# Patient Record
Sex: FEMALE | Race: BLACK OR AFRICAN AMERICAN | NOT HISPANIC OR LATINO | Employment: FULL TIME | URBAN - METROPOLITAN AREA
[De-identification: names, ages, dates, MRNs, and addresses within clinical notes are randomized per-mention and may not be internally consistent; named-entity substitution may affect disease eponyms.]

---
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
## 2020-02-05 ENCOUNTER — TELEPHONE (OUTPATIENT)
Dept: OBSTETRICS AND GYNECOLOGY | Facility: CLINIC | Age: 20
End: 2020-02-05

## 2020-02-11 ENCOUNTER — OFFICE VISIT (OUTPATIENT)
Dept: OBSTETRICS AND GYNECOLOGY | Facility: CLINIC | Age: 20
End: 2020-02-11
Payer: MEDICAID

## 2020-02-11 ENCOUNTER — PATIENT MESSAGE (OUTPATIENT)
Dept: OBSTETRICS AND GYNECOLOGY | Facility: CLINIC | Age: 20
End: 2020-02-11

## 2020-02-11 VITALS
SYSTOLIC BLOOD PRESSURE: 120 MMHG | DIASTOLIC BLOOD PRESSURE: 78 MMHG | BODY MASS INDEX: 20.71 KG/M2 | WEIGHT: 106.06 LBS

## 2020-02-11 DIAGNOSIS — N92.1 MENORRHAGIA WITH IRREGULAR CYCLE: Primary | ICD-10-CM

## 2020-02-11 LAB
B-HCG UR QL: NEGATIVE
CTP QC/QA: YES

## 2020-02-11 PROCEDURE — 99212 OFFICE O/P EST SF 10 MIN: CPT | Mod: PBBFAC,PO | Performed by: OBSTETRICS & GYNECOLOGY

## 2020-02-11 PROCEDURE — 99999 PR PBB SHADOW E&M-EST. PATIENT-LVL II: ICD-10-PCS | Mod: PBBFAC,,, | Performed by: OBSTETRICS & GYNECOLOGY

## 2020-02-11 PROCEDURE — 99213 PR OFFICE/OUTPT VISIT, EST, LEVL III, 20-29 MIN: ICD-10-PCS | Mod: S$PBB,,, | Performed by: OBSTETRICS & GYNECOLOGY

## 2020-02-11 PROCEDURE — 99213 OFFICE O/P EST LOW 20 MIN: CPT | Mod: S$PBB,,, | Performed by: OBSTETRICS & GYNECOLOGY

## 2020-02-11 PROCEDURE — 81025 URINE PREGNANCY TEST: CPT | Mod: PBBFAC,PO | Performed by: OBSTETRICS & GYNECOLOGY

## 2020-02-11 PROCEDURE — 99999 PR PBB SHADOW E&M-EST. PATIENT-LVL II: CPT | Mod: PBBFAC,,, | Performed by: OBSTETRICS & GYNECOLOGY

## 2020-02-11 RX ORDER — NORGESTIMATE AND ETHINYL ESTRADIOL 7DAYSX3 LO
1 KIT ORAL DAILY
Qty: 90 TABLET | Refills: 3 | Status: SHIPPED | OUTPATIENT
Start: 2020-02-11 | End: 2020-11-16

## 2020-02-11 NOTE — PROGRESS NOTES
History & Physical  Gynecology      SUBJECTIVE:     Chief Complaint: Metrorrhagia       History of Present Illness:  Ms. Ang, 18 yo , presents today for follow up on irregular periods and birth control. Previously counseled on all methods of birth control and desired to have a Nexplanon placed. Patient was contacted about Nexplanon placement and patient declined at that time. Today she describes her periods as still heavier and lasting longer than previously. She no longer wants Nexplanon as she said she heard scary stories about them and would just like to try pills.       Review of patient's allergies indicates:  No Known Allergies    Past Medical History:   Diagnosis Date    Migraine headache     Scoliosis      Past Surgical History:   Procedure Laterality Date    BACK SURGERY  2018     OB History        0    Para   0    Term   0       0    AB   0    Living   0       SAB   0    TAB   0    Ectopic   0    Multiple   0    Live Births   0               Family History   Problem Relation Age of Onset    Hypertension Mother     Stroke Mother     Aneurysm Mother     Breast cancer Neg Hx     Colon cancer Neg Hx     Ovarian cancer Neg Hx      Social History     Tobacco Use    Smoking status: Never Smoker    Smokeless tobacco: Never Used   Substance Use Topics    Alcohol use: No     Frequency: Never    Drug use: No       Current Outpatient Medications   Medication Sig    ibuprofen (ADVIL,MOTRIN) 600 MG tablet Take 1 tablet (600 mg total) by mouth every 6 (six) hours as needed.    fluticasone (FLONASE) 50 mcg/actuation nasal spray 1 spray (50 mcg total) by Each Nare route 2 (two) times daily as needed. (Patient not taking: Reported on 2020)    norgestimate-ethinyl estradiol (ORTHO TRI-CYCLEN LO) 0.18/0.215/0.25 mg-25 mcg tablet Take 1 tablet by mouth once daily.    sumatriptan (IMITREX) 50 MG tablet TK 1 T PO AOS OF MIGRAINE. MAY REPEAT AFTER 2 H IF HA RETURNS.  MG IN 24  H     No current facility-administered medications for this visit.          Review of Systems:  Review of Systems   Constitutional: Negative for fever.   Gastrointestinal: Negative for abdominal pain.   Endocrine: Negative for diabetes.   Genitourinary: Positive for vaginal bleeding (irregular and heavy). Negative for dyspareunia and pelvic pain.   Musculoskeletal: Negative for back pain.   Integumentary:  Negative for acne, breast mass, nipple discharge and breast tenderness.   Neurological: Negative for syncope.   Breast: Negative for lump, mass, nipple discharge and tenderness       OBJECTIVE:     Physical Exam:  Physical Exam   Constitutional: She appears well-developed and well-nourished.   HENT:   Head: Normocephalic and atraumatic.   Eyes: EOM are normal.   Neck: Normal range of motion. Neck supple.   Cardiovascular: Normal rate.   Pulmonary/Chest: Effort normal.   Abdominal: Soft.   Genitourinary: Vagina normal.   Musculoskeletal: Normal range of motion.   Neurological: She is alert.   Skin: Skin is warm.   Psychiatric: She has a normal mood and affect.         ASSESSMENT:       ICD-10-CM ICD-9-CM    1. Menorrhagia with irregular cycle N92.1 626.2 POCT Urine Pregnancy      norgestimate-ethinyl estradiol (ORTHO TRI-CYCLEN LO) 0.18/0.215/0.25 mg-25 mcg tablet          Plan:      1. Irregular cycle  - Negative pregnancy test  - OCPs ordered to patient pharmacy  - Patient will contact through portal if no change in cycles over next 2 months or desire for other contraception    Laura Chavez M.D.  [facility] PGY1

## 2020-03-11 ENCOUNTER — PATIENT MESSAGE (OUTPATIENT)
Dept: OBSTETRICS AND GYNECOLOGY | Facility: CLINIC | Age: 20
End: 2020-03-11

## 2020-03-12 ENCOUNTER — PATIENT MESSAGE (OUTPATIENT)
Dept: OBSTETRICS AND GYNECOLOGY | Facility: CLINIC | Age: 20
End: 2020-03-12

## 2020-03-25 ENCOUNTER — HOSPITAL ENCOUNTER (EMERGENCY)
Facility: HOSPITAL | Age: 20
Discharge: HOME | End: 2020-03-25
Attending: EMERGENCY MEDICINE
Payer: MEDICAID

## 2020-03-25 VITALS
OXYGEN SATURATION: 98 % | TEMPERATURE: 98 F | WEIGHT: 107 LBS | SYSTOLIC BLOOD PRESSURE: 126 MMHG | HEART RATE: 88 BPM | BODY MASS INDEX: 21.01 KG/M2 | DIASTOLIC BLOOD PRESSURE: 76 MMHG | HEIGHT: 60 IN | RESPIRATION RATE: 20 BRPM

## 2020-03-25 DIAGNOSIS — R07.9 CHEST PAIN: ICD-10-CM

## 2020-03-25 DIAGNOSIS — R07.9 CHEST PAIN, UNSPECIFIED TYPE: Primary | ICD-10-CM

## 2020-03-25 LAB
B-HCG UR QL: NEGATIVE
BACTERIA #/AREA URNS HPF: NORMAL /HPF
BILIRUB UR QL STRIP: NEGATIVE
CLARITY UR: ABNORMAL
COLOR UR: YELLOW
CTP QC/QA: YES
GLUCOSE UR QL STRIP: NEGATIVE
HGB UR QL STRIP: ABNORMAL
INFLUENZA A, MOLECULAR: NEGATIVE
INFLUENZA B, MOLECULAR: NEGATIVE
KETONES UR QL STRIP: NEGATIVE
LEUKOCYTE ESTERASE UR QL STRIP: NEGATIVE
MICROSCOPIC COMMENT: NORMAL
NITRITE UR QL STRIP: NEGATIVE
PH UR STRIP: 6 [PH] (ref 5–8)
PROT UR QL STRIP: NEGATIVE
RBC #/AREA URNS HPF: 1 /HPF (ref 0–4)
SP GR UR STRIP: 1.02 (ref 1–1.03)
SPECIMEN SOURCE: NORMAL
SQUAMOUS #/AREA URNS HPF: 5 /HPF
URN SPEC COLLECT METH UR: ABNORMAL
UROBILINOGEN UR STRIP-ACNC: 1 EU/DL
WBC #/AREA URNS HPF: 3 /HPF (ref 0–5)

## 2020-03-25 PROCEDURE — 81025 URINE PREGNANCY TEST: CPT | Performed by: EMERGENCY MEDICINE

## 2020-03-25 PROCEDURE — 99284 EMERGENCY DEPT VISIT MOD MDM: CPT | Mod: 25

## 2020-03-25 PROCEDURE — 87502 INFLUENZA DNA AMP PROBE: CPT

## 2020-03-25 PROCEDURE — 93005 ELECTROCARDIOGRAM TRACING: CPT

## 2020-03-25 PROCEDURE — 81000 URINALYSIS NONAUTO W/SCOPE: CPT

## 2020-03-25 PROCEDURE — 25000003 PHARM REV CODE 250: Performed by: EMERGENCY MEDICINE

## 2020-03-25 RX ORDER — ACETAMINOPHEN 325 MG/1
650 TABLET ORAL
Status: COMPLETED | OUTPATIENT
Start: 2020-03-25 | End: 2020-03-25

## 2020-03-25 RX ORDER — KETOROLAC TROMETHAMINE 30 MG/ML
30 INJECTION, SOLUTION INTRAMUSCULAR; INTRAVENOUS
Status: DISCONTINUED | OUTPATIENT
Start: 2020-03-25 | End: 2020-03-25

## 2020-03-25 RX ADMIN — ACETAMINOPHEN 650 MG: 325 TABLET ORAL at 06:03

## 2020-03-25 NOTE — DISCHARGE INSTRUCTIONS
I recommend taking ibuprofen 600mg every 8 hours with food and/or tylenol 650mg every 8 hours with food as needed for pain.

## 2020-03-25 NOTE — ED PROVIDER NOTES
"Encounter Date: 3/25/2020       History     Chief Complaint   Patient presents with    Chest Pain     19y F ambulatory to ED with c/o chest pain and congestion x1 week that "wont go away."     19-year-old female presents to the emergency department complaining of chest pain.  Describes a diffuse aching chest pain for a week.  Pain has been constant.  States she is now having some right upper extremity pain as well, prompting her to come to the department.  Has not taken any medication for the pain.  Denies any shortness of breath.  States pain has been constant without exacerbating or alleviating factors.  Notes some congestion but denies any cough, fever, sore throat.  No other symptoms reported at this time. Of note, patient had a telemedicine visit yesterday for headaches and did not mention her chest pain.         Review of patient's allergies indicates:  No Known Allergies  Past Medical History:   Diagnosis Date    Migraine headache     Scoliosis      Past Surgical History:   Procedure Laterality Date    BACK SURGERY  02/20/2018     Family History   Problem Relation Age of Onset    Hypertension Mother     Stroke Mother     Aneurysm Mother     Breast cancer Neg Hx     Colon cancer Neg Hx     Ovarian cancer Neg Hx      Social History     Tobacco Use    Smoking status: Never Smoker    Smokeless tobacco: Never Used   Substance Use Topics    Alcohol use: No     Frequency: Never    Drug use: No     Review of Systems   Constitutional: Negative for chills and fever.   HENT: Positive for congestion. Negative for sore throat and voice change.    Respiratory: Negative for cough, choking and shortness of breath.    Cardiovascular: Positive for chest pain. Negative for palpitations and leg swelling.   Gastrointestinal: Negative for abdominal pain, diarrhea and vomiting.   Musculoskeletal: Negative for back pain.   Neurological: Negative for light-headedness, numbness and headaches.       Physical Exam "     Initial Vitals [03/25/20 0608]   BP Pulse Resp Temp SpO2   (!) 143/85 78 16 98.2 °F (36.8 °C) 99 %      MAP       --         Physical Exam    Nursing note and vitals reviewed.  Constitutional: She appears well-developed and well-nourished. No distress.   HENT:   Head: Normocephalic and atraumatic.   Eyes: Conjunctivae and EOM are normal. Pupils are equal, round, and reactive to light.   Neck: Normal range of motion. Neck supple. No tracheal deviation present.   Cardiovascular: Normal rate and intact distal pulses.   Pulmonary/Chest: No respiratory distress.   Musculoskeletal: Normal range of motion. She exhibits no edema or tenderness.   Neurological: She is alert and oriented to person, place, and time. She has normal strength. No cranial nerve deficit.   Skin: Skin is warm and dry.         ED Course   Procedures  Labs Reviewed   URINALYSIS - Abnormal; Notable for the following components:       Result Value    Appearance, UA Hazy (*)     Occult Blood UA 3+ (*)     All other components within normal limits   POCT URINE PREGNANCY - Normal   INFLUENZA A & B BY MOLECULAR   URINALYSIS MICROSCOPIC     EKG Readings: (Independently Interpreted)   Initial Reading: No STEMI. Previous EKG Date: 6/3/19 (Minimal change) . Rhythm: Normal Sinus Rhythm. Heart Rate: 70. Ectopy: No Ectopy. Conduction: Normal. ST Segments: Normal ST Segments. T Waves: Normal. Axis: Normal.         X-Rays:   Independently Interpreted Readings:   Other Readings:  CXR Interpreted by radiologist and visualized by me:     Imaging Results          X-Ray Chest AP Portable (Final result)  Result time 03/25/20 07:11:08    Final result by Isiah Morales MD (03/25/20 07:11:08)                 Impression:      No radiographic evidence of acute intrathoracic process.      Electronically signed by: Isiah Morales MD  Date:    03/25/2020  Time:    07:11             Narrative:    EXAMINATION:  XR CHEST AP PORTABLE    CLINICAL HISTORY:  Chest pain,  unspecified    TECHNIQUE:  Single frontal view of the chest was performed.    COMPARISON:  Chest radiograph 12/19/2018    FINDINGS:  Cardiac monitoring leads overlie the chest.  The cardiomediastinal silhouette appears within normal limits.  The lungs are symmetrically expanded.  No evidence of confluent airspace consolidation or pleural effusion.  No evidence of pneumothorax.  There is thoracolumbar spinal fixation hardware present.  Osseous structures appear grossly intact.                                Medical Decision Making:   Initial Assessment:   19-year-old female presents emergency department complaining of chest pain  Differential Diagnosis:   ACS, dissection, pneumonia, pneumothorax, musculoskeletal, GERD  Independently Interpreted Test(s):   I have ordered and independently interpreted X-rays - see prior notes.  I have ordered and independently interpreted EKG Reading(s) - see prior notes  Clinical Tests:   Lab Tests: Reviewed       <> Summary of Lab: UA and UPT negative, influenza negative  ED Management:  Offered patient Toradol.  She declined.  Given Tylenol.  Vital signs remained stable.  Informed her of results as well as plan to discharge with instructions on home management, follow up with primary care physician, reasons to return to the emergency room.                                 Clinical Impression:       ICD-10-CM ICD-9-CM   1. Chest pain, unspecified type R07.9 786.50   2. Chest pain R07.9 786.50         Disposition:   Disposition: Discharged  Condition: Stable                        Lyndon Escobedo MD  03/25/20 0715       Lyndon Escobedo MD  03/25/20 0731

## 2020-03-25 NOTE — ED TRIAGE NOTES
Patient presents to the ED with reports of having chest pain with congestion that started approximately x 1 week ago. Denies any fever, nausea, vomiting, or diarrhea.     Review of patient's allergies indicates:  No Known Allergies     Patient has verified the spelling of their name and  on armband.   APPEARANCE: Patient is alert, calm, oriented x 4, and does not appear distressed.  SKIN: Skin is normal for race, warm, and dry. Normal skin turgor and mucous membranes moist.  CARDIAC: Normal rate and rhythm, no murmur heard. +Chest pain.  RESPIRATORY:Normal rate and effort. Breath sounds clear bilaterally throughout chest. Respirations are equal and unlabored.    GASTRO: Bowel sounds normal, abdomen is soft, no tenderness, and no abdominal distention.  MUSCLE: Full ROM. No bony tenderness or soft tissue tenderness. No obvious deformity.  MENTAL STATUS: awake, alert and aware of environment.  ENT: EARS: no obvious drainage. NOSE: no active bleeding. +Congestion. THROAT: no redness or swelling.

## 2020-03-27 ENCOUNTER — PATIENT MESSAGE (OUTPATIENT)
Dept: OBSTETRICS AND GYNECOLOGY | Facility: CLINIC | Age: 20
End: 2020-03-27

## 2020-04-21 ENCOUNTER — OFFICE VISIT (OUTPATIENT)
Dept: URGENT CARE | Facility: CLINIC | Age: 20
End: 2020-04-21
Payer: MEDICAID

## 2020-04-21 VITALS
RESPIRATION RATE: 16 BRPM | HEART RATE: 95 BPM | TEMPERATURE: 98 F | BODY MASS INDEX: 21.01 KG/M2 | WEIGHT: 107 LBS | HEIGHT: 60 IN | OXYGEN SATURATION: 97 %

## 2020-04-21 DIAGNOSIS — R51.9 ACUTE NONINTRACTABLE HEADACHE, UNSPECIFIED HEADACHE TYPE: Primary | ICD-10-CM

## 2020-04-21 PROCEDURE — 99213 PR OFFICE/OUTPT VISIT, EST, LEVL III, 20-29 MIN: ICD-10-PCS | Mod: S$GLB,,, | Performed by: FAMILY MEDICINE

## 2020-04-21 PROCEDURE — 99213 OFFICE O/P EST LOW 20 MIN: CPT | Mod: S$GLB,,, | Performed by: FAMILY MEDICINE

## 2020-04-21 RX ORDER — KETOROLAC TROMETHAMINE 10 MG/1
TABLET, FILM COATED ORAL
Qty: 20 TABLET | Refills: 0 | Status: SHIPPED | OUTPATIENT
Start: 2020-04-21 | End: 2020-11-16

## 2020-04-21 RX ORDER — KETOROLAC TROMETHAMINE 30 MG/ML
30 INJECTION, SOLUTION INTRAMUSCULAR; INTRAVENOUS
Status: COMPLETED | OUTPATIENT
Start: 2020-04-21 | End: 2020-04-21

## 2020-04-21 RX ADMIN — KETOROLAC TROMETHAMINE 30 MG: 30 INJECTION, SOLUTION INTRAMUSCULAR; INTRAVENOUS at 12:04

## 2020-04-21 NOTE — PATIENT INSTRUCTIONS

## 2020-04-21 NOTE — PROGRESS NOTES
Subjective:       Patient ID: Danette Ang is a 19 y.o. female.    Vitals:  height is 5' (1.524 m) and weight is 48.5 kg (107 lb). Her tympanic temperature is 98.1 °F (36.7 °C). Her pulse is 95. Her respiration is 16 and oxygen saturation is 97%.     Chief Complaint: Headache    19-year-old female with the history of headaches questionable migraines with complaint of having a right-sided occasionally left-sided headache which is been off and on for last 2-3 weeks it is associated with some nausea and photophobia denies any sinus issues denies any vomiting or diarrhea patient was given in the past sumatriptan but she does like it and makes her feel weird she also states that her headache was worse last night was almost 8/10 and today it is 4/10    Headache    This is a recurrent problem. The current episode started in the past 7 days (X3 DAYS). The problem occurs constantly. The problem has been unchanged. The pain is located in the bilateral and frontal region. The pain does not radiate. The pain quality is similar to prior headaches. The quality of the pain is described as aching. The pain is at a severity of 9/10. The pain is moderate. Pertinent negatives include no blurred vision, dizziness, eye pain, fever, loss of balance, nausea, neck pain, photophobia, tinnitus, vomiting or weakness. She has tried acetaminophen (EXCEDRIN MIGRAINE, SUMATRIPTIN) for the symptoms. The treatment provided no relief. Her past medical history is significant for migraine headaches.       Constitution: Negative for chills, sweating and fever.   HENT: Negative for tinnitus, facial swelling, congestion and sinus pain.    Neck: Negative for neck pain and neck stiffness.   Eyes: Negative for eye pain, photophobia, vision loss, double vision and blurred vision.   Gastrointestinal: Negative for nausea and vomiting.   Genitourinary: Negative for missed menses.   Musculoskeletal: Negative for trauma and muscle ache.   Skin: Negative for  rash, wound and lesion.   Neurological: Positive for headaches and history of migraines. Negative for dizziness, history of vertigo, light-headedness, facial drooping, speech difficulty, coordination disturbances, loss of balance, disorientation and loss of consciousness.   Psychiatric/Behavioral: Negative for disorientation, confusion, nervous/anxious, sleep disturbance and depression. The patient is not nervous/anxious.        Objective:      Physical Exam   Constitutional: She is oriented to person, place, and time. She appears well-developed and well-nourished. She is cooperative.  Non-toxic appearance. She does not appear ill. No distress.   HENT:   Head: Normocephalic and atraumatic.   Right Ear: Hearing, tympanic membrane, external ear and ear canal normal.   Left Ear: Hearing, tympanic membrane, external ear and ear canal normal.   Nose: Nose normal. No mucosal edema, rhinorrhea or nasal deformity. No epistaxis. Right sinus exhibits no maxillary sinus tenderness and no frontal sinus tenderness. Left sinus exhibits no maxillary sinus tenderness and no frontal sinus tenderness.   Mouth/Throat: Uvula is midline, oropharynx is clear and moist and mucous membranes are normal. No trismus in the jaw. Normal dentition. No uvula swelling. No oropharyngeal exudate or posterior oropharyngeal erythema.   Eyes: Conjunctivae and lids are normal. Right eye exhibits no discharge. Left eye exhibits no discharge. No scleral icterus.   Neck: Trachea normal, normal range of motion, full passive range of motion without pain and phonation normal. Neck supple. No JVD present. No tracheal deviation present. No thyromegaly present.   Cardiovascular: Normal rate, regular rhythm, normal heart sounds, intact distal pulses and normal pulses. Exam reveals no friction rub.   No murmur heard.  Pulmonary/Chest: Effort normal and breath sounds normal. No respiratory distress.   Abdominal: Soft. Normal appearance and bowel sounds are normal.  She exhibits no distension, no pulsatile midline mass and no mass. There is no tenderness.   Musculoskeletal: Normal range of motion. She exhibits no edema or deformity.   Lymphadenopathy:     She has no cervical adenopathy.   Neurological: She is alert and oriented to person, place, and time. She displays normal reflexes. No cranial nerve deficit or sensory deficit. She exhibits normal muscle tone. Coordination normal.   Skin: Skin is warm, dry, intact, not diaphoretic and not pale.   Psychiatric: She has a normal mood and affect. Her speech is normal and behavior is normal. Judgment and thought content normal. Cognition and memory are normal.   Nursing note and vitals reviewed.        Assessment:       1. Acute nonintractable headache, unspecified headache type        Plan:         Acute nonintractable headache, unspecified headache type    Other orders  -     ketorolac (TORADOL) 10 mg tablet; Take one po q 8 hrs prn pain  Dispense: 20 tablet; Refill: 0          Pt advised of keeping headache diary

## 2020-04-24 ENCOUNTER — TELEPHONE (OUTPATIENT)
Dept: OBSTETRICS AND GYNECOLOGY | Facility: CLINIC | Age: 20
End: 2020-04-24

## 2020-04-24 NOTE — TELEPHONE ENCOUNTER
Spoke with pt to reschedule pt appt with Dr. Gamble for problem/concern visit. Pt explained that she is fine and does not feel she needs to be rescheduled.

## 2020-05-06 ENCOUNTER — PATIENT MESSAGE (OUTPATIENT)
Dept: OBSTETRICS AND GYNECOLOGY | Facility: CLINIC | Age: 20
End: 2020-05-06

## 2020-05-13 ENCOUNTER — PATIENT MESSAGE (OUTPATIENT)
Dept: OBSTETRICS AND GYNECOLOGY | Facility: CLINIC | Age: 20
End: 2020-05-13

## 2020-05-14 ENCOUNTER — OFFICE VISIT (OUTPATIENT)
Dept: OBSTETRICS AND GYNECOLOGY | Facility: CLINIC | Age: 20
End: 2020-05-14
Payer: MEDICAID

## 2020-05-14 VITALS
SYSTOLIC BLOOD PRESSURE: 120 MMHG | BODY MASS INDEX: 20.06 KG/M2 | HEIGHT: 61 IN | TEMPERATURE: 98 F | WEIGHT: 106.25 LBS | DIASTOLIC BLOOD PRESSURE: 80 MMHG

## 2020-05-14 DIAGNOSIS — N76.0 ACUTE VAGINITIS: Primary | ICD-10-CM

## 2020-05-14 PROCEDURE — 99213 PR OFFICE/OUTPT VISIT, EST, LEVL III, 20-29 MIN: ICD-10-PCS | Mod: S$PBB,,, | Performed by: OBSTETRICS & GYNECOLOGY

## 2020-05-14 PROCEDURE — 87481 CANDIDA DNA AMP PROBE: CPT | Mod: 59

## 2020-05-14 PROCEDURE — 99213 OFFICE O/P EST LOW 20 MIN: CPT | Mod: S$PBB,,, | Performed by: OBSTETRICS & GYNECOLOGY

## 2020-05-14 PROCEDURE — 99213 OFFICE O/P EST LOW 20 MIN: CPT | Mod: PBBFAC,PO | Performed by: OBSTETRICS & GYNECOLOGY

## 2020-05-14 PROCEDURE — 99999 PR PBB SHADOW E&M-EST. PATIENT-LVL III: CPT | Mod: PBBFAC,,, | Performed by: OBSTETRICS & GYNECOLOGY

## 2020-05-14 PROCEDURE — 99999 PR PBB SHADOW E&M-EST. PATIENT-LVL III: ICD-10-PCS | Mod: PBBFAC,,, | Performed by: OBSTETRICS & GYNECOLOGY

## 2020-05-14 RX ORDER — FERROUS SULFATE 325(65) MG
325 TABLET ORAL
COMMUNITY
Start: 2020-04-13 | End: 2020-07-12

## 2020-05-14 NOTE — PROGRESS NOTES
SUBJECTIVE:   19 y.o. female  complains of white vaginal discharge for 1 week. Patient's last menstrual period was 2020 (exact date)..  She describes her periods as regular.  She describes other symptoms as itching.    ROS:  GENERAL: No fever, chills, fatigability or weight loss.  VULVAR: No pain, no lesions and no itching.  VAGINAL: No relaxation, no itching, no discharge, no abnormal bleeding and no lesions.  ABDOMEN: No abdominal pain. Denies nausea. Denies vomiting. No diarrhea. No constipation  BREAST: Denies pain. No lumps. No discharge.  URINARY: No incontinence, no nocturia, no frequency and no dysuria.  CARDIOVASCULAR: No chest pain. No shortness of breath. No leg cramps.  NEUROLOGICAL: No headaches. No vision changes.        Vitals:    20 1118   BP: 120/80   Temp: 98.1 °F (36.7 °C)         OBJECTIVE:   She appears well, afebrile.  Abdomen: benign, soft, nontender, no masses.  VULVA: Normal external female genitalia, normal urethra, normal urethral meatus  VAGINA:discharge: white  CERVIXNormal  UTERUSnot examined  ADNEXAnot evaluated      ASSESSMENT:   Danette MUNOZ was seen today for vaginal discharge and vaginal itching.    Diagnoses and all orders for this visit:    Acute vaginitis  -     Vaginosis Screen by DNA Probe

## 2020-05-16 LAB
BACTERIAL VAGINOSIS DNA: POSITIVE
CANDIDA GLABRATA DNA: NEGATIVE
CANDIDA KRUSEI DNA: NEGATIVE
CANDIDA RRNA VAG QL PROBE: POSITIVE
T VAGINALIS RRNA GENITAL QL PROBE: NEGATIVE

## 2020-05-18 ENCOUNTER — PATIENT MESSAGE (OUTPATIENT)
Dept: OBSTETRICS AND GYNECOLOGY | Facility: CLINIC | Age: 20
End: 2020-05-18

## 2020-05-18 ENCOUNTER — PATIENT MESSAGE (OUTPATIENT)
Dept: OBSTETRICS AND GYNECOLOGY | Facility: HOSPITAL | Age: 20
End: 2020-05-18

## 2020-05-18 RX ORDER — METRONIDAZOLE 500 MG/1
500 TABLET ORAL EVERY 12 HOURS
Qty: 14 TABLET | Refills: 0 | Status: SHIPPED | OUTPATIENT
Start: 2020-05-18 | End: 2020-05-25 | Stop reason: SDUPTHER

## 2020-05-18 RX ORDER — FLUCONAZOLE 150 MG/1
150 TABLET ORAL DAILY
Qty: 1 TABLET | Refills: 1 | Status: SHIPPED | OUTPATIENT
Start: 2020-05-18 | End: 2020-05-19

## 2020-05-25 RX ORDER — METRONIDAZOLE 500 MG/1
500 TABLET ORAL EVERY 12 HOURS
Qty: 14 TABLET | Refills: 0 | Status: SHIPPED | OUTPATIENT
Start: 2020-05-25 | End: 2020-06-01

## 2020-11-16 ENCOUNTER — OFFICE VISIT (OUTPATIENT)
Dept: OBSTETRICS AND GYNECOLOGY | Facility: CLINIC | Age: 20
End: 2020-11-16
Payer: MEDICAID

## 2020-11-16 VITALS
SYSTOLIC BLOOD PRESSURE: 118 MMHG | WEIGHT: 97.88 LBS | HEIGHT: 61 IN | BODY MASS INDEX: 18.48 KG/M2 | DIASTOLIC BLOOD PRESSURE: 70 MMHG

## 2020-11-16 DIAGNOSIS — N90.89 VULVAR IRRITATION: Primary | ICD-10-CM

## 2020-11-16 PROCEDURE — 99213 PR OFFICE/OUTPT VISIT, EST, LEVL III, 20-29 MIN: ICD-10-PCS | Mod: S$PBB,,, | Performed by: STUDENT IN AN ORGANIZED HEALTH CARE EDUCATION/TRAINING PROGRAM

## 2020-11-16 PROCEDURE — 99999 PR PBB SHADOW E&M-EST. PATIENT-LVL II: CPT | Mod: PBBFAC,,, | Performed by: STUDENT IN AN ORGANIZED HEALTH CARE EDUCATION/TRAINING PROGRAM

## 2020-11-16 PROCEDURE — 99999 PR PBB SHADOW E&M-EST. PATIENT-LVL II: ICD-10-PCS | Mod: PBBFAC,,, | Performed by: STUDENT IN AN ORGANIZED HEALTH CARE EDUCATION/TRAINING PROGRAM

## 2020-11-16 PROCEDURE — 99213 OFFICE O/P EST LOW 20 MIN: CPT | Mod: S$PBB,,, | Performed by: STUDENT IN AN ORGANIZED HEALTH CARE EDUCATION/TRAINING PROGRAM

## 2020-11-16 PROCEDURE — 99212 OFFICE O/P EST SF 10 MIN: CPT | Mod: PBBFAC,PN | Performed by: STUDENT IN AN ORGANIZED HEALTH CARE EDUCATION/TRAINING PROGRAM

## 2020-11-16 NOTE — PROGRESS NOTES
History & Physical  Gynecology      SUBJECTIVE:     Chief Complaint: Vulvar Itch (Patient complaining of vulvar itching and irritation for about 1 week)       History of Present Illness:  Danette Ang is a 20 y.o.  who presents for vulvar irritation. She reports that last week she had some irritation on the outside of her vulva and thought that she may be developing a yeast infection. She did not have discharge or itching/irritation on the inside. The irritation has since completely resolved and she attributes it to shaving. She has been diagnosed with yeast and BV before and states that this was different. She no longer has any complaints.      Review of patient's allergies indicates:  No Known Allergies    Past Medical History:   Diagnosis Date    Migraine headache     Scoliosis      Past Surgical History:   Procedure Laterality Date    BACK SURGERY  2018     OB History        0    Para   0    Term   0       0    AB   0    Living   0       SAB   0    TAB   0    Ectopic   0    Multiple   0    Live Births   0               Family History   Problem Relation Age of Onset    Hypertension Mother     Stroke Mother     Aneurysm Mother     Breast cancer Neg Hx     Colon cancer Neg Hx     Ovarian cancer Neg Hx      Social History     Tobacco Use    Smoking status: Never Smoker    Smokeless tobacco: Never Used   Substance Use Topics    Alcohol use: No     Frequency: Never    Drug use: No       No current outpatient medications on file.     No current facility-administered medications for this visit.          Review of Systems:  Review of Systems   Constitutional: Negative for chills and fever.   Respiratory: Negative for shortness of breath.    Cardiovascular: Negative for chest pain.   Gastrointestinal: Negative for abdominal pain, nausea and vomiting.   Genitourinary: Negative for dysuria, menstrual problem, pelvic pain, vaginal bleeding, vaginal discharge, vaginal pain and vaginal  odor.   Neurological: Negative for headaches.        OBJECTIVE:     Physical Exam:  Physical Exam  Vitals signs reviewed.   Constitutional:       General: She is not in acute distress.     Appearance: She is well-developed. She is not diaphoretic.   HENT:      Head: Normocephalic and atraumatic.   Eyes:      Conjunctiva/sclera: Conjunctivae normal.   Neck:      Musculoskeletal: Normal range of motion.   Cardiovascular:      Rate and Rhythm: Normal rate.   Pulmonary:      Effort: Pulmonary effort is normal. No respiratory distress.   Abdominal:      Palpations: Abdomen is soft.      Tenderness: There is no abdominal tenderness.   Genitourinary:     Vagina: Normal. No vaginal discharge or bleeding.      Cervix: No cervical motion tenderness.      Uterus: Not enlarged and not tender.       Adnexa:         Right: No mass, tenderness or fullness.          Left: No mass, tenderness or fullness.        Comments: External genitalia: Normal  Urethral: Nontender, no masses  Urethral meatus: Normal  Bladder: Non-tender  Musculoskeletal: Normal range of motion.   Skin:     General: Skin is warm and dry.   Neurological:      Mental Status: She is alert.           ASSESSMENT:       ICD-10-CM ICD-9-CM    1. Vulvar irritation  N90.89 624.8           Plan:      -- Benign pelvic exam. Symptoms have completed resolved..  -- Emphasized hygienic practices.   -- RTC for annual WWE at 21 or sooner, if needed.      Tammy Smiht MD  OBGYN PGY-3

## 2020-11-25 ENCOUNTER — HOSPITAL ENCOUNTER (EMERGENCY)
Facility: HOSPITAL | Age: 20
Discharge: HOME | End: 2020-11-25
Attending: EMERGENCY MEDICINE
Payer: MEDICAID

## 2020-11-25 VITALS
SYSTOLIC BLOOD PRESSURE: 128 MMHG | OXYGEN SATURATION: 100 % | DIASTOLIC BLOOD PRESSURE: 91 MMHG | HEIGHT: 60 IN | TEMPERATURE: 98 F | RESPIRATION RATE: 20 BRPM | WEIGHT: 108 LBS | HEART RATE: 90 BPM | BODY MASS INDEX: 21.2 KG/M2

## 2020-11-25 DIAGNOSIS — R40.20 LOSS OF CONSCIOUSNESS: ICD-10-CM

## 2020-11-25 LAB
ALBUMIN SERPL BCP-MCNC: 4.3 G/DL (ref 3.5–5.2)
ALP SERPL-CCNC: 75 U/L (ref 55–135)
ALT SERPL W/O P-5'-P-CCNC: 8 U/L (ref 10–44)
ANION GAP SERPL CALC-SCNC: 7 MMOL/L (ref 8–16)
AST SERPL-CCNC: 18 U/L (ref 10–40)
B-HCG UR QL: NEGATIVE
BASOPHILS # BLD AUTO: 0.03 K/UL (ref 0–0.2)
BASOPHILS NFR BLD: 0.5 % (ref 0–1.9)
BILIRUB SERPL-MCNC: 0.5 MG/DL (ref 0.1–1)
BILIRUB UR QL STRIP: NEGATIVE
BUN SERPL-MCNC: 13 MG/DL (ref 6–20)
CALCIUM SERPL-MCNC: 9.5 MG/DL (ref 8.7–10.5)
CHLORIDE SERPL-SCNC: 105 MMOL/L (ref 95–110)
CLARITY UR: ABNORMAL
CO2 SERPL-SCNC: 27 MMOL/L (ref 23–29)
COLOR UR: YELLOW
CREAT SERPL-MCNC: 0.8 MG/DL (ref 0.5–1.4)
CTP QC/QA: YES
DIFFERENTIAL METHOD: ABNORMAL
EOSINOPHIL # BLD AUTO: 0.1 K/UL (ref 0–0.5)
EOSINOPHIL NFR BLD: 1 % (ref 0–8)
ERYTHROCYTE [DISTWIDTH] IN BLOOD BY AUTOMATED COUNT: 12.1 % (ref 11.5–14.5)
EST. GFR  (AFRICAN AMERICAN): >60 ML/MIN/1.73 M^2
EST. GFR  (NON AFRICAN AMERICAN): >60 ML/MIN/1.73 M^2
GLUCOSE SERPL-MCNC: 85 MG/DL (ref 70–110)
GLUCOSE UR QL STRIP: NEGATIVE
HCT VFR BLD AUTO: 39.4 % (ref 37–48.5)
HGB BLD-MCNC: 13.2 G/DL (ref 12–16)
HGB UR QL STRIP: NEGATIVE
IMM GRANULOCYTES # BLD AUTO: 0.01 K/UL (ref 0–0.04)
IMM GRANULOCYTES NFR BLD AUTO: 0.2 % (ref 0–0.5)
KETONES UR QL STRIP: NEGATIVE
LEUKOCYTE ESTERASE UR QL STRIP: NEGATIVE
LYMPHOCYTES # BLD AUTO: 1.7 K/UL (ref 1–4.8)
LYMPHOCYTES NFR BLD: 27.7 % (ref 18–48)
MCH RBC QN AUTO: 29.9 PG (ref 27–31)
MCHC RBC AUTO-ENTMCNC: 33.5 G/DL (ref 32–36)
MCV RBC AUTO: 89 FL (ref 82–98)
MONOCYTES # BLD AUTO: 0.4 K/UL (ref 0.3–1)
MONOCYTES NFR BLD: 6.4 % (ref 4–15)
NEUTROPHILS # BLD AUTO: 3.9 K/UL (ref 1.8–7.7)
NEUTROPHILS NFR BLD: 64.2 % (ref 38–73)
NITRITE UR QL STRIP: NEGATIVE
NRBC BLD-RTO: 0 /100 WBC
PH UR STRIP: 7 [PH] (ref 5–8)
PLATELET # BLD AUTO: 134 K/UL (ref 150–350)
PMV BLD AUTO: 12.8 FL (ref 9.2–12.9)
POCT GLUCOSE: 77 MG/DL (ref 70–110)
POTASSIUM SERPL-SCNC: 4.3 MMOL/L (ref 3.5–5.1)
PROT SERPL-MCNC: 7.9 G/DL (ref 6–8.4)
PROT UR QL STRIP: ABNORMAL
RBC # BLD AUTO: 4.41 M/UL (ref 4–5.4)
SODIUM SERPL-SCNC: 139 MMOL/L (ref 136–145)
SP GR UR STRIP: 1.02 (ref 1–1.03)
URN SPEC COLLECT METH UR: ABNORMAL
UROBILINOGEN UR STRIP-ACNC: NEGATIVE EU/DL
WBC # BLD AUTO: 6.1 K/UL (ref 3.9–12.7)

## 2020-11-25 PROCEDURE — 93010 EKG 12-LEAD: ICD-10-PCS | Mod: ,,, | Performed by: INTERNAL MEDICINE

## 2020-11-25 PROCEDURE — 85025 COMPLETE CBC W/AUTO DIFF WBC: CPT

## 2020-11-25 PROCEDURE — 81003 URINALYSIS AUTO W/O SCOPE: CPT

## 2020-11-25 PROCEDURE — 99285 EMERGENCY DEPT VISIT HI MDM: CPT | Mod: 25

## 2020-11-25 PROCEDURE — 82962 GLUCOSE BLOOD TEST: CPT

## 2020-11-25 PROCEDURE — 93005 ELECTROCARDIOGRAM TRACING: CPT

## 2020-11-25 PROCEDURE — 81025 URINE PREGNANCY TEST: CPT | Performed by: EMERGENCY MEDICINE

## 2020-11-25 PROCEDURE — 80053 COMPREHEN METABOLIC PANEL: CPT

## 2020-11-25 PROCEDURE — 93010 ELECTROCARDIOGRAM REPORT: CPT | Mod: ,,, | Performed by: INTERNAL MEDICINE

## 2020-11-25 NOTE — ED PROVIDER NOTES
"Encounter Date: 11/25/2020    SCRIBE #1 NOTE: I, Mckenzie Gilmore, am scribing for, and in the presence of, Dr. Dorado.       History     Chief Complaint   Patient presents with    Loss of Consciousness     pt states she was at work, all of a sudden became dizzy and " pasted out" pt was lowered to floor by co workers, pt denies any symptoms currently, states this has happened before " when sugar was low" pt aaox3      Time seen by provider: 2:14 PM    Pt is a 19 yo AAF who presents with complaint of loss of consciousness Patient was at work talking when she suddenly became dizzy, had blurred vision, palpitations and SOB. Pt states that this episode occurred approximately 1 hour PTA.  She states that she "passed out" and was lowered to the floor by her boss. She denies anything at work startling her or surprising her that could have been the cause of this episode. She denies any head trauma. Patient denies any symptoms currently but notes feeling "drained." Patient reports this has happened before when her sugar was low. Her last normal menstrual period was 11/3. Pt without any current complaints. Pt VSS, NAD, AAOx3 on arrival.     The history is provided by the patient. No  was used.     Review of patient's allergies indicates:  No Known Allergies  Past Medical History:   Diagnosis Date    Migraine headache     Scoliosis      Past Surgical History:   Procedure Laterality Date    BACK SURGERY  02/20/2018     Family History   Problem Relation Age of Onset    Hypertension Mother     Stroke Mother     Aneurysm Mother     Breast cancer Neg Hx     Colon cancer Neg Hx     Ovarian cancer Neg Hx      Social History     Tobacco Use    Smoking status: Never Smoker    Smokeless tobacco: Never Used   Substance Use Topics    Alcohol use: No     Frequency: Never    Drug use: No     Review of Systems   Constitutional: Negative for chills and fever.   HENT: Negative for congestion, rhinorrhea and " sore throat.    Eyes: Positive for visual disturbance. Negative for redness.   Respiratory: Positive for shortness of breath. Negative for cough.    Cardiovascular: Positive for palpitations. Negative for chest pain.   Gastrointestinal: Negative for abdominal pain, diarrhea, nausea and vomiting.   Genitourinary: Negative for dysuria and hematuria.   Musculoskeletal: Negative for back pain.   Skin: Negative for rash.   Neurological: Positive for dizziness and syncope. Negative for weakness and numbness.       Physical Exam     Initial Vitals [11/25/20 1335]   BP Pulse Resp Temp SpO2   (!) 133/92 80 20 98 °F (36.7 °C) 100 %      MAP       --         Physical Exam    Nursing note and vitals reviewed.  Constitutional: She appears well-developed and well-nourished. She is not diaphoretic. No distress.   HENT:   Head: Normocephalic and atraumatic.   Eyes: Conjunctivae and EOM are normal. Pupils are equal, round, and reactive to light.   Neck: Normal range of motion. Neck supple.   Cardiovascular: Normal rate, regular rhythm and normal heart sounds.   Pulmonary/Chest: Breath sounds normal. No respiratory distress.   Abdominal: Soft. There is no abdominal tenderness.   Musculoskeletal: Normal range of motion. No tenderness or edema.      Comments: Well-healed midline scar to back   Neurological: She is alert and oriented to person, place, and time. She has normal strength. GCS score is 15. GCS eye subscore is 4. GCS verbal subscore is 5. GCS motor subscore is 6.   Strength 5/5  Sensation grossly in tact  No focal neurological deficits appreciated on exam  MAEW  GCS 15    Skin: Skin is warm and dry. Capillary refill takes less than 2 seconds.   Psychiatric: She has a normal mood and affect.         ED Course   Procedures  Labs Reviewed   CBC W/ AUTO DIFFERENTIAL - Abnormal; Notable for the following components:       Result Value    Platelets 134 (*)     All other components within normal limits   COMPREHENSIVE METABOLIC  PANEL - Abnormal; Notable for the following components:    ALT 8 (*)     Anion Gap 7 (*)     All other components within normal limits   URINALYSIS - Abnormal; Notable for the following components:    Appearance, UA Hazy (*)     Protein, UA Trace (*)     All other components within normal limits   POCT URINE PREGNANCY   POCT GLUCOSE     EKG Readings: (Independently Interpreted)   Initial Reading: No STEMI. Rhythm: Normal Sinus Rhythm. Heart Rate: 78. ST Segments: Normal ST Segments. T Waves: Normal. Axis: Right Axis Deviation.       Imaging Results          X-Ray Chest PA And Lateral (Final result)  Result time 11/25/20 15:49:43    Final result by Familia Riojas MD (11/25/20 15:49:43)                 Impression:      No acute abnormality.      Electronically signed by: Familia Riojas MD  Date:    11/25/2020  Time:    15:49             Narrative:    EXAMINATION:  XR CHEST PA AND LATERAL    CLINICAL HISTORY:  Unspecified coma    TECHNIQUE:  PA and lateral views of the chest were performed.    COMPARISON:  Prior exams dating back to 2006    FINDINGS:  Lungs are clear.  No effusion or pneumothorax.    Unremarkable cardiomediastinal silhouette.    No acute bone abnormality.  Visible aspects of the spinal hardware project in expected position.                                 Medical Decision Making:   History:   Old Medical Records: I decided to obtain old medical records.  Independently Interpreted Test(s):   I have ordered and independently interpreted EKG Reading(s) - see prior notes  Clinical Tests:   Lab Tests: Ordered and Reviewed  Radiological Study: Ordered and Reviewed  Medical Tests: Ordered and Reviewed  ED Management:  - UPT negative  - CBC notable for plt count of 134; H/H stable   - CMP without significant electrolyte abnormality; renal function WNL   - CXR without acute cardiopulmonary process per my interpretation, final radiology read  - pt observed for short period of time in emergency department  without untoward event  - No further intervention is indicated at this time after having taken into account the patient's history, physical exam findings, and empirical and objective data obtained during the patient's emergency department workup.   - The patient is at low risk for an emergent medical condition at this time, and I am of the belief that that it is safe to discharge the patient from the emergency department.   - The patient is instructed to follow up as outpatient as indicated on the discharge paperwork.    - I have discussed the specifics of the workup with the patient and the patient has verbalized understanding of the details of the workup, the diagnosis, the treatment plan, and the need for outpatient follow-up.    - Although the patient has no emergent etiology today this does not preclude the development of an emergent condition so, in addition, I have advised the patient that they can return to the ED and/or activate EMS at any time with worsening of their symptoms, change of their symptoms, or with any other medical complaint.    - The patient remained comfortable and stable during their visit in the ED.    - Discharge and follow-up instructions discussed with the patient who expressed understanding and willingness to comply with my recommendations.  - Results of all emergency department tests  discussed thoroughly with patient; all patient questions answered; pt in agreement with plan  - Pt instructed to follow up with PCP in 2-3 days for recheck of today's complaints  - Pt given strict emergency department return precautions for any new or worsening of symptoms  - Pt discharged from the emergency department in stable condition, in no acute distress                               Clinical Impression:     ICD-10-CM ICD-9-CM   1. Loss of consciousness  R40.20 780.09                          ED Disposition Condition    Discharge Stable        ED Prescriptions     None        Follow-up Information     None                         I, Rick Dorado,  personally performed the services described in this documentation. All medical record entries made by the scribe were at my direction and in my presence.  I have reviewed the chart and agree that the record reflects my personal performance and is accurate and complete. Rick Dorado M.D. 6:49 PM11/25/2020             Rick Dorado MD  11/25/20 1849

## 2020-11-25 NOTE — Clinical Note
"Danette Matuterosalina" Page was seen and treated in our emergency department on 11/25/2020.  She may return to work on 11/26/2020.       If you have any questions or concerns, please don't hesitate to call.      Rose Mary DALEY    "

## 2020-11-25 NOTE — DISCHARGE INSTRUCTIONS
Please follow up with your primary care physician in the next 2-3 days for recheck of today's complaints.

## 2020-11-25 NOTE — ED NOTES
"Pt presents to ed from work with c/o syncopal episode while working. Pt states she was standing up and talking to her boss, when pt became weak and passed out. Pt states her boss "caught me" so patient denies falling or hitting head. Patient c/o weakness. Denies dizziness. Patient denies pain. Denies fever or chills. Denies trouble urinating. Patient has no other complaints. Cardiac mon in place. Vs stable. Will continue to monitor.  "

## 2020-12-03 ENCOUNTER — PATIENT MESSAGE (OUTPATIENT)
Dept: OBSTETRICS AND GYNECOLOGY | Facility: CLINIC | Age: 20
End: 2020-12-03

## 2020-12-04 ENCOUNTER — PATIENT MESSAGE (OUTPATIENT)
Dept: OBSTETRICS AND GYNECOLOGY | Facility: CLINIC | Age: 20
End: 2020-12-04

## 2020-12-06 ENCOUNTER — HOSPITAL ENCOUNTER (EMERGENCY)
Facility: HOSPITAL | Age: 20
Discharge: HOME | End: 2020-12-06
Attending: EMERGENCY MEDICINE
Payer: MEDICAID

## 2020-12-06 VITALS
OXYGEN SATURATION: 100 % | BODY MASS INDEX: 19.24 KG/M2 | HEART RATE: 85 BPM | DIASTOLIC BLOOD PRESSURE: 90 MMHG | SYSTOLIC BLOOD PRESSURE: 130 MMHG | WEIGHT: 98 LBS | RESPIRATION RATE: 15 BRPM | TEMPERATURE: 98 F | HEIGHT: 60 IN

## 2020-12-06 DIAGNOSIS — R10.13 EPIGASTRIC ABDOMINAL PAIN: Primary | ICD-10-CM

## 2020-12-06 DIAGNOSIS — K59.00 CONSTIPATION, UNSPECIFIED CONSTIPATION TYPE: ICD-10-CM

## 2020-12-06 LAB
ALBUMIN SERPL BCP-MCNC: 4.6 G/DL (ref 3.5–5.2)
ALP SERPL-CCNC: 81 U/L (ref 55–135)
ALT SERPL W/O P-5'-P-CCNC: 7 U/L (ref 10–44)
ANION GAP SERPL CALC-SCNC: 12 MMOL/L (ref 8–16)
AST SERPL-CCNC: 18 U/L (ref 10–40)
B-HCG UR QL: NEGATIVE
BASOPHILS # BLD AUTO: 0.01 K/UL (ref 0–0.2)
BASOPHILS NFR BLD: 0.2 % (ref 0–1.9)
BILIRUB SERPL-MCNC: 1 MG/DL (ref 0.1–1)
BILIRUB UR QL STRIP: NEGATIVE
BUN SERPL-MCNC: 8 MG/DL (ref 6–20)
CALCIUM SERPL-MCNC: 9.6 MG/DL (ref 8.7–10.5)
CHLORIDE SERPL-SCNC: 105 MMOL/L (ref 95–110)
CLARITY UR: CLEAR
CO2 SERPL-SCNC: 23 MMOL/L (ref 23–29)
COLOR UR: YELLOW
CREAT SERPL-MCNC: 0.8 MG/DL (ref 0.5–1.4)
CTP QC/QA: YES
DIFFERENTIAL METHOD: ABNORMAL
EOSINOPHIL # BLD AUTO: 0 K/UL (ref 0–0.5)
EOSINOPHIL NFR BLD: 0.8 % (ref 0–8)
ERYTHROCYTE [DISTWIDTH] IN BLOOD BY AUTOMATED COUNT: 12.2 % (ref 11.5–14.5)
EST. GFR  (AFRICAN AMERICAN): >60 ML/MIN/1.73 M^2
EST. GFR  (NON AFRICAN AMERICAN): >60 ML/MIN/1.73 M^2
GLUCOSE SERPL-MCNC: 82 MG/DL (ref 70–110)
GLUCOSE UR QL STRIP: NEGATIVE
HCG INTACT+B SERPL-ACNC: <1.2 MIU/ML
HCT VFR BLD AUTO: 43.1 % (ref 37–48.5)
HGB BLD-MCNC: 14.4 G/DL (ref 12–16)
HGB UR QL STRIP: NEGATIVE
IMM GRANULOCYTES # BLD AUTO: 0.01 K/UL (ref 0–0.04)
IMM GRANULOCYTES NFR BLD AUTO: 0.2 % (ref 0–0.5)
KETONES UR QL STRIP: NEGATIVE
LEUKOCYTE ESTERASE UR QL STRIP: NEGATIVE
LYMPHOCYTES # BLD AUTO: 2.1 K/UL (ref 1–4.8)
LYMPHOCYTES NFR BLD: 43.4 % (ref 18–48)
MCH RBC QN AUTO: 29.6 PG (ref 27–31)
MCHC RBC AUTO-ENTMCNC: 33.4 G/DL (ref 32–36)
MCV RBC AUTO: 89 FL (ref 82–98)
MONOCYTES # BLD AUTO: 0.2 K/UL (ref 0.3–1)
MONOCYTES NFR BLD: 5 % (ref 4–15)
NEUTROPHILS # BLD AUTO: 2.4 K/UL (ref 1.8–7.7)
NEUTROPHILS NFR BLD: 50.4 % (ref 38–73)
NITRITE UR QL STRIP: NEGATIVE
NRBC BLD-RTO: 0 /100 WBC
PH UR STRIP: 7 [PH] (ref 5–8)
PLATELET # BLD AUTO: 176 K/UL (ref 150–350)
PMV BLD AUTO: 12.2 FL (ref 9.2–12.9)
POTASSIUM SERPL-SCNC: 3.6 MMOL/L (ref 3.5–5.1)
PROT SERPL-MCNC: 8.6 G/DL (ref 6–8.4)
PROT UR QL STRIP: ABNORMAL
RBC # BLD AUTO: 4.86 M/UL (ref 4–5.4)
SODIUM SERPL-SCNC: 140 MMOL/L (ref 136–145)
SP GR UR STRIP: 1.01 (ref 1–1.03)
URN SPEC COLLECT METH UR: ABNORMAL
UROBILINOGEN UR STRIP-ACNC: NEGATIVE EU/DL
WBC # BLD AUTO: 4.79 K/UL (ref 3.9–12.7)

## 2020-12-06 PROCEDURE — 81003 URINALYSIS AUTO W/O SCOPE: CPT

## 2020-12-06 PROCEDURE — 81025 URINE PREGNANCY TEST: CPT | Performed by: EMERGENCY MEDICINE

## 2020-12-06 PROCEDURE — 85025 COMPLETE CBC W/AUTO DIFF WBC: CPT

## 2020-12-06 PROCEDURE — 84702 CHORIONIC GONADOTROPIN TEST: CPT

## 2020-12-06 PROCEDURE — 80053 COMPREHEN METABOLIC PANEL: CPT

## 2020-12-06 PROCEDURE — 99284 EMERGENCY DEPT VISIT MOD MDM: CPT | Mod: 25

## 2020-12-06 RX ORDER — DICYCLOMINE HYDROCHLORIDE 20 MG/1
20 TABLET ORAL 2 TIMES DAILY PRN
Qty: 20 TABLET | Refills: 0 | Status: SHIPPED | OUTPATIENT
Start: 2020-12-06 | End: 2021-01-05

## 2020-12-06 NOTE — ED PROVIDER NOTES
Encounter Date: 12/6/2020    SCRIBE #1 NOTE: I, Michelle Ahumada, am scribing for, and in the presence of,  Dr. Dorado. I have scribed the entire note.       History     Chief Complaint   Patient presents with    Abdominal Pain     intermitent generalized abd pain x 2 weeks, described as cramps, last ate- this am, denies n/v, last bm-this am, denies vaginal bleeding last menstral cycle 10/29      Time seen by provider: 1:23 PM    This is a 20 y.o. female with a history of fibroids who presents to the ED with complaint of intermittent abdominal cramping for 2 weeks. The patient reports she has been having intermittent cramping pain that occurs twice a day every day and lasts a few minutes. She says it feels like she is going to start her cycle but has not had any bleeding. Patient's LMP was 10/29 and has been having normal, regular BM. Reports the cramping improves when using heating pads. Denies fever, chills, nausea, vomiting, urinary changes, appetite changes, or any other complaints at this time. Patient also mentions she would like a copy of the blood work done in the ED to show to her PCP at [facility].  Patient states that she has not had a period since October 29th and that she is currently 10 days late for when she would normally get her menstrual cycle.    The history is provided by the patient.     Review of patient's allergies indicates:  No Known Allergies  Past Medical History:   Diagnosis Date    Migraine headache     Scoliosis      Past Surgical History:   Procedure Laterality Date    BACK SURGERY  02/20/2018     Family History   Problem Relation Age of Onset    Hypertension Mother     Stroke Mother     Aneurysm Mother     Breast cancer Neg Hx     Colon cancer Neg Hx     Ovarian cancer Neg Hx      Social History     Tobacco Use    Smoking status: Never Smoker    Smokeless tobacco: Never Used   Substance Use Topics    Alcohol use: No     Frequency: Never    Drug use: No      Review of Systems   Constitutional: Negative for chills and fever.   HENT: Negative for congestion, rhinorrhea and sore throat.    Eyes: Negative for redness and visual disturbance.   Respiratory: Negative for cough, shortness of breath and wheezing.    Cardiovascular: Negative for chest pain and palpitations.   Gastrointestinal: Positive for abdominal pain. Negative for diarrhea, nausea and vomiting.   Genitourinary: Positive for menstrual problem. Negative for decreased urine volume, dysuria, hematuria, pelvic pain, urgency, vaginal bleeding, vaginal discharge and vaginal pain.   Musculoskeletal: Negative for back pain, myalgias and neck pain.   Skin: Negative for rash.   Neurological: Negative for dizziness, weakness and light-headedness.   Psychiatric/Behavioral: Negative for confusion.   All other systems reviewed and are negative.      Physical Exam     Initial Vitals [12/06/20 1222]   BP Pulse Resp Temp SpO2   (!) 152/90 88 17 97.5 °F (36.4 °C) 100 %      MAP       --         Physical Exam    Nursing note and vitals reviewed.  Constitutional: She appears well-developed and well-nourished. No distress.   HENT:   Head: Normocephalic and atraumatic.   Mouth/Throat: Oropharynx is clear and moist.   Eyes: Conjunctivae and EOM are normal. Pupils are equal, round, and reactive to light. No scleral icterus.   Neck: Normal range of motion. Neck supple. No stridor present. No tracheal deviation present.   Cardiovascular: Normal rate, regular rhythm and normal heart sounds.   No murmur heard.  Pulmonary/Chest: Breath sounds normal. No respiratory distress.   Abdominal: Soft. Bowel sounds are normal. There is no abdominal tenderness. There is no rebound and no guarding.   Abdomen is soft, nontender, nondistended; normal bowel sounds all 4 quadrants; no rebound no guarding no peritoneal signs appreciated   Musculoskeletal: Normal range of motion. No tenderness or edema.   Neurological: She is alert and oriented to  person, place, and time. No sensory deficit.   Skin: Skin is warm and dry. Capillary refill takes less than 2 seconds.   Psychiatric: She has a normal mood and affect. Her behavior is normal.         ED Course   Procedures  Labs Reviewed   URINALYSIS, REFLEX TO URINE CULTURE - Abnormal; Notable for the following components:       Result Value    Protein, UA Trace (*)     All other components within normal limits    Narrative:     Specimen Source->Urine   CBC W/ AUTO DIFFERENTIAL - Abnormal; Notable for the following components:    Mono # 0.2 (*)     All other components within normal limits   COMPREHENSIVE METABOLIC PANEL - Abnormal; Notable for the following components:    Total Protein 8.6 (*)     ALT 7 (*)     All other components within normal limits   HCG, QUANTITATIVE, PREGNANCY   HCG, QUANTITATIVE, PREGNANCY   POCT URINE PREGNANCY          X-Rays:   Independently Interpreted Readings:   Other Readings:  Reviewed by myself, read by radiology.    Imaging Results          X-Ray Abdomen Portable (Final result)  Result time 12/06/20 13:50:36    Final result by David Corral MD (12/06/20 13:50:36)                 Impression:      As above      Electronically signed by: David Corral MD  Date:    12/06/2020  Time:    13:50             Narrative:    EXAMINATION:  XR ABDOMEN PORTABLE    CLINICAL HISTORY:  Unspecified abdominal pain    TECHNIQUE:  Two views of the abdomen    COMPARISON:  07/15/2006    FINDINGS:  Thoracolumbar spinal hardware is again noted.  Moderate retained fecal matter in the colon.  Bowel gas pattern is nonobstructed.  Lung bases are clear.  The bones are intact.                              Medical Decision Making:   Clinical Tests:   Lab Tests: Ordered and Reviewed  Radiological Study: Ordered and Reviewed  ED Management:  - AXR demonstrates moderate retained fecal matter in the colon.  Bowel gas pattern is nonobstructed; will recommend bowel regimen   - labs unremarkable  - UA without findings  suggestive of infectious pattern   - beta hCG WNL   - in light of pt's hx of fibroids, and irregular menstrual cycle pattern, I will recommend follow up of gynecologist as OP   - will give pt rx for Bentyl prn abdominal cramping  - No further intervention is indicated at this time after having taken into account the patient's history, physical exam findings, and empirical and objective data obtained during the patient's emergency department workup.   - The patient is at low risk for an emergent medical condition at this time, and I am of the belief that that it is safe to discharge the patient from the emergency department.   - The patient is instructed to follow up as outpatient as indicated on the discharge paperwork.    - I have discussed the specifics of the workup with the patient and the patient has verbalized understanding of the details of the workup, the diagnosis, the treatment plan, and the need for outpatient follow-up.    - Although the patient has no emergent etiology today this does not preclude the development of an emergent condition so, in addition, I have advised the patient that they can return to the ED and/or activate EMS at any time with worsening of their symptoms, change of their symptoms, or with any other medical complaint.    - The patient remained comfortable and stable during their visit in the ED.    - Discharge and follow-up instructions discussed with the patient who expressed understanding and willingness to comply with my recommendations.  - Results of all emergency department tests  discussed thoroughly with patient; all patient questions answered; pt in agreement with plan  - Pt instructed to follow up with PCP in 2-3 days for recheck of today's complaints  - Pt given strict emergency department return precautions for any new or worsening of symptoms  - Pt discharged from the emergency department in stable condition, in no acute distress                               Clinical  Impression:     1. Epigastric abdominal pain    2. Constipation, unspecified constipation type            ED Disposition Condition    Discharge Stable        ED Prescriptions     Medication Sig Dispense Start Date End Date Auth. Provider    dicyclomine (BENTYL) 20 mg tablet Take 1 tablet (20 mg total) by mouth 2 (two) times daily as needed (abdominal cramping). 20 tablet 12/6/2020 1/5/2021 Rick Dorado MD        Follow-up Information     Follow up With Specialties Details Why Contact Info    [facility]  If symptoms worsen [address] [facility] [id number] [phone]                      I, Rick Dorado,  personally performed the services described in this documentation. All medical record entries made by the scribe were at my direction and in my presence.  I have reviewed the chart and agree that the record reflects my personal performance and is accurate and complete. Rick Dorado M.D. 3:54 PM12/06/2020               Rick Dorado MD  12/06/20 [id number]

## 2020-12-18 ENCOUNTER — PATIENT MESSAGE (OUTPATIENT)
Dept: OBSTETRICS AND GYNECOLOGY | Facility: CLINIC | Age: 20
End: 2020-12-18

## 2020-12-18 RX ORDER — NORGESTIMATE AND ETHINYL ESTRADIOL 7DAYSX3 LO
1 KIT ORAL DAILY
Qty: 28 TABLET | Refills: 12 | Status: SHIPPED | OUTPATIENT
Start: 2020-12-18 | End: 2021-03-25

## 2021-03-11 ENCOUNTER — PATIENT MESSAGE (OUTPATIENT)
Dept: OBSTETRICS AND GYNECOLOGY | Facility: CLINIC | Age: 21
End: 2021-03-11

## 2021-03-25 ENCOUNTER — OFFICE VISIT (OUTPATIENT)
Dept: OBSTETRICS AND GYNECOLOGY | Facility: CLINIC | Age: 21
End: 2021-03-25
Payer: MEDICAID

## 2021-03-25 VITALS
SYSTOLIC BLOOD PRESSURE: 110 MMHG | BODY MASS INDEX: 20.94 KG/M2 | HEIGHT: 60 IN | WEIGHT: 106.69 LBS | DIASTOLIC BLOOD PRESSURE: 80 MMHG

## 2021-03-25 DIAGNOSIS — R10.2 PELVIC PAIN IN FEMALE: Primary | ICD-10-CM

## 2021-03-25 DIAGNOSIS — Z30.09 ENCOUNTER FOR COUNSELING REGARDING CONTRACEPTION: ICD-10-CM

## 2021-03-25 LAB
B-HCG UR QL: NEGATIVE
CTP QC/QA: YES

## 2021-03-25 PROCEDURE — 81025 URINE PREGNANCY TEST: CPT | Mod: PBBFAC,PN | Performed by: STUDENT IN AN ORGANIZED HEALTH CARE EDUCATION/TRAINING PROGRAM

## 2021-03-25 PROCEDURE — 99213 PR OFFICE/OUTPT VISIT, EST, LEVL III, 20-29 MIN: ICD-10-PCS | Mod: S$PBB,,, | Performed by: STUDENT IN AN ORGANIZED HEALTH CARE EDUCATION/TRAINING PROGRAM

## 2021-03-25 PROCEDURE — 99213 OFFICE O/P EST LOW 20 MIN: CPT | Mod: S$PBB,,, | Performed by: STUDENT IN AN ORGANIZED HEALTH CARE EDUCATION/TRAINING PROGRAM

## 2021-03-25 PROCEDURE — 99999 PR PBB SHADOW E&M-EST. PATIENT-LVL III: ICD-10-PCS | Mod: PBBFAC,,, | Performed by: STUDENT IN AN ORGANIZED HEALTH CARE EDUCATION/TRAINING PROGRAM

## 2021-03-25 PROCEDURE — 99213 OFFICE O/P EST LOW 20 MIN: CPT | Mod: PBBFAC,PN | Performed by: STUDENT IN AN ORGANIZED HEALTH CARE EDUCATION/TRAINING PROGRAM

## 2021-03-25 PROCEDURE — 99999 PR PBB SHADOW E&M-EST. PATIENT-LVL III: CPT | Mod: PBBFAC,,, | Performed by: STUDENT IN AN ORGANIZED HEALTH CARE EDUCATION/TRAINING PROGRAM

## 2021-03-25 RX ORDER — IBUPROFEN 600 MG/1
600 TABLET ORAL EVERY 6 HOURS PRN
COMMUNITY
Start: 2021-01-31 | End: 2021-03-25 | Stop reason: SDUPTHER

## 2021-03-25 RX ORDER — NORETHINDRONE ACETATE AND ETHINYL ESTRADIOL .03; 1.5 MG/1; MG/1
1 TABLET ORAL DAILY
Qty: 28 EACH | Refills: 12 | Status: SHIPPED | OUTPATIENT
Start: 2021-03-25 | End: 2021-06-11

## 2021-03-25 RX ORDER — IBUPROFEN 600 MG/1
600 TABLET ORAL EVERY 6 HOURS PRN
Qty: 30 TABLET | Refills: 1 | Status: SHIPPED | OUTPATIENT
Start: 2021-03-25 | End: 2022-02-11 | Stop reason: ALTCHOICE

## 2021-04-16 ENCOUNTER — PATIENT MESSAGE (OUTPATIENT)
Dept: RESEARCH | Facility: HOSPITAL | Age: 21
End: 2021-04-16

## 2021-04-29 ENCOUNTER — HOSPITAL ENCOUNTER (OUTPATIENT)
Dept: RADIOLOGY | Facility: HOSPITAL | Age: 21
Discharge: HOME | End: 2021-04-29
Attending: STUDENT IN AN ORGANIZED HEALTH CARE EDUCATION/TRAINING PROGRAM
Payer: MEDICAID

## 2021-04-29 DIAGNOSIS — R10.2 PELVIC PAIN IN FEMALE: ICD-10-CM

## 2021-04-29 PROCEDURE — 76857 US PELVIS LIMITED NON OB: ICD-10-PCS | Mod: 26,,, | Performed by: RADIOLOGY

## 2021-04-29 PROCEDURE — 76857 US EXAM PELVIC LIMITED: CPT | Mod: TC

## 2021-04-29 PROCEDURE — 76857 US EXAM PELVIC LIMITED: CPT | Mod: 26,,, | Performed by: RADIOLOGY

## 2021-05-16 ENCOUNTER — OFFICE VISIT (OUTPATIENT)
Dept: URGENT CARE | Facility: CLINIC | Age: 21
End: 2021-05-16
Payer: MEDICAID

## 2021-05-16 VITALS
OXYGEN SATURATION: 97 % | DIASTOLIC BLOOD PRESSURE: 88 MMHG | RESPIRATION RATE: 18 BRPM | BODY MASS INDEX: 21.01 KG/M2 | HEIGHT: 60 IN | HEART RATE: 87 BPM | WEIGHT: 107 LBS | TEMPERATURE: 98 F | SYSTOLIC BLOOD PRESSURE: 125 MMHG

## 2021-05-16 DIAGNOSIS — J02.9 ACUTE VIRAL PHARYNGITIS: Primary | ICD-10-CM

## 2021-05-16 DIAGNOSIS — J02.9 SORE THROAT: ICD-10-CM

## 2021-05-16 LAB
CTP QC/QA: YES
CTP QC/QA: YES
MOLECULAR STREP A: NEGATIVE
SARS-COV-2 RDRP RESP QL NAA+PROBE: NEGATIVE

## 2021-05-16 PROCEDURE — 99213 PR OFFICE/OUTPT VISIT, EST, LEVL III, 20-29 MIN: ICD-10-PCS | Mod: S$GLB,,, | Performed by: NURSE PRACTITIONER

## 2021-05-16 PROCEDURE — U0002: ICD-10-PCS | Mod: QW,S$GLB,, | Performed by: NURSE PRACTITIONER

## 2021-05-16 PROCEDURE — 99213 OFFICE O/P EST LOW 20 MIN: CPT | Mod: S$GLB,,, | Performed by: NURSE PRACTITIONER

## 2021-05-16 PROCEDURE — U0002 COVID-19 LAB TEST NON-CDC: HCPCS | Mod: QW,S$GLB,, | Performed by: NURSE PRACTITIONER

## 2021-05-16 PROCEDURE — 87651 POCT STREP A MOLECULAR: ICD-10-PCS | Mod: QW,S$GLB,, | Performed by: NURSE PRACTITIONER

## 2021-05-16 PROCEDURE — 87651 STREP A DNA AMP PROBE: CPT | Mod: QW,S$GLB,, | Performed by: NURSE PRACTITIONER

## 2021-06-11 ENCOUNTER — OFFICE VISIT (OUTPATIENT)
Dept: OBSTETRICS AND GYNECOLOGY | Facility: CLINIC | Age: 21
End: 2021-06-11
Payer: MEDICAID

## 2021-06-11 VITALS
BODY MASS INDEX: 21.65 KG/M2 | WEIGHT: 110.25 LBS | DIASTOLIC BLOOD PRESSURE: 64 MMHG | HEIGHT: 60 IN | SYSTOLIC BLOOD PRESSURE: 124 MMHG

## 2021-06-11 DIAGNOSIS — N89.8 VAGINAL ODOR: Primary | ICD-10-CM

## 2021-06-11 PROCEDURE — 87481 CANDIDA DNA AMP PROBE: CPT | Mod: 59 | Performed by: STUDENT IN AN ORGANIZED HEALTH CARE EDUCATION/TRAINING PROGRAM

## 2021-06-11 PROCEDURE — 99213 OFFICE O/P EST LOW 20 MIN: CPT | Mod: S$PBB,,, | Performed by: STUDENT IN AN ORGANIZED HEALTH CARE EDUCATION/TRAINING PROGRAM

## 2021-06-11 PROCEDURE — 99999 PR PBB SHADOW E&M-EST. PATIENT-LVL III: CPT | Mod: PBBFAC,,, | Performed by: STUDENT IN AN ORGANIZED HEALTH CARE EDUCATION/TRAINING PROGRAM

## 2021-06-11 PROCEDURE — 99999 PR PBB SHADOW E&M-EST. PATIENT-LVL III: ICD-10-PCS | Mod: PBBFAC,,, | Performed by: STUDENT IN AN ORGANIZED HEALTH CARE EDUCATION/TRAINING PROGRAM

## 2021-06-11 PROCEDURE — 99213 PR OFFICE/OUTPT VISIT, EST, LEVL III, 20-29 MIN: ICD-10-PCS | Mod: S$PBB,,, | Performed by: STUDENT IN AN ORGANIZED HEALTH CARE EDUCATION/TRAINING PROGRAM

## 2021-06-11 PROCEDURE — 99213 OFFICE O/P EST LOW 20 MIN: CPT | Mod: PBBFAC,PN | Performed by: STUDENT IN AN ORGANIZED HEALTH CARE EDUCATION/TRAINING PROGRAM

## 2021-06-13 LAB
BACTERIAL VAGINOSIS DNA: NEGATIVE
CANDIDA GLABRATA DNA: NEGATIVE
CANDIDA KRUSEI DNA: NEGATIVE
CANDIDA RRNA VAG QL PROBE: NEGATIVE
T VAGINALIS RRNA GENITAL QL PROBE: NEGATIVE

## 2021-08-02 ENCOUNTER — OFFICE VISIT (OUTPATIENT)
Dept: OBSTETRICS AND GYNECOLOGY | Facility: CLINIC | Age: 21
End: 2021-08-02
Payer: MEDICAID

## 2021-08-02 VITALS
DIASTOLIC BLOOD PRESSURE: 70 MMHG | BODY MASS INDEX: 21.17 KG/M2 | SYSTOLIC BLOOD PRESSURE: 118 MMHG | HEIGHT: 60 IN | WEIGHT: 107.81 LBS

## 2021-08-02 DIAGNOSIS — Z01.419 WELL WOMAN EXAM: Primary | ICD-10-CM

## 2021-08-02 DIAGNOSIS — Z11.3 ROUTINE SCREENING FOR STI (SEXUALLY TRANSMITTED INFECTION): ICD-10-CM

## 2021-08-02 PROCEDURE — 87491 CHLMYD TRACH DNA AMP PROBE: CPT | Performed by: OBSTETRICS & GYNECOLOGY

## 2021-08-02 PROCEDURE — 99395 PREV VISIT EST AGE 18-39: CPT | Mod: S$PBB,,, | Performed by: OBSTETRICS & GYNECOLOGY

## 2021-08-02 PROCEDURE — 88175 CYTOPATH C/V AUTO FLUID REDO: CPT | Performed by: OBSTETRICS & GYNECOLOGY

## 2021-08-02 PROCEDURE — 99395 PR PREVENTIVE VISIT,EST,18-39: ICD-10-PCS | Mod: S$PBB,,, | Performed by: OBSTETRICS & GYNECOLOGY

## 2021-08-02 PROCEDURE — 99999 PR PBB SHADOW E&M-EST. PATIENT-LVL III: CPT | Mod: PBBFAC,,, | Performed by: OBSTETRICS & GYNECOLOGY

## 2021-08-02 PROCEDURE — 87591 N.GONORRHOEAE DNA AMP PROB: CPT | Performed by: OBSTETRICS & GYNECOLOGY

## 2021-08-02 PROCEDURE — 87481 CANDIDA DNA AMP PROBE: CPT | Mod: 59 | Performed by: OBSTETRICS & GYNECOLOGY

## 2021-08-02 PROCEDURE — 99213 OFFICE O/P EST LOW 20 MIN: CPT | Mod: PBBFAC,PN | Performed by: OBSTETRICS & GYNECOLOGY

## 2021-08-02 PROCEDURE — 99999 PR PBB SHADOW E&M-EST. PATIENT-LVL III: ICD-10-PCS | Mod: PBBFAC,,, | Performed by: OBSTETRICS & GYNECOLOGY

## 2021-08-05 ENCOUNTER — PATIENT MESSAGE (OUTPATIENT)
Dept: OBSTETRICS AND GYNECOLOGY | Facility: CLINIC | Age: 21
End: 2021-08-05

## 2021-08-05 DIAGNOSIS — A74.9 CHLAMYDIA INFECTION: Primary | ICD-10-CM

## 2021-08-05 LAB
C TRACH DNA SPEC QL NAA+PROBE: DETECTED
N GONORRHOEA DNA SPEC QL NAA+PROBE: NOT DETECTED

## 2021-08-05 RX ORDER — AZITHROMYCIN 500 MG/1
1000 TABLET, FILM COATED ORAL ONCE
Qty: 2 TABLET | Refills: 0 | Status: SHIPPED | OUTPATIENT
Start: 2021-08-05 | End: 2021-08-05

## 2021-08-06 ENCOUNTER — TELEPHONE (OUTPATIENT)
Dept: OBSTETRICS AND GYNECOLOGY | Facility: CLINIC | Age: 21
End: 2021-08-06

## 2021-08-09 LAB
FINAL PATHOLOGIC DIAGNOSIS: NORMAL
Lab: NORMAL

## 2021-08-18 ENCOUNTER — HOSPITAL ENCOUNTER (EMERGENCY)
Facility: HOSPITAL | Age: 21
Discharge: HOME | End: 2021-08-19
Attending: EMERGENCY MEDICINE
Payer: MEDICAID

## 2021-08-18 DIAGNOSIS — R07.9 CHEST PAIN: ICD-10-CM

## 2021-08-18 DIAGNOSIS — R10.13 EPIGASTRIC PAIN: ICD-10-CM

## 2021-08-18 DIAGNOSIS — M94.0 COSTOCHONDRITIS, ACUTE: Primary | ICD-10-CM

## 2021-08-18 LAB
B-HCG UR QL: NEGATIVE
CTP QC/QA: YES
CTP QC/QA: YES
SARS-COV-2 RDRP RESP QL NAA+PROBE: NEGATIVE

## 2021-08-18 PROCEDURE — U0002 COVID-19 LAB TEST NON-CDC: HCPCS | Performed by: EMERGENCY MEDICINE

## 2021-08-18 PROCEDURE — 25000003 PHARM REV CODE 250: Performed by: EMERGENCY MEDICINE

## 2021-08-18 PROCEDURE — 81025 URINE PREGNANCY TEST: CPT | Performed by: EMERGENCY MEDICINE

## 2021-08-18 PROCEDURE — 93010 ELECTROCARDIOGRAM REPORT: CPT | Mod: ,,, | Performed by: INTERNAL MEDICINE

## 2021-08-18 PROCEDURE — 99284 EMERGENCY DEPT VISIT MOD MDM: CPT | Mod: 25

## 2021-08-18 PROCEDURE — 93005 ELECTROCARDIOGRAM TRACING: CPT

## 2021-08-18 PROCEDURE — 93010 EKG 12-LEAD: ICD-10-PCS | Mod: ,,, | Performed by: INTERNAL MEDICINE

## 2021-08-18 PROCEDURE — 63600175 PHARM REV CODE 636 W HCPCS: Performed by: EMERGENCY MEDICINE

## 2021-08-18 PROCEDURE — 96372 THER/PROPH/DIAG INJ SC/IM: CPT

## 2021-08-18 RX ORDER — LIDOCAINE 50 MG/G
1 PATCH TOPICAL
Status: DISCONTINUED | OUTPATIENT
Start: 2021-08-18 | End: 2021-08-19 | Stop reason: HOSPADM

## 2021-08-18 RX ORDER — IBUPROFEN 600 MG/1
600 TABLET ORAL EVERY 6 HOURS PRN
Qty: 20 TABLET | Refills: 0 | Status: SHIPPED | OUTPATIENT
Start: 2021-08-18 | End: 2022-02-11 | Stop reason: ALTCHOICE

## 2021-08-18 RX ORDER — DICYCLOMINE HYDROCHLORIDE 10 MG/1
10 CAPSULE ORAL
Status: COMPLETED | OUTPATIENT
Start: 2021-08-18 | End: 2021-08-18

## 2021-08-18 RX ORDER — KETOROLAC TROMETHAMINE 30 MG/ML
15 INJECTION, SOLUTION INTRAMUSCULAR; INTRAVENOUS
Status: COMPLETED | OUTPATIENT
Start: 2021-08-18 | End: 2021-08-18

## 2021-08-18 RX ORDER — LIDOCAINE 50 MG/G
1 PATCH TOPICAL DAILY
Qty: 30 PATCH | Refills: 0 | Status: SHIPPED | OUTPATIENT
Start: 2021-08-18 | End: 2022-02-11 | Stop reason: ALTCHOICE

## 2021-08-18 RX ORDER — MAG HYDROX/ALUMINUM HYD/SIMETH 200-200-20
15 SUSPENSION, ORAL (FINAL DOSE FORM) ORAL
Status: COMPLETED | OUTPATIENT
Start: 2021-08-18 | End: 2021-08-18

## 2021-08-18 RX ORDER — DICYCLOMINE HYDROCHLORIDE 20 MG/1
20 TABLET ORAL 2 TIMES DAILY PRN
Qty: 20 TABLET | Refills: 0 | Status: SHIPPED | OUTPATIENT
Start: 2021-08-18 | End: 2021-08-28

## 2021-08-18 RX ADMIN — ALUMINUM HYDROXIDE, MAGNESIUM HYDROXIDE, AND SIMETHICONE 15 ML: 200; 200; 20 SUSPENSION ORAL at 10:08

## 2021-08-18 RX ADMIN — DICYCLOMINE HYDROCHLORIDE 10 MG: 10 CAPSULE ORAL at 10:08

## 2021-08-18 RX ADMIN — LIDOCAINE 1 PATCH: 50 PATCH TOPICAL at 10:08

## 2021-08-18 RX ADMIN — KETOROLAC TROMETHAMINE 15 MG: 30 INJECTION, SOLUTION INTRAMUSCULAR; INTRAVENOUS at 10:08

## 2021-08-19 ENCOUNTER — OFFICE VISIT (OUTPATIENT)
Dept: OBSTETRICS AND GYNECOLOGY | Facility: CLINIC | Age: 21
End: 2021-08-19
Payer: MEDICAID

## 2021-08-19 VITALS
BODY MASS INDEX: 20.9 KG/M2 | WEIGHT: 107 LBS | SYSTOLIC BLOOD PRESSURE: 129 MMHG | DIASTOLIC BLOOD PRESSURE: 85 MMHG | OXYGEN SATURATION: 100 % | HEART RATE: 97 BPM | TEMPERATURE: 99 F | RESPIRATION RATE: 16 BRPM

## 2021-08-19 VITALS
SYSTOLIC BLOOD PRESSURE: 98 MMHG | HEIGHT: 60 IN | DIASTOLIC BLOOD PRESSURE: 60 MMHG | BODY MASS INDEX: 20.82 KG/M2 | WEIGHT: 106.06 LBS

## 2021-08-19 DIAGNOSIS — Z11.3 SCREEN FOR STD (SEXUALLY TRANSMITTED DISEASE): Primary | ICD-10-CM

## 2021-08-19 PROCEDURE — 87660 TRICHOMONAS VAGIN DIR PROBE: CPT | Performed by: REGISTERED NURSE

## 2021-08-19 PROCEDURE — 99999 PR PBB SHADOW E&M-EST. PATIENT-LVL III: ICD-10-PCS | Mod: PBBFAC,,,

## 2021-08-19 PROCEDURE — 99212 OFFICE O/P EST SF 10 MIN: CPT | Mod: S$PBB,,, | Performed by: OBSTETRICS & GYNECOLOGY

## 2021-08-19 PROCEDURE — 87591 N.GONORRHOEAE DNA AMP PROB: CPT | Performed by: REGISTERED NURSE

## 2021-08-19 PROCEDURE — 87510 GARDNER VAG DNA DIR PROBE: CPT | Performed by: REGISTERED NURSE

## 2021-08-19 PROCEDURE — 87491 CHLMYD TRACH DNA AMP PROBE: CPT | Performed by: REGISTERED NURSE

## 2021-08-19 PROCEDURE — 99999 PR PBB SHADOW E&M-EST. PATIENT-LVL III: CPT | Mod: PBBFAC,,,

## 2021-08-19 PROCEDURE — 99213 OFFICE O/P EST LOW 20 MIN: CPT | Mod: PBBFAC

## 2021-08-19 PROCEDURE — 99212 PR OFFICE/OUTPT VISIT, EST, LEVL II, 10-19 MIN: ICD-10-PCS | Mod: S$PBB,,, | Performed by: OBSTETRICS & GYNECOLOGY

## 2021-08-20 ENCOUNTER — TELEPHONE (OUTPATIENT)
Dept: OBSTETRICS AND GYNECOLOGY | Facility: CLINIC | Age: 21
End: 2021-08-20

## 2021-08-21 LAB
C TRACH DNA SPEC QL NAA+PROBE: NOT DETECTED
CANDIDA RRNA VAG QL PROBE: NEGATIVE
G VAGINALIS RRNA GENITAL QL PROBE: POSITIVE
N GONORRHOEA DNA SPEC QL NAA+PROBE: NOT DETECTED
T VAGINALIS RRNA GENITAL QL PROBE: NEGATIVE

## 2021-08-23 DIAGNOSIS — T36.95XA ANTIBIOTIC-INDUCED YEAST INFECTION: Primary | ICD-10-CM

## 2021-08-23 DIAGNOSIS — B96.89 BACTERIAL VAGINOSIS: ICD-10-CM

## 2021-08-23 DIAGNOSIS — B37.9 ANTIBIOTIC-INDUCED YEAST INFECTION: Primary | ICD-10-CM

## 2021-08-23 DIAGNOSIS — N76.0 BACTERIAL VAGINOSIS: ICD-10-CM

## 2021-08-23 RX ORDER — METRONIDAZOLE 500 MG/1
500 TABLET ORAL 2 TIMES DAILY
Qty: 14 TABLET | Refills: 0 | Status: SHIPPED | OUTPATIENT
Start: 2021-08-23 | End: 2021-08-30

## 2021-08-23 RX ORDER — FLUCONAZOLE 150 MG/1
150 TABLET ORAL ONCE
Qty: 1 TABLET | Refills: 1 | Status: SHIPPED | OUTPATIENT
Start: 2021-08-23 | End: 2021-08-23

## 2021-11-16 ENCOUNTER — OFFICE VISIT (OUTPATIENT)
Dept: OBSTETRICS AND GYNECOLOGY | Facility: CLINIC | Age: 21
End: 2021-11-16
Payer: MEDICAID

## 2021-11-16 VITALS
DIASTOLIC BLOOD PRESSURE: 72 MMHG | BODY MASS INDEX: 20.65 KG/M2 | SYSTOLIC BLOOD PRESSURE: 124 MMHG | WEIGHT: 105.19 LBS | HEIGHT: 60 IN

## 2021-11-16 DIAGNOSIS — Z30.012 EMERGENCY CONTRACEPTION: ICD-10-CM

## 2021-11-16 DIAGNOSIS — Z11.3 SCREENING EXAMINATION FOR STD (SEXUALLY TRANSMITTED DISEASE): Primary | ICD-10-CM

## 2021-11-16 PROCEDURE — 99213 OFFICE O/P EST LOW 20 MIN: CPT | Mod: PBBFAC,PN | Performed by: OBSTETRICS & GYNECOLOGY

## 2021-11-16 PROCEDURE — 99999 PR PBB SHADOW E&M-EST. PATIENT-LVL III: ICD-10-PCS | Mod: PBBFAC,,, | Performed by: OBSTETRICS & GYNECOLOGY

## 2021-11-16 PROCEDURE — 87591 N.GONORRHOEAE DNA AMP PROB: CPT | Performed by: OBSTETRICS & GYNECOLOGY

## 2021-11-16 PROCEDURE — 99213 PR OFFICE/OUTPT VISIT, EST, LEVL III, 20-29 MIN: ICD-10-PCS | Mod: S$PBB,,, | Performed by: OBSTETRICS & GYNECOLOGY

## 2021-11-16 PROCEDURE — 99999 PR PBB SHADOW E&M-EST. PATIENT-LVL III: CPT | Mod: PBBFAC,,, | Performed by: OBSTETRICS & GYNECOLOGY

## 2021-11-16 PROCEDURE — 99213 OFFICE O/P EST LOW 20 MIN: CPT | Mod: S$PBB,,, | Performed by: OBSTETRICS & GYNECOLOGY

## 2021-11-16 PROCEDURE — 87491 CHLMYD TRACH DNA AMP PROBE: CPT | Performed by: OBSTETRICS & GYNECOLOGY

## 2021-11-18 LAB
C TRACH DNA SPEC QL NAA+PROBE: NOT DETECTED
N GONORRHOEA DNA SPEC QL NAA+PROBE: NOT DETECTED

## 2022-02-11 ENCOUNTER — OFFICE VISIT (OUTPATIENT)
Dept: OBSTETRICS AND GYNECOLOGY | Facility: CLINIC | Age: 22
End: 2022-02-11
Payer: MEDICAID

## 2022-02-11 VITALS
BODY MASS INDEX: 19.3 KG/M2 | HEIGHT: 60 IN | WEIGHT: 98.31 LBS | DIASTOLIC BLOOD PRESSURE: 76 MMHG | SYSTOLIC BLOOD PRESSURE: 116 MMHG

## 2022-02-11 DIAGNOSIS — Z00.00 HEALTHCARE MAINTENANCE: Primary | ICD-10-CM

## 2022-02-11 PROBLEM — Z30.017 ENCOUNTER FOR INITIAL PRESCRIPTION OF IMPLANTABLE SUBDERMAL CONTRACEPTIVE: Status: RESOLVED | Noted: 2019-10-15 | Resolved: 2022-02-11

## 2022-02-11 PROCEDURE — 99499 UNLISTED E&M SERVICE: CPT | Mod: S$PBB,,, | Performed by: OBSTETRICS & GYNECOLOGY

## 2022-02-11 PROCEDURE — 3078F DIAST BP <80 MM HG: CPT | Mod: CPTII,,, | Performed by: OBSTETRICS & GYNECOLOGY

## 2022-02-11 PROCEDURE — 1159F MED LIST DOCD IN RCRD: CPT | Mod: CPTII,,, | Performed by: OBSTETRICS & GYNECOLOGY

## 2022-02-11 PROCEDURE — 99999 PR PBB SHADOW E&M-EST. PATIENT-LVL III: ICD-10-PCS | Mod: PBBFAC,,, | Performed by: OBSTETRICS & GYNECOLOGY

## 2022-02-11 PROCEDURE — 99999 PR PBB SHADOW E&M-EST. PATIENT-LVL III: CPT | Mod: PBBFAC,,, | Performed by: OBSTETRICS & GYNECOLOGY

## 2022-02-11 PROCEDURE — 3074F PR MOST RECENT SYSTOLIC BLOOD PRESSURE < 130 MM HG: ICD-10-PCS | Mod: CPTII,,, | Performed by: OBSTETRICS & GYNECOLOGY

## 2022-02-11 PROCEDURE — 3078F PR MOST RECENT DIASTOLIC BLOOD PRESSURE < 80 MM HG: ICD-10-PCS | Mod: CPTII,,, | Performed by: OBSTETRICS & GYNECOLOGY

## 2022-02-11 PROCEDURE — 3074F SYST BP LT 130 MM HG: CPT | Mod: CPTII,,, | Performed by: OBSTETRICS & GYNECOLOGY

## 2022-02-11 PROCEDURE — 99213 OFFICE O/P EST LOW 20 MIN: CPT | Mod: PBBFAC,PN | Performed by: OBSTETRICS & GYNECOLOGY

## 2022-02-11 PROCEDURE — 3008F BODY MASS INDEX DOCD: CPT | Mod: CPTII,,, | Performed by: OBSTETRICS & GYNECOLOGY

## 2022-02-11 PROCEDURE — 1159F PR MEDICATION LIST DOCUMENTED IN MEDICAL RECORD: ICD-10-PCS | Mod: CPTII,,, | Performed by: OBSTETRICS & GYNECOLOGY

## 2022-02-11 PROCEDURE — 99499 NO LOS: ICD-10-PCS | Mod: S$PBB,,, | Performed by: OBSTETRICS & GYNECOLOGY

## 2022-02-11 PROCEDURE — 3008F PR BODY MASS INDEX (BMI) DOCUMENTED: ICD-10-PCS | Mod: CPTII,,, | Performed by: OBSTETRICS & GYNECOLOGY

## 2022-02-11 RX ORDER — FLUOXETINE 10 MG/1
10 CAPSULE ORAL DAILY
COMMUNITY
Start: 2022-01-05 | End: 2022-03-22

## 2022-02-21 ENCOUNTER — PATIENT MESSAGE (OUTPATIENT)
Dept: OBSTETRICS AND GYNECOLOGY | Facility: CLINIC | Age: 22
End: 2022-02-21
Payer: MEDICAID

## 2022-02-21 DIAGNOSIS — N94.6 DYSMENORRHEA: Primary | ICD-10-CM

## 2022-02-21 RX ORDER — IBUPROFEN 800 MG/1
800 TABLET ORAL EVERY 8 HOURS PRN
Qty: 20 TABLET | Refills: 2 | Status: SHIPPED | OUTPATIENT
Start: 2022-02-21 | End: 2022-10-10 | Stop reason: SDUPTHER

## 2022-03-22 ENCOUNTER — OFFICE VISIT (OUTPATIENT)
Dept: OBSTETRICS AND GYNECOLOGY | Facility: CLINIC | Age: 22
End: 2022-03-22
Payer: MEDICAID

## 2022-03-22 VITALS
SYSTOLIC BLOOD PRESSURE: 100 MMHG | HEIGHT: 60 IN | BODY MASS INDEX: 19.04 KG/M2 | DIASTOLIC BLOOD PRESSURE: 68 MMHG | WEIGHT: 97 LBS

## 2022-03-22 DIAGNOSIS — Z30.011 ENCOUNTER FOR INITIAL PRESCRIPTION OF CONTRACEPTIVE PILLS: ICD-10-CM

## 2022-03-22 DIAGNOSIS — N89.8 VAGINAL ODOR: Primary | ICD-10-CM

## 2022-03-22 PROCEDURE — 99213 OFFICE O/P EST LOW 20 MIN: CPT | Mod: S$PBB,,, | Performed by: OBSTETRICS & GYNECOLOGY

## 2022-03-22 PROCEDURE — 3074F SYST BP LT 130 MM HG: CPT | Mod: CPTII,,, | Performed by: OBSTETRICS & GYNECOLOGY

## 2022-03-22 PROCEDURE — 87481 CANDIDA DNA AMP PROBE: CPT | Mod: 59 | Performed by: OBSTETRICS & GYNECOLOGY

## 2022-03-22 PROCEDURE — 1159F PR MEDICATION LIST DOCUMENTED IN MEDICAL RECORD: ICD-10-PCS | Mod: CPTII,,, | Performed by: OBSTETRICS & GYNECOLOGY

## 2022-03-22 PROCEDURE — 1159F MED LIST DOCD IN RCRD: CPT | Mod: CPTII,,, | Performed by: OBSTETRICS & GYNECOLOGY

## 2022-03-22 PROCEDURE — 87801 DETECT AGNT MULT DNA AMPLI: CPT | Performed by: OBSTETRICS & GYNECOLOGY

## 2022-03-22 PROCEDURE — 99999 PR PBB SHADOW E&M-EST. PATIENT-LVL II: ICD-10-PCS | Mod: PBBFAC,,, | Performed by: OBSTETRICS & GYNECOLOGY

## 2022-03-22 PROCEDURE — 3008F PR BODY MASS INDEX (BMI) DOCUMENTED: ICD-10-PCS | Mod: CPTII,,, | Performed by: OBSTETRICS & GYNECOLOGY

## 2022-03-22 PROCEDURE — 87591 N.GONORRHOEAE DNA AMP PROB: CPT | Performed by: OBSTETRICS & GYNECOLOGY

## 2022-03-22 PROCEDURE — 3008F BODY MASS INDEX DOCD: CPT | Mod: CPTII,,, | Performed by: OBSTETRICS & GYNECOLOGY

## 2022-03-22 PROCEDURE — 3078F DIAST BP <80 MM HG: CPT | Mod: CPTII,,, | Performed by: OBSTETRICS & GYNECOLOGY

## 2022-03-22 PROCEDURE — 99213 PR OFFICE/OUTPT VISIT, EST, LEVL III, 20-29 MIN: ICD-10-PCS | Mod: S$PBB,,, | Performed by: OBSTETRICS & GYNECOLOGY

## 2022-03-22 PROCEDURE — 3074F PR MOST RECENT SYSTOLIC BLOOD PRESSURE < 130 MM HG: ICD-10-PCS | Mod: CPTII,,, | Performed by: OBSTETRICS & GYNECOLOGY

## 2022-03-22 PROCEDURE — 99999 PR PBB SHADOW E&M-EST. PATIENT-LVL II: CPT | Mod: PBBFAC,,, | Performed by: OBSTETRICS & GYNECOLOGY

## 2022-03-22 PROCEDURE — 99212 OFFICE O/P EST SF 10 MIN: CPT | Mod: PBBFAC,PN | Performed by: OBSTETRICS & GYNECOLOGY

## 2022-03-22 PROCEDURE — 3078F PR MOST RECENT DIASTOLIC BLOOD PRESSURE < 80 MM HG: ICD-10-PCS | Mod: CPTII,,, | Performed by: OBSTETRICS & GYNECOLOGY

## 2022-03-22 PROCEDURE — 87491 CHLMYD TRACH DNA AMP PROBE: CPT | Performed by: OBSTETRICS & GYNECOLOGY

## 2022-03-22 RX ORDER — LEVONORGESTREL AND ETHINYL ESTRADIOL 0.1-0.02MG
1 KIT ORAL DAILY
Qty: 84 TABLET | Refills: 4 | Status: SHIPPED | OUTPATIENT
Start: 2022-03-22 | End: 2023-01-03 | Stop reason: CLARIF

## 2022-03-22 NOTE — PROGRESS NOTES
Reason for visit: STD CHECK      HPI:    21 y.o. female     Patient's last menstrual period was 2022.     C/o vaginal odor and itching since last Friday after intercourse (no condom used)    Contraception: condoms intermittently      Reviewed:    Pap: 2021, NILM  Mammogram: N/A    Past medical, surgical, social, family, and obstetric history: Reviewed and updated in EMR.  Medications: Reviewed and updated in EMR.  Allergies: Patient has no known allergies.    Prior records and results: n/a  Outside records: n/a  Independent interpretation of tests: n/a  Discussion with another healthcare professional: n/a      Vitals: /68   Ht 5' (1.524 m)   Wt 44 kg (97 lb)   LMP 2022   BMI 18.94 kg/m²     Physical Exam  Genitourinary:      Vulva normal.      Right Labia: No rash, lesions or Bartholin's cyst.     Left Labia: No lesions, Bartholin's cyst or rash.     No vaginal discharge or bleeding.        Right Adnexa: not tender and not full.     Left Adnexa: not tender and not full.     No cervical motion tenderness, discharge or lesion.      Uterus is not enlarged or tender.      Pelvic exam was performed with patient in the lithotomy position.   Exam conducted with a chaperone present.           Assessment & Plan:    Vaginal odor  -     Vaginosis Screen by DNA Probe  -     C. trachomatis/N. gonorrhoeae by AMP DNA [facility]; Cervicovaginal    Encounter for initial prescription of contraceptive pills  -     levonorgestrel-ethinyl estradiol (AVIANE,ALESSE,LESSINA) 0.1-20 mg-mcg per tablet; Take 1 tablet by mouth once daily.  Dispense: 84 tablet; Refill: 4         Patient out of window for emergency contraception. Told patient to take UPT in 2-4wk.    Discussed normal vaginal pH and factors that can lead to BV. Vulvar care guidelines discussed.   Exam WNL. Affirm sent. Desires STD testing - sent.   Contraception discussed, patient desires OCPs with possible hormonal IUD in future. Patient  already has pamphlets for IUD from last visit. Will let us know when she decides.

## 2022-03-23 LAB
C TRACH DNA SPEC QL NAA+PROBE: NOT DETECTED
N GONORRHOEA DNA SPEC QL NAA+PROBE: NOT DETECTED

## 2022-03-24 DIAGNOSIS — N76.0 BV (BACTERIAL VAGINOSIS): Primary | ICD-10-CM

## 2022-03-24 DIAGNOSIS — B96.89 BV (BACTERIAL VAGINOSIS): Primary | ICD-10-CM

## 2022-03-24 LAB
BACTERIAL VAGINOSIS DNA: POSITIVE
CANDIDA GLABRATA DNA: NEGATIVE
CANDIDA KRUSEI DNA: NEGATIVE
CANDIDA RRNA VAG QL PROBE: NEGATIVE
T VAGINALIS RRNA GENITAL QL PROBE: NEGATIVE

## 2022-03-24 RX ORDER — METRONIDAZOLE 500 MG/1
500 TABLET ORAL EVERY 12 HOURS
Qty: 14 TABLET | Refills: 0 | Status: SHIPPED | OUTPATIENT
Start: 2022-03-24 | End: 2022-03-31

## 2022-03-30 ENCOUNTER — PATIENT MESSAGE (OUTPATIENT)
Dept: OBSTETRICS AND GYNECOLOGY | Facility: CLINIC | Age: 22
End: 2022-03-30
Payer: MEDICAID

## 2022-04-29 ENCOUNTER — TELEPHONE (OUTPATIENT)
Dept: NEUROLOGY | Facility: CLINIC | Age: 22
End: 2022-04-29
Payer: MEDICAID

## 2022-04-29 NOTE — TELEPHONE ENCOUNTER
----- Message from Mary Ellen Everett MA sent at 4/29/2022  1:42 PM CDT -----  Type:  Patient Returning Call    Who Called:pt  Does the patient know what this is regarding?:yes  Would the patient rather a call back or a response via [employer]? Call back  Best Call Back Number:[phone]  Additional Information: pt needs an appt scheduled

## 2022-04-29 NOTE — TELEPHONE ENCOUNTER
Offered 7 day release with   ROSALINDA Ball on 05/17/22 at 10am. Accepted offer. Will schedule once released.    [Follow-Up] : a follow-up evaluation of

## 2022-05-05 ENCOUNTER — PATIENT MESSAGE (OUTPATIENT)
Dept: OBSTETRICS AND GYNECOLOGY | Facility: CLINIC | Age: 22
End: 2022-05-05
Payer: MEDICAID

## 2022-05-05 DIAGNOSIS — N89.8 VAGINAL ITCHING: Primary | ICD-10-CM

## 2022-05-05 RX ORDER — FLUCONAZOLE 150 MG/1
150 TABLET ORAL ONCE
Qty: 1 TABLET | Refills: 1 | Status: SHIPPED | OUTPATIENT
Start: 2022-05-05 | End: 2022-05-05

## 2022-05-17 ENCOUNTER — OFFICE VISIT (OUTPATIENT)
Dept: NEUROLOGY | Facility: CLINIC | Age: 22
End: 2022-05-17
Payer: MEDICAID

## 2022-05-17 VITALS
HEART RATE: 85 BPM | SYSTOLIC BLOOD PRESSURE: 133 MMHG | WEIGHT: 102.5 LBS | DIASTOLIC BLOOD PRESSURE: 95 MMHG | HEIGHT: 60 IN | BODY MASS INDEX: 20.12 KG/M2

## 2022-05-17 DIAGNOSIS — I10 ELEVATED BLOOD PRESSURE READING IN OFFICE WITH DIAGNOSIS OF HYPERTENSION: ICD-10-CM

## 2022-05-17 DIAGNOSIS — G43.009 MIGRAINE WITHOUT AURA AND WITHOUT STATUS MIGRAINOSUS, NOT INTRACTABLE: Primary | ICD-10-CM

## 2022-05-17 PROBLEM — R03.0 ELEVATED BLOOD-PRESSURE READING WITHOUT DIAGNOSIS OF HYPERTENSION: Status: RESOLVED | Noted: 2022-05-17 | Resolved: 2022-05-17

## 2022-05-17 PROBLEM — R03.0 ELEVATED BLOOD-PRESSURE READING WITHOUT DIAGNOSIS OF HYPERTENSION: Status: ACTIVE | Noted: 2022-05-17

## 2022-05-17 PROCEDURE — 3080F PR MOST RECENT DIASTOLIC BLOOD PRESSURE >= 90 MM HG: ICD-10-PCS | Mod: CPTII,,, | Performed by: PHYSICIAN ASSISTANT

## 2022-05-17 PROCEDURE — 3008F PR BODY MASS INDEX (BMI) DOCUMENTED: ICD-10-PCS | Mod: CPTII,,, | Performed by: PHYSICIAN ASSISTANT

## 2022-05-17 PROCEDURE — 99204 OFFICE O/P NEW MOD 45 MIN: CPT | Mod: S$PBB,,, | Performed by: PHYSICIAN ASSISTANT

## 2022-05-17 PROCEDURE — 3008F BODY MASS INDEX DOCD: CPT | Mod: CPTII,,, | Performed by: PHYSICIAN ASSISTANT

## 2022-05-17 PROCEDURE — 1160F PR REVIEW ALL MEDS BY PRESCRIBER/CLIN PHARMACIST DOCUMENTED: ICD-10-PCS | Mod: CPTII,,, | Performed by: PHYSICIAN ASSISTANT

## 2022-05-17 PROCEDURE — 3075F PR MOST RECENT SYSTOLIC BLOOD PRESS GE 130-139MM HG: ICD-10-PCS | Mod: CPTII,,, | Performed by: PHYSICIAN ASSISTANT

## 2022-05-17 PROCEDURE — 99204 PR OFFICE/OUTPT VISIT, NEW, LEVL IV, 45-59 MIN: ICD-10-PCS | Mod: S$PBB,,, | Performed by: PHYSICIAN ASSISTANT

## 2022-05-17 PROCEDURE — 99999 PR PBB SHADOW E&M-EST. PATIENT-LVL III: CPT | Mod: PBBFAC,,, | Performed by: PHYSICIAN ASSISTANT

## 2022-05-17 PROCEDURE — 1160F RVW MEDS BY RX/DR IN RCRD: CPT | Mod: CPTII,,, | Performed by: PHYSICIAN ASSISTANT

## 2022-05-17 PROCEDURE — 3075F SYST BP GE 130 - 139MM HG: CPT | Mod: CPTII,,, | Performed by: PHYSICIAN ASSISTANT

## 2022-05-17 PROCEDURE — 1159F PR MEDICATION LIST DOCUMENTED IN MEDICAL RECORD: ICD-10-PCS | Mod: CPTII,,, | Performed by: PHYSICIAN ASSISTANT

## 2022-05-17 PROCEDURE — 99999 PR PBB SHADOW E&M-EST. PATIENT-LVL III: ICD-10-PCS | Mod: PBBFAC,,, | Performed by: PHYSICIAN ASSISTANT

## 2022-05-17 PROCEDURE — 1159F MED LIST DOCD IN RCRD: CPT | Mod: CPTII,,, | Performed by: PHYSICIAN ASSISTANT

## 2022-05-17 PROCEDURE — 99213 OFFICE O/P EST LOW 20 MIN: CPT | Mod: PBBFAC | Performed by: PHYSICIAN ASSISTANT

## 2022-05-17 PROCEDURE — 3080F DIAST BP >= 90 MM HG: CPT | Mod: CPTII,,, | Performed by: PHYSICIAN ASSISTANT

## 2022-05-17 RX ORDER — PROPRANOLOL HYDROCHLORIDE 20 MG/1
20 TABLET ORAL DAILY
Qty: 30 TABLET | Refills: 1 | Status: SHIPPED | OUTPATIENT
Start: 2022-05-17 | End: 2023-05-17

## 2022-05-17 RX ORDER — RIZATRIPTAN BENZOATE 10 MG/1
TABLET, ORALLY DISINTEGRATING ORAL
Qty: 12 TABLET | Refills: 5 | Status: SHIPPED | OUTPATIENT
Start: 2022-05-17 | End: 2023-05-17 | Stop reason: SDUPTHER

## 2022-05-17 NOTE — PROGRESS NOTES
Subjective:         Patient ID: Danette Ang is a 21 y.o. female.  Referred by:   Aaareferral Self  No address on file  Chief Complaint:  Consult and Headache      History of Present Illness  This is a 21 y.o. female who presents to clinic alone for evaluation of headaches  She notes she has been experiencing migraine headaches since she was first diagnosed in 6th grade and previously saw neurologist at [facility] She describes her migraine headaches as a throbbing pain, that feels like her head is going to explode, this pain occurs in her frontalis and parietal region near the top of her head on both sides and notes her headaches are associated with neck pain, photophobia, senstivity to sound and smell, dizziness, watery eyes. She notes her migraine headaches  tend to occur in clusters every 2-3 days a week and will usually last up to 12 hours but recently have started to last as long as 2-3 days, since beginning of February. She notes prior to February she would have 1 Headache every 2 weeks. She notes she has noticed that she her  migrianes occur more often and are more severe around her menstrual cycle , noting she will experience a migraine headache  either the day before or on the 1st day of her menstrual cycle and she will take an ibuprofen 800 mg and this will help relief the headache, but notes when she experiences a headache not related to her mesntrual cycle and she will often have to treat the headaches with 1st line ibuprofen 800 mg as well as 2 excedrin migraines every 8 hour. She notes this combination of medications does help relief her headaches, but notes she often has to take 4 Excedrin migraines in a day before she gets relief and she does want to have to take this much medication so frequently, notes she currently takes these medications at least 2-3 times a week. She notes she has tried sumatriptan and amitriptyline in the past for tx of her migraine headaches, but the sumatriptan did not help  relief her headaches and she did not like the way amitriptyline made her feel.   Previous Hx of HA -she also notes she experiences occasional tension type headaches and sinus headaches.  Location - pain occurs in her frontalis and parietal region near the top of her head on both sides. She  Describes her tension headaches are  a pressure like pain that occurs behind either  left eye and are not as intense as her migraine headaches and can be relieved with warm compress.   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    Caffeine- yes, coke 2 cans a day 2-3 days a week   Alcohol - denies  Smoke - denies    Medications tried and failed: toradol- helps, ibuprofen 800 mg- helps if HA near her menstrual cycle, Excedrin migraines (2), Imitrex, Amitriptyline, Cymbalta 10 mg       Past Medical History:   Diagnosis Date    Migraine headache     Scoliosis        Past Surgical History:   Procedure Laterality Date    BACK SURGERY  02/20/2018       Family History   Problem Relation Age of Onset    Hypertension Mother     Stroke Mother     Aneurysm Mother     Cancer Maternal Grandmother     Breast cancer Neg Hx     Colon cancer Neg Hx     Ovarian cancer Neg Hx        Social History     Socioeconomic History    Marital status: Single   Tobacco Use    Smoking status: Never Smoker    Smokeless tobacco: Never Used   Substance and Sexual Activity    Alcohol use: Never     Drug use: Never    Sexual activity: Yes     Partners: Male     Birth control/protection: None       Review of Systems: as per HPI, otherwise a balanced 10 systems review is negative  Objective:     Vitals:    05/17/22 1007   BP: (!) 133/95   Pulse: 85       Physical Exam   Constitutional: she appears well-developed and well-nourished. she is well groomed. NAD  HENT:    Head: Normocephalic and atraumatic, Frontalis was NTTP, temporalis was NTTP   Eyes: Conjunctivae and EOM are normal. Pupils are equal, round, and reactive to light   Neck: Neck supple.  No Tenderness to palpation bilateral cervical paraspinal musculature, suboccipital musculature  Musculoskeletal: Normal range of motion. No joint stiffness. No vertebral point tenderness.  Skin: Skin is warm and dry.  Psychiatric: Normal mood and affect.     Neuro exam:    Mental status:  The patient is alert and oriented to person, place and time.  Language is intact and fluent  Remote and recent memory are intact  Normal attention and concentration  Mood is stable    Cranial Nerves:  Pupils are equal and reactive to light.    Extraocular movements are intact and without nystagmus.    Visual fields are full to confrontation testing.   Facial movement is symmetric.  Facial sensation is intact.    Hearing is intact   FROM of neck in all (6) directions without pain  Shoulder shrug symmetrical.    Coordination:     Finger to nose - normal and symmetric bilaterally      Motor:  Normal muscle bulk and symmetry. No fasciculations were noted.   Tremor not apparent   Pronator drift not apparent.    strength was strong and symmetric  Finger extension strength was strong and symmetric  RUE:appropriate against gravity and medium force as tested 5/5  LUE: appropriate against gravity and medium force as tested 5/5  RLE:appropriate against gravity and medium force as tested 5/5              LLE: appropriate against gravity and medium force as tested 5/5    Reflexes:  Right  "Brachioradialis 2+  Left Brachioradialis 2+  Right Biceps 2+  Left Biceps 2+  Right Patellar2+  Left Patellar 2+  Right Achilles 2+  Left Achilles 2+                          Curtis was negative    Sensory:  RUE  intact light touch  LUE intact light touch  RLE intact light touch  LLE intact light touch    Gait:   Romberg - negative  Normal gait    IMAGING Reviewed:   No results found for this or any previous visit.     Note: I have independently reviewed any/all imaging/labs/tests and agree with the report (s) as documented.  Any discrepancies will be as noted/demarcated by free text. MAUDE HOYOS 5/17/2022  Assessment/Plan:     Problem List Items Addressed This Visit        Neuro    Migraine without aura and without status migrainosus, not intractable - Primary    Relevant Medications    rizatriptan (MAXALT-MLT) 10 MG disintegrating tablet    propranoloL (INDERAL) 20 MG tablet      Other Visit Diagnoses     Elevated blood pressure reading in office with diagnosis of hypertension              This is a 21 y.o. female who presents for evaluation of headaches. Discussed symptoms appear to be consistent with migraine headaches with possible coexisting tension type headaches, discussed treatment options and patient agreed with the following plan:  Preventative medication - trial of propranolol 20 mg daily for preventative tx of migraine headaches and elevated BP reading of 133/95 in office today. discussed side effects of the medications and advised patient to monitor if BP drops too low and to notfiy me if this occurs.   We will start at a lower dose and increase as tolerated.   Abortive medication- trial of maxalt as needed for headaches, discussed side effects of medication. Offered alternative tx options   Instructed patient  to start tracking headaches, discussed "Migraine Buddy" abrhaan for smart phone headache diary and to look out for particular triggers of headaches.Discussed the importance of practicing good sleep " hygiene, healthy diet, and minimizing caffeine intake to a maximum of 100-200 mg /day.     I will see patient back for follow up in 2 months, at that time will consider trial of ubrelvy 50 mg for abortive tx of migraine headaches if maxalt fails to relieve her headache pain     I have discussed realistic goals of care with patient at length as well as medication options, and need for lifestyle adjustment. I have explained that treatment will take time. We have agreed that the goal will be to reduce frequency/intensity/quantity of HA, not to be completely HA free. Patient agreeable to work on lifestyle adjustments.  The patient verbalizes understanding and agreement with the treatment plan. Questions were sought and answered to patients stated verbal satisfaction.     Juliana Key PA-C  [facility]  Department of Neurology     Collaborating Physician, Dr. Betancourt, was available during today's encounter.

## 2022-07-08 ENCOUNTER — PATIENT MESSAGE (OUTPATIENT)
Dept: OBSTETRICS AND GYNECOLOGY | Facility: CLINIC | Age: 22
End: 2022-07-08
Payer: MEDICAID

## 2022-07-20 ENCOUNTER — HOSPITAL ENCOUNTER (EMERGENCY)
Facility: HOSPITAL | Age: 22
Discharge: HOME | End: 2022-07-20
Attending: EMERGENCY MEDICINE
Payer: MEDICAID

## 2022-07-20 VITALS
HEART RATE: 85 BPM | TEMPERATURE: 98 F | RESPIRATION RATE: 18 BRPM | SYSTOLIC BLOOD PRESSURE: 133 MMHG | OXYGEN SATURATION: 100 % | DIASTOLIC BLOOD PRESSURE: 91 MMHG

## 2022-07-20 DIAGNOSIS — U07.1 COVID-19 VIRUS INFECTION: Primary | ICD-10-CM

## 2022-07-20 LAB — SARS-COV-2 RDRP RESP QL NAA+PROBE: NEGATIVE

## 2022-07-20 PROCEDURE — 99282 EMERGENCY DEPT VISIT SF MDM: CPT | Mod: ,,, | Performed by: EMERGENCY MEDICINE

## 2022-07-20 PROCEDURE — 99282 EMERGENCY DEPT VISIT SF MDM: CPT | Mod: 25

## 2022-07-20 PROCEDURE — 99282 PR EMERGENCY DEPT VISIT,LEVEL II: ICD-10-PCS | Mod: ,,, | Performed by: EMERGENCY MEDICINE

## 2022-07-20 PROCEDURE — U0002 COVID-19 LAB TEST NON-CDC: HCPCS | Performed by: EMERGENCY MEDICINE

## 2022-07-20 NOTE — ED TRIAGE NOTES
Pt states that she has been feeling, nausea HA, dizziness, chills and SOB since Thursday, Pt states that she was feeling a little better then started to feel worse yesterday. Pt states that she took Ibuprofen for her HA tonight at around 2300 without relief. Pt states that she tested positive for COVID with an out home test.

## 2022-07-20 NOTE — ED PROVIDER NOTES
Encounter Date: 7/20/2022       History     Chief Complaint   Patient presents with    COVID-19 Concerns     Fever, body aches, chills that started tonight. Pt works upstiars     HPI   23 Y/O healthy F with recent exposure to COVID-19 C/O 2-3 days of generalized malaise, nasal congestion and slight cough with no associated dizziness, neck pain/stiffnes, dyspnea or POLANCO.  She took a home COVID test, which was positive and was unsure what to do since she is a [employer] employee, leading to her ED visit today in the a.m..  Nasal congestion with non-purulent discharge and pressure to maxillary and frontal region refered to as frontal headache.  No loss of taste and scent. Despite sore throat, no change in voice, drooling or dysphagia to solids or liquids is reported. No hemoptysis, sick contacts, recent travel and no Hx of TB or exposure to TB. No N/V, abd/back/chest pain. Otherwise, no change in PO intake and no diarrhea/change in BMs.    Review of patient's allergies indicates:  No Known Allergies  Past Medical History:   Diagnosis Date    Migraine headache     Scoliosis      Past Surgical History:   Procedure Laterality Date    BACK SURGERY  02/20/2018     Family History   Problem Relation Age of Onset    Hypertension Mother     Stroke Mother     Aneurysm Mother     Cancer Maternal Grandmother     Breast cancer Neg Hx     Colon cancer Neg Hx     Ovarian cancer Neg Hx      Social History     Tobacco Use    Smoking status: Never Smoker    Smokeless tobacco: Never Used   Substance Use Topics    Alcohol use: Never    Drug use: Never     Review of Systems  HEENT: +nasal congestion w/non-purulent rhinorrhea; + loss of taste and smell; No Sore throat, ear pain, headache, blurry vision or change in vision, eye pain, otorrhea, tooth pain, swelling, or voice changes.  NECK: No pain or stiffness, masses, trauma, or redness.  HEART: No pain, palpitations, diaphoresis, nausea, vomiting, or radiation of any pain  symptoms.  LUNG: + Cough, but no SOB, POLANCO or other complaints.  ABDOMEN: No pain, nausea, vomiting, diarrhea, constipation, or flank pain.  : No discharge, dysuria, urgency, hesitancy, frequency, lesions, rashes, masses, or sores.  EXTREMITIES: FROM and no swelling, redness, trauma, lesions, sores, weakness, numbness, or tingling.  SKIN: No lesions, rashes, trauma or other complaints.  NEURO: No dizziness, weakness, fatigue, tremors, nystagmus, headache, change in vision or disturbances of balance or coordination.      Physical Exam     Initial Vitals [07/20/22 0539]   BP Pulse Resp Temp SpO2   (!) 133/91 85 18 97.7 °F (36.5 °C) 100 %      MAP       --         Physical Exam  CONSTITUTIONAL: Calm. Cooperative. Non-toxic appearance. Well-developed and nourished. Sitting on chair/stretcher in no acute distress.  HEAD: NC/AT.  MOUTH/THROAT: Speaking Full Sentences with no drooling, hoarseness/muffled voice.   EYES: Anicteric  NECK: Full passive range of motion without pain and phonation normal. No stridor.  HEART: Tachy Rate with Reg Rhythm; normal pulses.  PULMONARY/CHEST: No Tachypnea, Effort normal and breath sounds normal. No accessory muscle usage. No respiratory distress. Lungs CTA B/L with No W/R/R.  ABDOMEN: Soft. ND. No TTP.  MUSCULOSKELETAL: FROM.  NEURO: AAOx3. Answering Questions Appropriately.  SKIN: Skin is warm, dry and intact. No rash noted.      ED Course   Procedures  Labs Reviewed - No data to display       Imaging Results    None          Medications - No data to display  Medical Decision Making:   Initial Assessment:   Hemodynamically stable, well-appearing [facility] presents with signs and symptoms of COVID infection.  COVID test at home positive.  Will perform COVID test either here in the ED or refer her to [facility] across the street when it opens in 20 minutes.  No need for inpatient management or further ED stay  ____________________  Jack Montague MD,  FAAEM  Emergency Medicine Staff  6:40 AM 7/20/2022                        Clinical Impression:   Final diagnoses:  [U07.1] COVID-19 virus infection (Primary)          ED Disposition Condition    Discharge Stable        ED Prescriptions     None        Follow-up Information    None          Curtis Montague MD  07/20/22 0635

## 2022-07-21 DIAGNOSIS — R68.83 CHILLS: Primary | ICD-10-CM

## 2022-07-21 LAB
CTP QC/QA: YES
SARS-COV-2 AG RESP QL IA.RAPID: NEGATIVE

## 2022-08-15 ENCOUNTER — OFFICE VISIT (OUTPATIENT)
Dept: OBSTETRICS AND GYNECOLOGY | Facility: CLINIC | Age: 22
End: 2022-08-15
Payer: MEDICAID

## 2022-08-15 VITALS — WEIGHT: 100.5 LBS | HEIGHT: 60 IN | BODY MASS INDEX: 19.73 KG/M2

## 2022-08-15 DIAGNOSIS — Z30.014 ENCOUNTER FOR INITIAL PRESCRIPTION OF INTRAUTERINE CONTRACEPTIVE DEVICE (IUD): Primary | ICD-10-CM

## 2022-08-15 DIAGNOSIS — Z11.3 SCREEN FOR STD (SEXUALLY TRANSMITTED DISEASE): ICD-10-CM

## 2022-08-15 PROCEDURE — 87591 N.GONORRHOEAE DNA AMP PROB: CPT | Mod: 59 | Performed by: OBSTETRICS & GYNECOLOGY

## 2022-08-15 PROCEDURE — 1159F MED LIST DOCD IN RCRD: CPT | Mod: CPTII,,, | Performed by: OBSTETRICS & GYNECOLOGY

## 2022-08-15 PROCEDURE — 99395 PREV VISIT EST AGE 18-39: CPT | Mod: S$PBB,,, | Performed by: OBSTETRICS & GYNECOLOGY

## 2022-08-15 PROCEDURE — 87801 DETECT AGNT MULT DNA AMPLI: CPT | Performed by: OBSTETRICS & GYNECOLOGY

## 2022-08-15 PROCEDURE — 1159F PR MEDICATION LIST DOCUMENTED IN MEDICAL RECORD: ICD-10-PCS | Mod: CPTII,,, | Performed by: OBSTETRICS & GYNECOLOGY

## 2022-08-15 PROCEDURE — 99999 PR PBB SHADOW E&M-EST. PATIENT-LVL III: CPT | Mod: PBBFAC,,, | Performed by: OBSTETRICS & GYNECOLOGY

## 2022-08-15 PROCEDURE — 99213 OFFICE O/P EST LOW 20 MIN: CPT | Mod: PBBFAC,PN | Performed by: OBSTETRICS & GYNECOLOGY

## 2022-08-15 PROCEDURE — 3008F PR BODY MASS INDEX (BMI) DOCUMENTED: ICD-10-PCS | Mod: CPTII,,, | Performed by: OBSTETRICS & GYNECOLOGY

## 2022-08-15 PROCEDURE — 87491 CHLMYD TRACH DNA AMP PROBE: CPT | Performed by: OBSTETRICS & GYNECOLOGY

## 2022-08-15 PROCEDURE — 99999 PR PBB SHADOW E&M-EST. PATIENT-LVL III: ICD-10-PCS | Mod: PBBFAC,,, | Performed by: OBSTETRICS & GYNECOLOGY

## 2022-08-15 PROCEDURE — 87481 CANDIDA DNA AMP PROBE: CPT | Mod: 59 | Performed by: OBSTETRICS & GYNECOLOGY

## 2022-08-15 PROCEDURE — 3008F BODY MASS INDEX DOCD: CPT | Mod: CPTII,,, | Performed by: OBSTETRICS & GYNECOLOGY

## 2022-08-15 PROCEDURE — 99395 PR PREVENTIVE VISIT,EST,18-39: ICD-10-PCS | Mod: S$PBB,,, | Performed by: OBSTETRICS & GYNECOLOGY

## 2022-08-15 NOTE — PROGRESS NOTES
Past medical, surgical, social, family, and obstetric histories; medications; prior records and results; and available outside records were reviewed and updated in the EMR.  Pertinent findings were noted below.    Reason for Visit   Lee Shelton    HPI   22 y.o. female  presents for well woman exam.     Her periods are regular and occur every 28 days. She reports moderate cramping before and during that require use of advil. Her bleeding is moderate.     Patient desires contraception to prevent unintended pregnancy.     New patient: No    Menopausal: No    History of abnormal paps: DENIES  Abnormal or postmenopausal bleeding: DENIES  History of abnormal mammograms:DENIES   Family history of breast or ovarian cancer: DENIES  Any breast masses, pain, skin changes, or nipple discharge: DENIES  Possible recent STD exposure: yes  Contraception: None      Pap: 2021, NILM  Mammogram: N/A  Allergies: Patient has no known allergies.    Review of Systems   Constitutional: Negative for chills and fever.   HENT: Negative for nasal congestion.    Eyes: Negative for visual disturbance.   Respiratory: Negative for shortness of breath.    Cardiovascular: Negative for chest pain.   Gastrointestinal: Negative for abdominal pain, nausea and vomiting.   Genitourinary: Negative for dysuria and vaginal bleeding.   Musculoskeletal: Negative for back pain.   Neurological: Negative for headaches.   Psychiatric/Behavioral: The patient is not nervous/anxious.        Exam   Ht 5' (1.524 m)   Wt 45.6 kg (100 lb 8.5 oz)   LMP 08/15/2022   BMI 19.63 kg/m²     Physical Exam  Constitutional:       General: She is not in acute distress.     Appearance: She is well-developed.   Genitourinary:      Bladder and urethral meatus normal.      Right Labia: No rash, lesions or Bartholin's cyst.     Left Labia: No lesions, Bartholin's cyst or rash.     No vaginal discharge or bleeding.        Right Adnexa: not tender and not full.     Left  Adnexa: not tender and not full.     No cervical motion tenderness or lesion.      Uterus is not enlarged or tender.      No urethral tenderness present.   Breasts: Breasts are symmetrical.      Right: No mass, nipple discharge, skin change, tenderness or axillary adenopathy.      Left: No mass, nipple discharge, skin change, tenderness or axillary adenopathy.       Neck:      Thyroid: No thyroid mass.   Cardiovascular:      Rate and Rhythm: Normal rate.   Pulmonary:      Effort: Pulmonary effort is normal. No respiratory distress.   Abdominal:      General: There is no distension.      Palpations: There is no hepatomegaly, splenomegaly or mass.      Tenderness: There is no abdominal tenderness.      Hernia: No hernia is present.   Musculoskeletal:      Cervical back: Normal range of motion.      Right lower leg: No edema.      Left lower leg: No edema.   Lymphadenopathy:      Cervical: No cervical adenopathy.      Upper Body:      Right upper body: No axillary adenopathy.      Left upper body: No axillary adenopathy.   Neurological:      Mental Status: She is alert and oriented to person, place, and time.   Skin:     Findings: No rash.   Psychiatric:         Mood and Affect: Mood and affect normal.   Vitals reviewed. Exam conducted with a chaperone present.       Assessment and Plan   Encounter for initial prescription of intrauterine contraceptive device (IUD)  -     Device Authorization Order    Screen for STD (sexually transmitted disease)  -     C. trachomatis/N. gonorrhoeae by AMP DNA [facility]; Cervix  -     VAGINOSIS SCREEN BY DNA PROBE       Annual exam  o Breast and pelvic exam: wnl  o Patient was counseled on ASCCP guidelines for cervical cytology screening  o Cervical screening: not performed today, last pap 2021  o Patient was counseled on current recommendations for breast cancer screening  o Mammogram screening: N/A   STD testing: swabs taken today, patient declined blood tests   Contraception:  desires IUD   Patient counseled on different forms of progesterone-only contraception due to history of migraines with aura. Risks, benefits, and alternatives discussed. Patient elects for IUD at this time. Authorization sent.     She was counseled to follow up with her PCP for other routine health maintenance    Chika Prescott MD  PGY-1  Obstetrics & Gynecology

## 2022-08-16 LAB
C TRACH DNA SPEC QL NAA+PROBE: NOT DETECTED
N GONORRHOEA DNA SPEC QL NAA+PROBE: NOT DETECTED

## 2022-09-21 ENCOUNTER — OFFICE VISIT (OUTPATIENT)
Dept: URGENT CARE | Facility: CLINIC | Age: 22
End: 2022-09-21
Payer: MEDICAID

## 2022-09-21 VITALS
BODY MASS INDEX: 19.63 KG/M2 | HEART RATE: 94 BPM | RESPIRATION RATE: 18 BRPM | SYSTOLIC BLOOD PRESSURE: 122 MMHG | WEIGHT: 100 LBS | TEMPERATURE: 98 F | OXYGEN SATURATION: 98 % | DIASTOLIC BLOOD PRESSURE: 84 MMHG | HEIGHT: 60 IN

## 2022-09-21 DIAGNOSIS — G43.909 MIGRAINE WITHOUT STATUS MIGRAINOSUS, NOT INTRACTABLE, UNSPECIFIED MIGRAINE TYPE: Primary | ICD-10-CM

## 2022-09-21 LAB
CTP QC/QA: YES
SARS-COV-2 RDRP RESP QL NAA+PROBE: NEGATIVE

## 2022-09-21 PROCEDURE — U0002 COVID-19 LAB TEST NON-CDC: HCPCS | Mod: QW,S$GLB,, | Performed by: NURSE PRACTITIONER

## 2022-09-21 PROCEDURE — 3074F PR MOST RECENT SYSTOLIC BLOOD PRESSURE < 130 MM HG: ICD-10-PCS | Mod: CPTII,S$GLB,, | Performed by: NURSE PRACTITIONER

## 2022-09-21 PROCEDURE — 3079F PR MOST RECENT DIASTOLIC BLOOD PRESSURE 80-89 MM HG: ICD-10-PCS | Mod: CPTII,S$GLB,, | Performed by: NURSE PRACTITIONER

## 2022-09-21 PROCEDURE — 3074F SYST BP LT 130 MM HG: CPT | Mod: CPTII,S$GLB,, | Performed by: NURSE PRACTITIONER

## 2022-09-21 PROCEDURE — 1159F PR MEDICATION LIST DOCUMENTED IN MEDICAL RECORD: ICD-10-PCS | Mod: CPTII,S$GLB,, | Performed by: NURSE PRACTITIONER

## 2022-09-21 PROCEDURE — 3079F DIAST BP 80-89 MM HG: CPT | Mod: CPTII,S$GLB,, | Performed by: NURSE PRACTITIONER

## 2022-09-21 PROCEDURE — 3008F PR BODY MASS INDEX (BMI) DOCUMENTED: ICD-10-PCS | Mod: CPTII,S$GLB,, | Performed by: NURSE PRACTITIONER

## 2022-09-21 PROCEDURE — 3008F BODY MASS INDEX DOCD: CPT | Mod: CPTII,S$GLB,, | Performed by: NURSE PRACTITIONER

## 2022-09-21 PROCEDURE — 99213 PR OFFICE/OUTPT VISIT, EST, LEVL III, 20-29 MIN: ICD-10-PCS | Mod: S$GLB,,, | Performed by: NURSE PRACTITIONER

## 2022-09-21 PROCEDURE — 1159F MED LIST DOCD IN RCRD: CPT | Mod: CPTII,S$GLB,, | Performed by: NURSE PRACTITIONER

## 2022-09-21 PROCEDURE — U0002: ICD-10-PCS | Mod: QW,S$GLB,, | Performed by: NURSE PRACTITIONER

## 2022-09-21 PROCEDURE — 99213 OFFICE O/P EST LOW 20 MIN: CPT | Mod: S$GLB,,, | Performed by: NURSE PRACTITIONER

## 2022-09-21 RX ORDER — BUTALBITAL, ACETAMINOPHEN AND CAFFEINE 50; 325; 40 MG/1; MG/1; MG/1
1 TABLET ORAL
Qty: 12 TABLET | Refills: 0 | Status: SHIPPED | OUTPATIENT
Start: 2022-09-21 | End: 2023-05-17

## 2022-09-21 RX ORDER — ONDANSETRON 4 MG/1
4 TABLET, FILM COATED ORAL EVERY 6 HOURS PRN
Qty: 18 TABLET | Refills: 0 | Status: SHIPPED | OUTPATIENT
Start: 2022-09-21 | End: 2023-05-17

## 2022-09-21 RX ORDER — KETOROLAC TROMETHAMINE 30 MG/ML
30 INJECTION, SOLUTION INTRAMUSCULAR; INTRAVENOUS
Status: COMPLETED | OUTPATIENT
Start: 2022-09-21 | End: 2022-09-21

## 2022-09-21 RX ADMIN — KETOROLAC TROMETHAMINE 30 MG: 30 INJECTION, SOLUTION INTRAMUSCULAR; INTRAVENOUS at 06:09

## 2022-09-21 NOTE — PROGRESS NOTES
Subjective:       Patient ID: Danette Ang is a 22 y.o. female.    Vitals:  height is 5' (1.524 m) and weight is 45.4 kg (100 lb). Her oral temperature is 98.2 °F (36.8 °C). Her blood pressure is 122/84 and her pulse is 94. Her respiration is 18 and oxygen saturation is 98%.     Chief Complaint: Headache    22 yr old female presents to the Urgent Care with complaint of headache x 4 days on and off. Patient has hx of migraines.    Migraine   This is a new problem. The current episode started in the past 7 days. The problem occurs intermittently. The problem has been unchanged. The pain is located in the Frontal region. The pain does not radiate. The pain quality is similar to prior headaches. The quality of the pain is described as aching and throbbing. The pain is at a severity of 7/10. The pain is moderate. Associated symptoms include dizziness and photophobia. Pertinent negatives include no abdominal pain, abnormal behavior, anorexia, back pain, blurred vision, coughing, drainage, ear pain, eye pain, eye redness, eye watering, facial sweating, fever, hearing loss, insomnia, loss of balance, muscle aches, nausea, neck pain, numbness, phonophobia, rhinorrhea, scalp tenderness, seizures, sinus pressure, sore throat, swollen glands, tingling, tinnitus, visual change, vomiting, weakness or weight loss. Nothing aggravates the symptoms. She has tried nothing for the symptoms. The treatment provided no relief. Her past medical history is significant for migraine headaches.     Constitution: Negative for fever.   HENT:  Negative for ear pain, tinnitus, hearing loss, sinus pressure and sore throat.    Neck: Negative for neck pain.   Eyes:  Positive for photophobia. Negative for eye pain, eye redness and blurred vision.   Respiratory:  Negative for cough.    Gastrointestinal:  Negative for abdominal pain, nausea and vomiting.   Musculoskeletal:  Negative for back pain.   Neurological:  Positive for dizziness and history of  migraines. Negative for loss of balance, numbness and seizures.   Psychiatric/Behavioral:  The patient does not have insomnia.      Objective:      Physical Exam   Constitutional: She is oriented to person, place, and time. She appears well-developed. She is cooperative.  Non-toxic appearance. She does not appear ill. No distress.   HENT:   Head: Normocephalic and atraumatic.   Ears:   Right Ear: Hearing, tympanic membrane, external ear and ear canal normal.   Left Ear: Hearing, tympanic membrane, external ear and ear canal normal.   Nose: Nose normal. No mucosal edema, rhinorrhea or nasal deformity. No epistaxis. Right sinus exhibits no maxillary sinus tenderness and no frontal sinus tenderness. Left sinus exhibits no maxillary sinus tenderness and no frontal sinus tenderness.   Mouth/Throat: Uvula is midline, oropharynx is clear and moist and mucous membranes are normal. No trismus in the jaw. Normal dentition. No uvula swelling. No posterior oropharyngeal erythema.   Eyes: Conjunctivae, EOM and lids are normal. Pupils are equal, round, and reactive to light. No scleral icterus.   Neck: Trachea normal and phonation normal. Neck supple. No neck rigidity present.   Cardiovascular: Normal rate, regular rhythm, normal heart sounds and normal pulses.   Pulmonary/Chest: Effort normal and breath sounds normal. No respiratory distress.   Abdominal: Normal appearance and bowel sounds are normal. She exhibits no distension. Soft. There is no abdominal tenderness.   Musculoskeletal: Normal range of motion.         General: No deformity. Normal range of motion.   Neurological: no focal deficit. She is alert, oriented to person, place, and time and at baseline. She has normal motor skills and normal sensation. No cranial nerve deficit. She exhibits normal muscle tone. Gait and coordination normal. Coordination normal.   Skin: Skin is warm, dry, intact, not diaphoretic and not pale.   Psychiatric: Her speech is normal and  behavior is normal. Judgment and thought content normal.   Nursing note and vitals reviewed.      Assessment:       1. Migraine without status migrainosus, not intractable, unspecified migraine type         Results for orders placed or performed in visit on 09/21/22   POCT COVID-19 Rapid Screening   Result Value Ref Range    POC Rapid COVID Negative Negative     Acceptable Yes        Plan:       This is an urgent evaluation of a 22 y.o. female with a PMHx of recurrent headaches presenting to the  for gradual onset of headache similar to past headaches per patient. Denies traumatic injury, LOC, emesis, and seizure. Also denies fever, neck rigidity, sinus pain, dental pain, and otalgia. Vitals reasssuring. Neurologically intact without focal deficits.      Given trial of Toradol in UC. Remains well appearing. Vitals stable.     Presentation most consistent with acute migraine vs tension HA in this patient with Hx of recurrent HAs. No clinical Hx or findings to suggest intracranial hemorrhage or warrant emergent imaging at this time. Not consistent with infectious etiology, including for meningitis, acute bacterial sinusitis, dental abscess, AOM, and mastoiditis.    Discharged home with Fioricet and Zofran.     I discussed with the patient the diagnosis, treatment plan, indications for the emergency department, and for expected follow-up. The patient verbalized an understanding. The patient is asked if there are any questions or concerns. We discuss the case, until all issues are addressed to the patients satisfaction. Patient understands and is agreeable to the plan.     Migraine without status migrainosus, not intractable, unspecified migraine type  -     POCT COVID-19 Rapid Screening  -     ketorolac injection 30 mg  -     butalbital-acetaminophen-caffeine -40 mg (FIORICET, ESGIC) -40 mg per tablet; Take 1 tablet by mouth every 4 to 6 hours as needed for Pain.  Dispense: 12 tablet;  Refill: 0  -     ondansetron (ZOFRAN) 4 MG tablet; Take 1 tablet (4 mg total) by mouth every 6 (six) hours as needed for Nausea.  Dispense: 18 tablet; Refill: 0       Patient Instructions   If you were prescribed a narcotic or controlled medication, do not drive or operate heavy equipment or machinery while taking these medications.  You must understand that you've received an Urgent Care treatment only and that you may be released before all your medical problems are known or treated. You, the patient, will arrange for follow up care as instructed.  Follow up with your PCP or specialty clinic as directed within 2-5 days if not improved or as needed.  You can call [phone] to schedule an appointment with the appropriate provider.  If your condition worsens we recommend that you receive another evaluation at the emergency room immediately or contact your primary medical clinics after hours call service to discuss your concerns.  Please return here or go to the Emergency Department for any concerns or worsening of condition.

## 2022-09-21 NOTE — PATIENT INSTRUCTIONS

## 2022-09-29 ENCOUNTER — OFFICE VISIT (OUTPATIENT)
Dept: URGENT CARE | Facility: CLINIC | Age: 22
End: 2022-09-29
Payer: MEDICAID

## 2022-09-29 VITALS
RESPIRATION RATE: 19 BRPM | SYSTOLIC BLOOD PRESSURE: 122 MMHG | OXYGEN SATURATION: 97 % | HEIGHT: 60 IN | WEIGHT: 107 LBS | BODY MASS INDEX: 21.01 KG/M2 | DIASTOLIC BLOOD PRESSURE: 88 MMHG | TEMPERATURE: 98 F | HEART RATE: 104 BPM

## 2022-09-29 DIAGNOSIS — R05.9 COUGH: ICD-10-CM

## 2022-09-29 DIAGNOSIS — B34.9 VIRAL SYNDROME: Primary | ICD-10-CM

## 2022-09-29 DIAGNOSIS — J02.9 SORE THROAT: ICD-10-CM

## 2022-09-29 LAB
CTP QC/QA: YES
CTP QC/QA: YES
POC MOLECULAR INFLUENZA A AGN: NEGATIVE
POC MOLECULAR INFLUENZA B AGN: NEGATIVE
SARS-COV-2 RDRP RESP QL NAA+PROBE: NEGATIVE

## 2022-09-29 PROCEDURE — 99213 OFFICE O/P EST LOW 20 MIN: CPT | Mod: S$GLB,,,

## 2022-09-29 PROCEDURE — 3008F PR BODY MASS INDEX (BMI) DOCUMENTED: ICD-10-PCS | Mod: CPTII,S$GLB,,

## 2022-09-29 PROCEDURE — 3074F SYST BP LT 130 MM HG: CPT | Mod: CPTII,S$GLB,,

## 2022-09-29 PROCEDURE — U0002 COVID-19 LAB TEST NON-CDC: HCPCS | Mod: QW,S$GLB,,

## 2022-09-29 PROCEDURE — 1159F MED LIST DOCD IN RCRD: CPT | Mod: CPTII,S$GLB,,

## 2022-09-29 PROCEDURE — U0002: ICD-10-PCS | Mod: QW,S$GLB,,

## 2022-09-29 PROCEDURE — 3079F PR MOST RECENT DIASTOLIC BLOOD PRESSURE 80-89 MM HG: ICD-10-PCS | Mod: CPTII,S$GLB,,

## 2022-09-29 PROCEDURE — 3074F PR MOST RECENT SYSTOLIC BLOOD PRESSURE < 130 MM HG: ICD-10-PCS | Mod: CPTII,S$GLB,,

## 2022-09-29 PROCEDURE — 1159F PR MEDICATION LIST DOCUMENTED IN MEDICAL RECORD: ICD-10-PCS | Mod: CPTII,S$GLB,,

## 2022-09-29 PROCEDURE — 87502 POCT INFLUENZA A/B MOLECULAR: ICD-10-PCS | Mod: QW,S$GLB,,

## 2022-09-29 PROCEDURE — 99213 PR OFFICE/OUTPT VISIT, EST, LEVL III, 20-29 MIN: ICD-10-PCS | Mod: S$GLB,,,

## 2022-09-29 PROCEDURE — 3079F DIAST BP 80-89 MM HG: CPT | Mod: CPTII,S$GLB,,

## 2022-09-29 PROCEDURE — 1160F PR REVIEW ALL MEDS BY PRESCRIBER/CLIN PHARMACIST DOCUMENTED: ICD-10-PCS | Mod: CPTII,S$GLB,,

## 2022-09-29 PROCEDURE — 1160F RVW MEDS BY RX/DR IN RCRD: CPT | Mod: CPTII,S$GLB,,

## 2022-09-29 PROCEDURE — 87502 INFLUENZA DNA AMP PROBE: CPT | Mod: QW,S$GLB,,

## 2022-09-29 PROCEDURE — 3008F BODY MASS INDEX DOCD: CPT | Mod: CPTII,S$GLB,,

## 2022-09-29 RX ORDER — BENZONATATE 200 MG/1
200 CAPSULE ORAL 3 TIMES DAILY PRN
Qty: 30 CAPSULE | Refills: 0 | Status: SHIPPED | OUTPATIENT
Start: 2022-09-29 | End: 2023-05-17

## 2022-09-29 RX ORDER — FLUTICASONE PROPIONATE 50 MCG
1 SPRAY, SUSPENSION (ML) NASAL DAILY
Qty: 9.9 ML | Refills: 0 | Status: SHIPPED | OUTPATIENT
Start: 2022-09-29

## 2022-09-29 NOTE — PATIENT INSTRUCTIONS
You must understand that you have received Urgent Care treatment only and that you may be released before all of your medical problems are known or treated.   Return to Urgent Care or go to ER if symptoms worsen or fail to improve.  Follow up with PCP as recommended for further management.   We will notify you of any test results (if applicable) in 3-5 days.    Please drink plenty of fluids.  Please get plenty of rest.  Please return here or go to the Emergency Department for any concerns or worsening of condition.  If you were prescribed antibiotics, please take them to completion.  If you were given wait & see antibiotics, please wait 5-7 days before taking them, and only take them if your symptoms have worsened or not improved.  If you do begin taking the antibiotics, please take them to completion.  If you were prescribed a narcotic medication, do not drive or operate heavy equipment or machinery while taking these medications.  If you were given a steroid shot in the clinic and have also been given a prescription for a steroid such as Prednisone or a Medrol Dose Pack, please begin taking them tomorrow.  If you do not have Hypertension or any history of palpitations, it is ok to take over the counter Sudafed or Mucinex D or Allegra-D or Claritin-D or Zyrtec-D.  If you do take one of the above, it is ok to combine that with plain over the counter Mucinex or Allegra or Claritin or Zyrtec.  If for example you are taking Zyrtec -D, you can combine that with Mucinex, but not Mucinex-D.  If you are taking Mucinex-D, you can combine that with plain Allegra or Claritin or Zyrtec.   If you do have Hypertension or palpitations, it is safe to take Coricidin HBP for relief of sinus symptoms.  If not allergic, please take over the counter Tylenol (Acetaminophen) and/or Motrin (Ibuprofen) as directed for control of pain and/or fever.  Please follow up with your primary care doctor or specialist as needed.    If you  smoke,  please stop smoking.

## 2022-09-29 NOTE — PROGRESS NOTES
Subjective:       Patient ID: Danette Ang is a 22 y.o. female.    Vitals:  height is 5' (1.524 m) and weight is 48.5 kg (107 lb). Her oral temperature is 98.1 °F (36.7 °C). Her blood pressure is 122/88 and her pulse is 104. Her respiration is 19 and oxygen saturation is 97%.     Chief Complaint: COVID-19 Concerns (Entered by patient), Cough, Headache, Sinus Problem, Shortness of Breath, and Nasal Congestion (Pt stated she has also lost her sense of taste. )    Danette Ang is a 22 y.o. female who presents for URI sxs which onset 2 days ago. Associated sxs include sore throat, congestion,  HA, SOB, and productive cough. Patient denies any fever, chills, CP, abd pain, n/v/d, rash, dizziness, or numbness/tingling. She is vaccinated. Prior Tx includes a number of OTC meds with no relief.    Constitution: Negative for chills and fever.   HENT:  Positive for congestion and sore throat. Negative for ear pain and ear discharge.    Cardiovascular:  Negative for chest pain.   Respiratory:  Positive for cough, sputum production and shortness of breath.    Gastrointestinal:  Positive for diarrhea. Negative for abdominal pain, nausea and vomiting.   Neurological:  Positive for headaches. Negative for dizziness, numbness and tingling.     Objective:      Physical Exam   Constitutional: She is oriented to person, place, and time. She appears well-developed. She is cooperative.  Non-toxic appearance. She does not appear ill. No distress.   HENT:   Head: Normocephalic and atraumatic.   Ears:   Right Ear: Hearing, tympanic membrane, external ear and ear canal normal.   Left Ear: Hearing, tympanic membrane, external ear and ear canal normal.   Nose: No mucosal edema, rhinorrhea or nasal deformity. No epistaxis. Right sinus exhibits maxillary sinus tenderness. Right sinus exhibits no frontal sinus tenderness. Left sinus exhibits no maxillary sinus tenderness and no frontal sinus tenderness.   Mouth/Throat: Uvula is midline and  mucous membranes are normal. Mucous membranes are moist. No trismus in the jaw. Normal dentition. No uvula swelling. Posterior oropharyngeal erythema present. No oropharyngeal exudate or posterior oropharyngeal edema.   Eyes: Conjunctivae and lids are normal. No scleral icterus.   Neck: Trachea normal and phonation normal. Neck supple. No edema present. No erythema present. No neck rigidity present.   Cardiovascular: Normal rate, regular rhythm, normal heart sounds and normal pulses.   Pulmonary/Chest: Effort normal and breath sounds normal. No respiratory distress. She has no decreased breath sounds. She has no rhonchi.   Abdominal: Normal appearance. She exhibits no distension and no mass. Soft. flat abdomen There is no abdominal tenderness. There is no rebound and no guarding. No hernia.   Musculoskeletal: Normal range of motion.         General: No deformity. Normal range of motion.   Neurological: She is alert and oriented to person, place, and time. She exhibits normal muscle tone. Coordination normal.   Skin: Skin is warm, dry, intact, not diaphoretic and not pale.   Psychiatric: Her speech is normal and behavior is normal. Judgment and thought content normal.   Nursing note and vitals reviewed.      Assessment:       1. Viral syndrome    2. Sore throat    3. Cough        Results for orders placed or performed in visit on 09/29/22   POCT COVID-19 Rapid Screening   Result Value Ref Range    POC Rapid COVID Negative Negative     Acceptable Yes    POCT Influenza A/B MOLECULAR   Result Value Ref Range    POC Molecular Influenza A Ag Negative Negative, Not Reported    POC Molecular Influenza B Ag Negative Negative, Not Reported     Acceptable Yes      Plan:         Viral syndrome  -     fluticasone propionate (FLONASE) 50 mcg/actuation nasal spray; 1 spray (50 mcg total) by Each Nostril route once daily.  Dispense: 9.9 mL; Refill: 0    Sore throat  -     (Magic mouthwash) 1:1:1  diphenhydramine(Benadryl) 12.5mg/5ml liq, aluminum & magnesium hydroxide-simethicone (Maalox), LIDOcaine viscous 2%; Swish and spit 5 mLs every 4 (four) hours as needed (as needed for throat discomfort). for mouth sores  Dispense: 90 mL; Refill: 0    Cough  -     POCT COVID-19 Rapid Screening  -     POCT Influenza A/B MOLECULAR  -     benzonatate (TESSALON) 200 MG capsule; Take 1 capsule (200 mg total) by mouth 3 (three) times daily as needed for Cough.  Dispense: 30 capsule; Refill: 0    Recommend OTC Tylenol or Motrin for any pain/discomfort.  Salt gargles and/or warm water with tea for throat discomfort.  OTC Mucinex for decongestion.    Discussed with patient all pertinent information and results. Discussed patient diagnosis and plan of treatment. Patient was given all f/u and return instructions. All questions and concerns were addressed at this time. Patient expresses understanding of information and instructions, and is comfortable with plan.    Patient was instructed to return to clinic or go to ED immediately for any worsening or change in current symptoms.    Patient Instructions   You must understand that you have received Urgent Care treatment only and that you may be released before all of your medical problems are known or treated.   Return to Urgent Care or go to ER if symptoms worsen or fail to improve.  Follow up with PCP as recommended for further management.   We will notify you of any test results (if applicable) in 3-5 days.    Please drink plenty of fluids.  Please get plenty of rest.  Please return here or go to the Emergency Department for any concerns or worsening of condition.  If you were prescribed antibiotics, please take them to completion.  If you were given wait & see antibiotics, please wait 5-7 days before taking them, and only take them if your symptoms have worsened or not improved.  If you do begin taking the antibiotics, please take them to completion.  If you were prescribed a  narcotic medication, do not drive or operate heavy equipment or machinery while taking these medications.  If you were given a steroid shot in the clinic and have also been given a prescription for a steroid such as Prednisone or a Medrol Dose Pack, please begin taking them tomorrow.  If you do not have Hypertension or any history of palpitations, it is ok to take over the counter Sudafed or Mucinex D or Allegra-D or Claritin-D or Zyrtec-D.  If you do take one of the above, it is ok to combine that with plain over the counter Mucinex or Allegra or Claritin or Zyrtec.  If for example you are taking Zyrtec -D, you can combine that with Mucinex, but not Mucinex-D.  If you are taking Mucinex-D, you can combine that with plain Allegra or Claritin or Zyrtec.   If you do have Hypertension or palpitations, it is safe to take Coricidin HBP for relief of sinus symptoms.  If not allergic, please take over the counter Tylenol (Acetaminophen) and/or Motrin (Ibuprofen) as directed for control of pain and/or fever.  Please follow up with your primary care doctor or specialist as needed.    If you  smoke, please stop smoking.

## 2022-09-29 NOTE — LETTER
September 29, 2022      [facility]  [address] [address]  Phone: [phone]  Fax: [phone]       Patient: Danette Ang   YOB: 2000  Date of Visit: 09/29/2022    To Whom It May Concern:    Sarah Ang  was at [facility] on 09/29/2022. The patient may return to work/school on 10/3/2022 with no restrictions. If you have any questions or concerns, or if I can be of further assistance, please do not hesitate to contact me.    Sincerely,      Luz Matias PA-C

## 2022-10-10 ENCOUNTER — PATIENT MESSAGE (OUTPATIENT)
Dept: OBSTETRICS AND GYNECOLOGY | Facility: CLINIC | Age: 22
End: 2022-10-10
Payer: MEDICAID

## 2022-10-10 DIAGNOSIS — N94.6 DYSMENORRHEA: ICD-10-CM

## 2022-10-10 RX ORDER — IBUPROFEN 800 MG/1
800 TABLET ORAL EVERY 8 HOURS PRN
Qty: 20 TABLET | Refills: 2 | Status: SHIPPED | OUTPATIENT
Start: 2022-10-10 | End: 2023-05-03 | Stop reason: SDUPTHER

## 2023-01-03 ENCOUNTER — OFFICE VISIT (OUTPATIENT)
Dept: OBSTETRICS AND GYNECOLOGY | Facility: CLINIC | Age: 23
End: 2023-01-03
Payer: MEDICAID

## 2023-01-03 VITALS
SYSTOLIC BLOOD PRESSURE: 108 MMHG | DIASTOLIC BLOOD PRESSURE: 80 MMHG | WEIGHT: 101.44 LBS | BODY MASS INDEX: 19.91 KG/M2 | HEIGHT: 60 IN

## 2023-01-03 DIAGNOSIS — Z30.09 ENCOUNTER FOR OTHER GENERAL COUNSELING OR ADVICE ON CONTRACEPTION: Primary | ICD-10-CM

## 2023-01-03 DIAGNOSIS — Z11.3 SCREENING FOR STD (SEXUALLY TRANSMITTED DISEASE): ICD-10-CM

## 2023-01-03 DIAGNOSIS — Z01.419 WELL WOMAN EXAM WITH ROUTINE GYNECOLOGICAL EXAM: ICD-10-CM

## 2023-01-03 LAB
C TRACH DNA SPEC QL NAA+PROBE: NOT DETECTED
N GONORRHOEA DNA SPEC QL NAA+PROBE: NOT DETECTED

## 2023-01-03 PROCEDURE — 87491 CHLMYD TRACH DNA AMP PROBE: CPT | Performed by: OBSTETRICS & GYNECOLOGY

## 2023-01-03 PROCEDURE — 99213 OFFICE O/P EST LOW 20 MIN: CPT | Mod: PBBFAC,PN | Performed by: OBSTETRICS & GYNECOLOGY

## 2023-01-03 PROCEDURE — 3074F PR MOST RECENT SYSTOLIC BLOOD PRESSURE < 130 MM HG: ICD-10-PCS | Mod: CPTII,,, | Performed by: OBSTETRICS & GYNECOLOGY

## 2023-01-03 PROCEDURE — 99395 PR PREVENTIVE VISIT,EST,18-39: ICD-10-PCS | Mod: S$PBB,,, | Performed by: OBSTETRICS & GYNECOLOGY

## 2023-01-03 PROCEDURE — 3008F BODY MASS INDEX DOCD: CPT | Mod: CPTII,,, | Performed by: OBSTETRICS & GYNECOLOGY

## 2023-01-03 PROCEDURE — 1159F PR MEDICATION LIST DOCUMENTED IN MEDICAL RECORD: ICD-10-PCS | Mod: CPTII,,, | Performed by: OBSTETRICS & GYNECOLOGY

## 2023-01-03 PROCEDURE — 3079F PR MOST RECENT DIASTOLIC BLOOD PRESSURE 80-89 MM HG: ICD-10-PCS | Mod: CPTII,,, | Performed by: OBSTETRICS & GYNECOLOGY

## 2023-01-03 PROCEDURE — 1159F MED LIST DOCD IN RCRD: CPT | Mod: CPTII,,, | Performed by: OBSTETRICS & GYNECOLOGY

## 2023-01-03 PROCEDURE — 87591 N.GONORRHOEAE DNA AMP PROB: CPT | Performed by: OBSTETRICS & GYNECOLOGY

## 2023-01-03 PROCEDURE — 99999 PR PBB SHADOW E&M-EST. PATIENT-LVL III: ICD-10-PCS | Mod: PBBFAC,,, | Performed by: OBSTETRICS & GYNECOLOGY

## 2023-01-03 PROCEDURE — 3079F DIAST BP 80-89 MM HG: CPT | Mod: CPTII,,, | Performed by: OBSTETRICS & GYNECOLOGY

## 2023-01-03 PROCEDURE — 81514 NFCT DS BV&VAGINITIS DNA ALG: CPT | Performed by: OBSTETRICS & GYNECOLOGY

## 2023-01-03 PROCEDURE — 99395 PREV VISIT EST AGE 18-39: CPT | Mod: S$PBB,,, | Performed by: OBSTETRICS & GYNECOLOGY

## 2023-01-03 PROCEDURE — 3074F SYST BP LT 130 MM HG: CPT | Mod: CPTII,,, | Performed by: OBSTETRICS & GYNECOLOGY

## 2023-01-03 PROCEDURE — 99999 PR PBB SHADOW E&M-EST. PATIENT-LVL III: CPT | Mod: PBBFAC,,, | Performed by: OBSTETRICS & GYNECOLOGY

## 2023-01-03 PROCEDURE — 3008F PR BODY MASS INDEX (BMI) DOCUMENTED: ICD-10-PCS | Mod: CPTII,,, | Performed by: OBSTETRICS & GYNECOLOGY

## 2023-01-03 RX ORDER — ACETAMINOPHEN AND CODEINE PHOSPHATE 120; 12 MG/5ML; MG/5ML
1 SOLUTION ORAL DAILY
Qty: 28 TABLET | Refills: 12 | Status: SHIPPED | OUTPATIENT
Start: 2023-01-03 | End: 2024-01-01 | Stop reason: SDUPTHER

## 2023-01-03 NOTE — PATIENT INSTRUCTIONS
Make sure to take your pill every day at the SAME TIME. If you miss a pill, take it as soon as your remember. If you've missed your pill by 3 hours, you can be susceptible to pregnancy for the next 48 hours so be sure to refrain from intercourse or use condoms.

## 2023-01-03 NOTE — PROGRESS NOTES
Past medical, surgical, social, family, and obstetric histories; medications; prior records and results; and available outside records were reviewed and updated in the EMR.  Pertinent findings were noted below.    Reason for Visit   Well Woman    HPI   22 y.o. female  in [employer] (has 7 months left), working as nurse tech at [employer]    New patient: No    Menopausal: No    History of abnormal paps: DENIES  Abnormal or postmenopausal bleeding: DENIES  History of abnormal mammograms:N/A   Family history of breast or ovarian cancer: DENIES  Any breast masses, pain, skin changes, or nipple discharge: DENIES  Possible recent STD exposure: desires testing  Contraception: Condoms (sometimes) and coitus interruptus    She was taking combined hormonal contraceptive pills but it caused vaginal dryness during intercourse, therefore she discontinued    Pap: 2021, NILM  Mammogram: N/A  Allergies: Patient has no known allergies.    Review of Systems   Constitutional:  Negative for chills and fever.   Eyes:  Negative for visual disturbance.   Respiratory:  Negative for cough and shortness of breath.    Cardiovascular:  Negative for chest pain and leg swelling.   Gastrointestinal:  Negative for abdominal pain, nausea and vomiting.   Genitourinary:  Negative for dysuria, flank pain, frequency, urgency and vaginal bleeding.   Integumentary:  Negative for breast mass.   Neurological:  Negative for syncope and headaches.   Psychiatric/Behavioral:  Negative for depression. The patient is not nervous/anxious.    All other systems reviewed and are negative.  Breast: Negative for mass and mastodynia    Exam   /80   Ht 5' (1.524 m)   Wt 46 kg (101 lb 6.6 oz)   BMI 19.81 kg/m²     Physical Exam  Constitutional:       General: She is not in acute distress.     Appearance: She is not toxic-appearing.   Genitourinary:      Vulva normal.      Right Labia: No rash, lesions or Bartholin's cyst.     Left Labia: No  lesions, Bartholin's cyst or rash.     Vaginal bleeding (on day #2 of cycle) present.      No vaginal discharge.        Right Adnexa: not tender and not full.     Left Adnexa: not tender and not full.     No cervical motion tenderness, discharge or lesion.      Uterus is not enlarged or tender.      Pelvic exam was performed with patient in the lithotomy position.   Breasts:     Breasts are symmetrical.      Right: No mass, nipple discharge, skin change or tenderness.      Left: No mass, nipple discharge, skin change or tenderness.   Pulmonary:      Effort: No respiratory distress.   Abdominal:      General: There is no distension.      Palpations: Abdomen is soft. There is no mass.      Tenderness: There is no abdominal tenderness. There is no guarding or rebound.   Lymphadenopathy:      Upper Body:      Right upper body: No axillary adenopathy.      Left upper body: No axillary adenopathy.   Exam conducted with a chaperone present.     Assessment and Plan   Encounter for other general counseling or advice on contraception  -     norethindrone (MICRONOR) 0.35 mg tablet; Take 1 tablet (0.35 mg total) by mouth once daily.  Dispense: 28 tablet; Refill: 12      Annual exam  Breast and pelvic exam: performed today, WNL  Patient was counseled on ASCCP guidelines for cervical cytology screening  Cervical screening: UTD, next due 2024  Patient was counseled on current recommendations for breast cancer screening  Mammogram screening: @ 39yo unless new risk factors warrant earlier screening  VitD/Ca supplementation recommendations based on age:  <50yoa - Ca 1000mg/day, VitD 600IU/day  51-70yoa - Ca 1200mg/day, VitD 600IU/day  >71yoa - Ca 1200mg/day, VitD 800IU/day  STD testing: desires swabs only - sent  Contraception: vaginal dryness due to COCs, all other optoins discussed, patient only desires pills, dicussed POP only option that likely wouldn't cause dryness but would be very sensitive to timing. Patient desires to try  POPs - instructions given. One pill per day around the same time everyday. If you miss a pill by 3 hours you aren't protected from pregnancy for the next 2 days so abstain from intercourse or use condoms.    She was counseled to follow up with her PCP for other routine health maintenance

## 2023-01-04 DIAGNOSIS — N76.0 BV (BACTERIAL VAGINOSIS): Primary | ICD-10-CM

## 2023-01-04 DIAGNOSIS — B96.89 BV (BACTERIAL VAGINOSIS): Primary | ICD-10-CM

## 2023-01-04 RX ORDER — METRONIDAZOLE 500 MG/1
500 TABLET ORAL EVERY 12 HOURS
Qty: 14 TABLET | Refills: 0 | Status: SHIPPED | OUTPATIENT
Start: 2023-01-04 | End: 2023-01-11

## 2023-01-10 LAB — POCT GLUCOSE: 80 MG/DL (ref 70–110)

## 2023-01-10 PROCEDURE — 99284 EMERGENCY DEPT VISIT MOD MDM: CPT | Mod: ,,, | Performed by: EMERGENCY MEDICINE

## 2023-01-10 PROCEDURE — 96375 TX/PRO/DX INJ NEW DRUG ADDON: CPT

## 2023-01-10 PROCEDURE — 93005 ELECTROCARDIOGRAM TRACING: CPT

## 2023-01-10 PROCEDURE — 99285 EMERGENCY DEPT VISIT HI MDM: CPT | Mod: 25

## 2023-01-10 PROCEDURE — 96374 THER/PROPH/DIAG INJ IV PUSH: CPT

## 2023-01-10 PROCEDURE — 96361 HYDRATE IV INFUSION ADD-ON: CPT

## 2023-01-10 PROCEDURE — 93010 ELECTROCARDIOGRAM REPORT: CPT | Mod: ,,, | Performed by: INTERNAL MEDICINE

## 2023-01-10 PROCEDURE — 93010 EKG 12-LEAD: ICD-10-PCS | Mod: ,,, | Performed by: INTERNAL MEDICINE

## 2023-01-10 PROCEDURE — 99284 PR EMERGENCY DEPT VISIT,LEVEL IV: ICD-10-PCS | Mod: ,,, | Performed by: EMERGENCY MEDICINE

## 2023-01-11 ENCOUNTER — HOSPITAL ENCOUNTER (EMERGENCY)
Facility: HOSPITAL | Age: 23
Discharge: HOME | End: 2023-01-11
Attending: EMERGENCY MEDICINE
Payer: MEDICAID

## 2023-01-11 VITALS
DIASTOLIC BLOOD PRESSURE: 85 MMHG | OXYGEN SATURATION: 100 % | TEMPERATURE: 98 F | BODY MASS INDEX: 20.12 KG/M2 | HEART RATE: 108 BPM | SYSTOLIC BLOOD PRESSURE: 143 MMHG | RESPIRATION RATE: 18 BRPM | WEIGHT: 103 LBS

## 2023-01-11 DIAGNOSIS — I10 HYPERTENSION, UNSPECIFIED TYPE: ICD-10-CM

## 2023-01-11 DIAGNOSIS — R51.9 NONINTRACTABLE HEADACHE, UNSPECIFIED CHRONICITY PATTERN, UNSPECIFIED HEADACHE TYPE: ICD-10-CM

## 2023-01-11 DIAGNOSIS — R40.20 LOSS OF CONSCIOUSNESS: Primary | ICD-10-CM

## 2023-01-11 LAB
ALBUMIN SERPL BCP-MCNC: 4.6 G/DL (ref 3.5–5.2)
ALP SERPL-CCNC: 64 U/L (ref 55–135)
ALT SERPL W/O P-5'-P-CCNC: 7 U/L (ref 10–44)
ANION GAP SERPL CALC-SCNC: 9 MMOL/L (ref 8–16)
AST SERPL-CCNC: 23 U/L (ref 10–40)
B-HCG UR QL: NEGATIVE
BASOPHILS # BLD AUTO: 0.02 K/UL (ref 0–0.2)
BASOPHILS NFR BLD: 0.3 % (ref 0–1.9)
BILIRUB SERPL-MCNC: 1.2 MG/DL (ref 0.1–1)
BUN SERPL-MCNC: 8 MG/DL (ref 6–20)
CALCIUM SERPL-MCNC: 10.2 MG/DL (ref 8.7–10.5)
CHLORIDE SERPL-SCNC: 104 MMOL/L (ref 95–110)
CO2 SERPL-SCNC: 25 MMOL/L (ref 23–29)
CREAT SERPL-MCNC: 0.8 MG/DL (ref 0.5–1.4)
CTP QC/QA: YES
DIFFERENTIAL METHOD: NORMAL
EOSINOPHIL # BLD AUTO: 0 K/UL (ref 0–0.5)
EOSINOPHIL NFR BLD: 0.7 % (ref 0–8)
ERYTHROCYTE [DISTWIDTH] IN BLOOD BY AUTOMATED COUNT: 12 % (ref 11.5–14.5)
EST. GFR  (NO RACE VARIABLE): >60 ML/MIN/1.73 M^2
GLUCOSE SERPL-MCNC: 91 MG/DL (ref 70–110)
HCT VFR BLD AUTO: 43.4 % (ref 37–48.5)
HGB BLD-MCNC: 14.9 G/DL (ref 12–16)
IMM GRANULOCYTES # BLD AUTO: 0.01 K/UL (ref 0–0.04)
IMM GRANULOCYTES NFR BLD AUTO: 0.2 % (ref 0–0.5)
LYMPHOCYTES # BLD AUTO: 2.2 K/UL (ref 1–4.8)
LYMPHOCYTES NFR BLD: 37.1 % (ref 18–48)
MCH RBC QN AUTO: 30.8 PG (ref 27–31)
MCHC RBC AUTO-ENTMCNC: 34.3 G/DL (ref 32–36)
MCV RBC AUTO: 90 FL (ref 82–98)
MONOCYTES # BLD AUTO: 0.3 K/UL (ref 0.3–1)
MONOCYTES NFR BLD: 4.7 % (ref 4–15)
NEUTROPHILS # BLD AUTO: 3.4 K/UL (ref 1.8–7.7)
NEUTROPHILS NFR BLD: 57 % (ref 38–73)
NRBC BLD-RTO: 0 /100 WBC
PLATELET # BLD AUTO: 216 K/UL (ref 150–450)
PMV BLD AUTO: 11.5 FL (ref 9.2–12.9)
POTASSIUM SERPL-SCNC: 3.9 MMOL/L (ref 3.5–5.1)
PROT SERPL-MCNC: 8.9 G/DL (ref 6–8.4)
RBC # BLD AUTO: 4.83 M/UL (ref 4–5.4)
SODIUM SERPL-SCNC: 138 MMOL/L (ref 136–145)
TROPONIN I SERPL DL<=0.01 NG/ML-MCNC: <0.006 NG/ML (ref 0–0.03)
WBC # BLD AUTO: 5.9 K/UL (ref 3.9–12.7)

## 2023-01-11 PROCEDURE — 25000003 PHARM REV CODE 250: Performed by: EMERGENCY MEDICINE

## 2023-01-11 PROCEDURE — 81025 URINE PREGNANCY TEST: CPT | Performed by: EMERGENCY MEDICINE

## 2023-01-11 PROCEDURE — 80053 COMPREHEN METABOLIC PANEL: CPT | Performed by: EMERGENCY MEDICINE

## 2023-01-11 PROCEDURE — 85025 COMPLETE CBC W/AUTO DIFF WBC: CPT | Performed by: EMERGENCY MEDICINE

## 2023-01-11 PROCEDURE — 84484 ASSAY OF TROPONIN QUANT: CPT | Performed by: EMERGENCY MEDICINE

## 2023-01-11 PROCEDURE — 63600175 PHARM REV CODE 636 W HCPCS: Performed by: EMERGENCY MEDICINE

## 2023-01-11 RX ORDER — PROCHLORPERAZINE EDISYLATE 5 MG/ML
5 INJECTION INTRAMUSCULAR; INTRAVENOUS
Status: COMPLETED | OUTPATIENT
Start: 2023-01-11 | End: 2023-01-11

## 2023-01-11 RX ORDER — DIPHENHYDRAMINE HYDROCHLORIDE 50 MG/ML
12.5 INJECTION INTRAMUSCULAR; INTRAVENOUS
Status: COMPLETED | OUTPATIENT
Start: 2023-01-11 | End: 2023-01-11

## 2023-01-11 RX ADMIN — DIPHENHYDRAMINE HYDROCHLORIDE 12.5 MG: 50 INJECTION, SOLUTION INTRAMUSCULAR; INTRAVENOUS at 03:01

## 2023-01-11 RX ADMIN — SODIUM CHLORIDE 1000 ML: 9 INJECTION, SOLUTION INTRAVENOUS at 01:01

## 2023-01-11 RX ADMIN — PROCHLORPERAZINE EDISYLATE 5 MG: 5 INJECTION INTRAMUSCULAR; INTRAVENOUS at 03:01

## 2023-01-11 NOTE — ED NOTES
Patient identifiers for Danette Ang checked and correct.  LOC: Patient is awake, alert, and aware of environment with an appropriate affect. Patient is oriented x 3 and speaking appropriately.  APPEARANCE: Patient resting comfortably and in no acute distress. Patient is clean and well groomed, patient's clothing is properly fastened.  SKIN: The skin is warm and dry. Patient has normal skin turgor and moist mucus membrances. Skin is intact; no bruising or breakdown noted.  MUSKULOSKELETAL: Patient is moving all extremities well, no obvious deformities noted. Pulses intact.   RESPIRATORY: Airway is open and patent. Respirations are spontaneous and non-labored with normal effort and rate.  CARDIAC: Patient has a normal rate and rhythm. No peripheral edema noted. Capillary refill < 3 seconds.  ABDOMEN: No distention noted. Bowel sounds active in all 4 quadrants. Soft and non-tender upon palpation.  NEUROLOGICAL: PERRL. Facial expression is symmetrical. Hand grasps are equal bilaterally. Normal sensation in all extremities when touched with finger.  Allergies reported: Review of patient's allergies indicates:  No Known Allergies

## 2023-01-11 NOTE — FIRST PROVIDER EVALUATION
"Medical screening examination initiated.  I have conducted a focused provider triage encounter, findings are as follows:    Brief history of present illness:    Headache and nausea   Got some tylenol but didn't feel better.   Felt weak and had some chest pain   Has a history of "falling out episodes"   Denies any chance she's pregnant.   Reviewed medical record and noted eval by cardiology in 2021 for vasovagal syncope       Vitals:    01/10/23 2222   BP: (!) 138/99   Pulse: 100   Resp: 14   Temp: 97.5 °F (36.4 °C)   TempSrc: Oral   SpO2: 98%   Weight: 46.7 kg (103 lb)       Pertinent physical exam:  well appearing   EKG reviewed, no acute findings       Brief workup plan:  labs, fluids ordered    Preliminary workup initiated; this workup will be continued and followed by the physician or advanced practice provider that is assigned to the patient when roomed.  "

## 2023-01-11 NOTE — Clinical Note
"Danette MUNOZ"Luis Ang was seen and treated in our emergency department on 1/10/2023.  She may return to work on 01/14/2023.       If you have any questions or concerns, please don't hesitate to call.      Juliet Mcclure MD"

## 2023-01-11 NOTE — ED PROVIDER NOTES
Source of History:  Patient  Chart    Chief complaint:  Loss of Consciousness (Pt working upstairs started feeling dizzy, HA, and nausea. Pt does remember LOC. Denies hitting head. Pt had to wear a heart monitor before for previous episodes but came back negative. )      HPI:  Danette Ang is a 22 y.o. female with history of migraines, fibroids, spinal fusion 2018 presenting to emergency department with complaint of syncope.    Patient seen by Cardiology 01/21/2021 due to syncope.  A tilt-table test, Holter monitor, and echo were ordered.    Echo showed normal EF.  It was a technically difficult study.  Tilt-table was negative.  Holter monitor showed normal sinus rhythm.    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  She was having some headaches and nausea, felt weak, was walking out to her car, felt presyncopal, came back into the hospital and then lost consciousness.  She denies fevers.  Denies the possibility of pregnancy.  No chest pain or abdominal pain.  She does not have a clear reason to be dehydrated.  No URI symptoms.    Review of patient's allergies indicates:  No Known Allergies    No current facility-administered medications on file prior to encounter.     Current Outpatient Medications on File Prior to Encounter   Medication Sig Dispense Refill    (Magic mouthwash) 1:1:1 diphenhydramine(Benadryl) 12.5mg/5ml liq, aluminum & magnesium hydroxide-simethicone (Maalox), LIDOcaine viscous 2% Swish and spit 5 mLs every 4 (four) hours as needed (as needed for throat discomfort). for mouth sores 90 mL 0    benzonatate (TESSALON) 200 MG capsule Take 1 capsule (200 mg total) by mouth 3 (three) times daily as needed for Cough. 30 capsule 0    butalbital-acetaminophen-caffeine -40 mg (FIORICET, ESGIC) -40 mg per tablet Take 1 tablet by mouth every 4 to 6 hours as needed for Pain. 12 tablet 0    fluticasone propionate (FLONASE) 50 mcg/actuation nasal spray 1 spray (50 mcg total) by  Each Nostril route once daily. 9.9 mL 0    ibuprofen (ADVIL,MOTRIN) 800 MG tablet Take 1 tablet (800 mg total) by mouth every 8 (eight) hours as needed for Pain. 20 tablet 2    norethindrone (MICRONOR) 0.35 mg tablet Take 1 tablet (0.35 mg total) by mouth once daily. 28 tablet 12    ondansetron (ZOFRAN) 4 MG tablet Take 1 tablet (4 mg total) by mouth every 6 (six) hours as needed for Nausea. 18 tablet 0    propranoloL (INDERAL) 20 MG tablet Take 1 tablet (20 mg total) by mouth once daily. (Patient not taking: Reported on 9/29/2022) 30 tablet 1    rizatriptan (MAXALT-MLT) 10 MG disintegrating tablet Dissolve 1 tab on tongue at onset of migraine, may repeat dose in 2 hours if needed. Max 3 tabs/day. Max 3 days/week. 12 tablet 5    ulipristaL (JUN) 30 mg tablet Take 1 tablet (30 mg total) by mouth once. for 1 dose (Patient not taking: Reported on 9/29/2022) 1 tablet 0       PMH:  As per HPI and below:  Past Medical History:   Diagnosis Date    Anxiety     Migraine headache     Migraine with aura 08/15/2022    Scoliosis      Past Surgical History:   Procedure Laterality Date    BACK SURGERY  02/20/2018    SPINE SURGERY         Social History     Socioeconomic History    Marital status: Single   Tobacco Use    Smoking status: Never     Passive exposure: Never    Smokeless tobacco: Never   Substance and Sexual Activity    Alcohol use: Never    Drug use: Never    Sexual activity: Not Currently     Partners: Male     Birth control/protection: None       Family History   Problem Relation Age of Onset    Hypertension Mother     Stroke Mother     Aneurysm Mother     Cancer Maternal Grandmother     Breast cancer Neg Hx     Colon cancer Neg Hx     Ovarian cancer Neg Hx        Physical Exam:      Vitals:    01/11/23 0324   BP: (S) (!) 143/85   Pulse: (S) 108   Resp: 18   Temp:      Gen: No acute distress.  Nontoxic.  Well appearing.  Mental Status:  Alert and oriented .  Appropriate, conversant.  Skin: Warm, dry. No rashes seen.   No ecchymosis or signs of trauma.  Eyes: No conjunctival injection.  Pulm: CTAB. No increased work of breathing.  No significant tachypnea.  No audible stridor or wheezing.  No conversational dyspnea.    CV: Regular rate. Regular rhythm.   Abd: Soft.  Not distended.  Nontender.   MSK:  Neck supple, no meningismus.  Neuro: Awake. Speech normal. No focal neuro deficit observed.      Laboratory Studies:  Labs Reviewed   COMPREHENSIVE METABOLIC PANEL - Abnormal; Notable for the following components:       Result Value    Total Protein 8.9 (*)     Total Bilirubin 1.2 (*)     ALT 7 (*)     All other components within normal limits   CBC W/ AUTO DIFFERENTIAL   TROPONIN I   POCT URINE PREGNANCY   POCT GLUCOSE       EKG (independently interpreted by me):  Normal sinus rhythm, rate 100.  No STEMI.  QRS 94 milliseconds.   milliseconds.    Chart reviewed.     Imaging Results              CT Head Without Contrast (Final result)  Result time 01/11/23 02:35:46      Final result by Manuel Michaud MD (01/11/23 02:35:46)                   Impression:      No acute intracranial abnormalities      Electronically signed by: Manuel Michaud MD  Date:    01/11/2023  Time:    02:35               Narrative:    EXAMINATION:  CT HEAD WITHOUT CONTRAST    CLINICAL HISTORY:  Syncope, recurrent;Syncope +headache, states different than prior migraines and syncopal episodes;    TECHNIQUE:  Low dose axial images were obtained through the head.  Coronal and sagittal reformations were also performed. Contrast was not administered.    COMPARISON:  None.    FINDINGS:  The brain parenchyma appears normal for age with good corticomedullary differentiation.  There is no evidence of acute infarct, hemorrhage, or mass.  The ventricular system is normal in size.  No mass-effect or midline shift.  There are no abnormal extra-axial fluid collections.  The paranasal sinuses and mastoid air cells are clear.  The calvarium appears intact.  .                                       Medications Given:  Medications   sodium chloride 0.9% bolus 1,000 mL 1,000 mL (0 mLs Intravenous Stopped 1/11/23 0347)   prochlorperazine injection Soln 5 mg (5 mg Intravenous Given 1/11/23 0302)   diphenhydrAMINE injection 12.5 mg (12.5 mg Intravenous Given 1/11/23 0303)       MDM:    22 y.o. female with recurrent history of syncope presenting to ER with complaint of headache, syncope.  No focal neuro deficits.  Well appearing here.  No meningismus.  No fevers.  Hemodynamically stable.    EKG without evidence of ischemia or arrhythmia.    Orders from triage include CBC, CMP, troponin, and urine pregnancy test.  1 L of IV fluids was ordered.    Labs reviewed.  No leukocytosis or anemia.  CMP is unremarkable.  No elevation of troponin.  Negative urine pregnancy test.  CT of the head without acute abnormality.      Patient is not orthostatic.  Following Compazine, Benadryl, and IV fluids, she had resolution of her headache.  She feels better.  Appropriate for discharge home with outpatient follow-up.  Return precautions discussed at bedside    Diagnostic Impression:    1. Loss of consciousness    2. Nonintractable headache, unspecified chronicity pattern, unspecified headache type    3. Hypertension, unspecified type         ED Disposition Condition    Discharge Stable          ED Prescriptions    None       Follow-up Information       Follow up With Specialties Details Why Contact Info    Your primary care doctor  Schedule an appointment as soon as possible for a visit               Patient understands the plan and is in agreement, verbalized understanding, questions answered    Juliet Mcclure MD  Emergency Medicine         Juliet Mcclure MD  01/15/23 0118

## 2023-01-11 NOTE — Clinical Note
"Danette MUNOZ"Luis Ang was seen and treated in our emergency department on 1/10/2023.  She may return to work on 01/16/2023.       If you have any questions or concerns, please don't hesitate to call.      Juliet Mcclure MD"

## 2023-01-11 NOTE — DISCHARGE INSTRUCTIONS
Your blood pressure was elevated in the emergency department.  You must follow-up with your primary care doctor for adjustment of your medicine.    Tests today showed:   Labs Reviewed   COMPREHENSIVE METABOLIC PANEL - Abnormal; Notable for the following components:       Result Value    Total Protein 8.9 (*)     Total Bilirubin 1.2 (*)     ALT 7 (*)     All other components within normal limits   CBC W/ AUTO DIFFERENTIAL   TROPONIN I   POCT URINE PREGNANCY   POCT GLUCOSE     CT Head Without Contrast   Final Result      No acute intracranial abnormalities         Electronically signed by: Manuel Michaud MD   Date:    01/11/2023   Time:    02:35          Treatments you had today:   Medications   sodium chloride 0.9% bolus 1,000 mL 1,000 mL (1,000 mLs Intravenous New Bag 1/11/23 0122)   prochlorperazine injection Soln 5 mg (5 mg Intravenous Given 1/11/23 0302)   diphenhydrAMINE injection 12.5 mg (12.5 mg Intravenous Given 1/11/23 0303)       Follow-Up Plan:  - Follow-up with primary care doctor within 3 - 5 days  - Additional testing and/or evaluation as directed by your primary doctor    Return to the Emergency Department for symptoms including but not limited to: worsening symptoms, shortness of breath or chest pain, vomiting with inability to hold down fluids, fevers greater than 100.4°F, passing out/fainting/unconsciousness, or other concerning symptoms.

## 2023-01-24 ENCOUNTER — OFFICE VISIT (OUTPATIENT)
Dept: OBSTETRICS AND GYNECOLOGY | Facility: CLINIC | Age: 23
End: 2023-01-24
Attending: OBSTETRICS & GYNECOLOGY
Payer: MEDICAID

## 2023-01-24 VITALS
SYSTOLIC BLOOD PRESSURE: 122 MMHG | BODY MASS INDEX: 20.12 KG/M2 | WEIGHT: 102.5 LBS | HEIGHT: 60 IN | DIASTOLIC BLOOD PRESSURE: 84 MMHG

## 2023-01-24 DIAGNOSIS — Z11.3 SCREENING EXAMINATION FOR STD (SEXUALLY TRANSMITTED DISEASE): ICD-10-CM

## 2023-01-24 DIAGNOSIS — N89.8 VAGINAL DISCHARGE: Primary | ICD-10-CM

## 2023-01-24 PROCEDURE — 3074F PR MOST RECENT SYSTOLIC BLOOD PRESSURE < 130 MM HG: ICD-10-PCS | Mod: CPTII,,, | Performed by: OBSTETRICS & GYNECOLOGY

## 2023-01-24 PROCEDURE — 99213 OFFICE O/P EST LOW 20 MIN: CPT | Mod: S$PBB,,, | Performed by: OBSTETRICS & GYNECOLOGY

## 2023-01-24 PROCEDURE — 3074F SYST BP LT 130 MM HG: CPT | Mod: CPTII,,, | Performed by: OBSTETRICS & GYNECOLOGY

## 2023-01-24 PROCEDURE — 99213 PR OFFICE/OUTPT VISIT, EST, LEVL III, 20-29 MIN: ICD-10-PCS | Mod: S$PBB,,, | Performed by: OBSTETRICS & GYNECOLOGY

## 2023-01-24 PROCEDURE — 1159F PR MEDICATION LIST DOCUMENTED IN MEDICAL RECORD: ICD-10-PCS | Mod: CPTII,,, | Performed by: OBSTETRICS & GYNECOLOGY

## 2023-01-24 PROCEDURE — 1160F PR REVIEW ALL MEDS BY PRESCRIBER/CLIN PHARMACIST DOCUMENTED: ICD-10-PCS | Mod: CPTII,,, | Performed by: OBSTETRICS & GYNECOLOGY

## 2023-01-24 PROCEDURE — 3079F PR MOST RECENT DIASTOLIC BLOOD PRESSURE 80-89 MM HG: ICD-10-PCS | Mod: CPTII,,, | Performed by: OBSTETRICS & GYNECOLOGY

## 2023-01-24 PROCEDURE — 99999 PR PBB SHADOW E&M-EST. PATIENT-LVL III: CPT | Mod: PBBFAC,,, | Performed by: OBSTETRICS & GYNECOLOGY

## 2023-01-24 PROCEDURE — 1160F RVW MEDS BY RX/DR IN RCRD: CPT | Mod: CPTII,,, | Performed by: OBSTETRICS & GYNECOLOGY

## 2023-01-24 PROCEDURE — 81514 NFCT DS BV&VAGINITIS DNA ALG: CPT

## 2023-01-24 PROCEDURE — 87591 N.GONORRHOEAE DNA AMP PROB: CPT

## 2023-01-24 PROCEDURE — 1159F MED LIST DOCD IN RCRD: CPT | Mod: CPTII,,, | Performed by: OBSTETRICS & GYNECOLOGY

## 2023-01-24 PROCEDURE — 3008F BODY MASS INDEX DOCD: CPT | Mod: CPTII,,, | Performed by: OBSTETRICS & GYNECOLOGY

## 2023-01-24 PROCEDURE — 99999 PR PBB SHADOW E&M-EST. PATIENT-LVL III: ICD-10-PCS | Mod: PBBFAC,,, | Performed by: OBSTETRICS & GYNECOLOGY

## 2023-01-24 PROCEDURE — 3008F PR BODY MASS INDEX (BMI) DOCUMENTED: ICD-10-PCS | Mod: CPTII,,, | Performed by: OBSTETRICS & GYNECOLOGY

## 2023-01-24 PROCEDURE — 99213 OFFICE O/P EST LOW 20 MIN: CPT | Mod: PBBFAC,PN | Performed by: OBSTETRICS & GYNECOLOGY

## 2023-01-24 PROCEDURE — 3079F DIAST BP 80-89 MM HG: CPT | Mod: CPTII,,, | Performed by: OBSTETRICS & GYNECOLOGY

## 2023-01-24 NOTE — PROGRESS NOTES
Past medical, surgical, social, family, and obstetric histories; medications; prior records and results; and available outside records were reviewed and updated in the EMR.  Pertinent findings were noted below.    Reason for Visit   Vaginal Discharge and STD CHECK (Swabs only )    HPI   22 y.o. female     Patient's last menstrual period was 2023.    Patient here for concerns of vaginal discharge. Last night after using the restroom she noticed a small amount of grey discharge with a slight pink tinge. She denies itching/irritation, abnormal odor. She is sexually active with 1 male partner, uses condoms on occasion.    Contraception: POP  Pap: 2021, NILM  Mammogram: N/A    Exam   /84   Ht 5' (1.524 m)   Wt 46.5 kg (102 lb 8.2 oz)   LMP 2023   BMI 20.02 kg/m²     Physical Exam  Constitutional:       General: She is not in acute distress.     Appearance: Normal appearance. She is well-developed.   Genitourinary:      Vulva normal.      Right Labia: No rash, lesions or Bartholin's cyst.     Left Labia: No lesions, Bartholin's cyst or rash.     No vaginal discharge (physiologic discharge with small amount of old brown blood).      No cervical discharge or lesion.      Pelvic exam was performed with patient in the lithotomy position.   Pulmonary:      Effort: No respiratory distress.   Exam conducted with a chaperone present.     Assessment and Plan   Vaginal discharge  -     Vaginosis Screen by DNA Probe  -     C. trachomatis/N. gonorrhoeae by AMP DNA [facility]; Cervicovaginal    Screening examination for STD (sexually transmitted disease)  -     C. trachomatis/N. gonorrhoeae by AMP DNA [facility]; Cervicovaginal      Exam benign  GC/CT, affirm collected   Patient counseled on expected changes in discharge with changing of birth control to POPs from OCPs   Safe sex practices discussed, including regular use of condoms       Guerita Womack MD  OBGYN, PGY-2

## 2023-01-27 LAB
C TRACH DNA SPEC QL NAA+PROBE: NOT DETECTED
N GONORRHOEA DNA SPEC QL NAA+PROBE: NOT DETECTED

## 2023-04-07 ENCOUNTER — PATIENT MESSAGE (OUTPATIENT)
Dept: OBSTETRICS AND GYNECOLOGY | Facility: CLINIC | Age: 23
End: 2023-04-07
Payer: MEDICAID

## 2023-04-14 ENCOUNTER — OFFICE VISIT (OUTPATIENT)
Dept: OBSTETRICS AND GYNECOLOGY | Facility: CLINIC | Age: 23
End: 2023-04-14
Payer: MEDICAID

## 2023-04-14 VITALS
SYSTOLIC BLOOD PRESSURE: 122 MMHG | WEIGHT: 99.63 LBS | DIASTOLIC BLOOD PRESSURE: 84 MMHG | HEIGHT: 60 IN | BODY MASS INDEX: 19.56 KG/M2

## 2023-04-14 DIAGNOSIS — N89.8 VAGINAL DISCHARGE: Primary | ICD-10-CM

## 2023-04-14 DIAGNOSIS — Z20.2 POTENTIAL EXPOSURE TO STD: ICD-10-CM

## 2023-04-14 PROCEDURE — 1159F MED LIST DOCD IN RCRD: CPT | Mod: CPTII,,, | Performed by: FAMILY MEDICINE

## 2023-04-14 PROCEDURE — 1159F PR MEDICATION LIST DOCUMENTED IN MEDICAL RECORD: ICD-10-PCS | Mod: CPTII,,, | Performed by: FAMILY MEDICINE

## 2023-04-14 PROCEDURE — 3079F DIAST BP 80-89 MM HG: CPT | Mod: CPTII,,, | Performed by: FAMILY MEDICINE

## 2023-04-14 PROCEDURE — 3008F PR BODY MASS INDEX (BMI) DOCUMENTED: ICD-10-PCS | Mod: CPTII,,, | Performed by: FAMILY MEDICINE

## 2023-04-14 PROCEDURE — 1160F PR REVIEW ALL MEDS BY PRESCRIBER/CLIN PHARMACIST DOCUMENTED: ICD-10-PCS | Mod: CPTII,,, | Performed by: FAMILY MEDICINE

## 2023-04-14 PROCEDURE — 3008F BODY MASS INDEX DOCD: CPT | Mod: CPTII,,, | Performed by: FAMILY MEDICINE

## 2023-04-14 PROCEDURE — 99213 OFFICE O/P EST LOW 20 MIN: CPT | Mod: PBBFAC | Performed by: FAMILY MEDICINE

## 2023-04-14 PROCEDURE — 99999 PR PBB SHADOW E&M-EST. PATIENT-LVL III: CPT | Mod: PBBFAC,,, | Performed by: FAMILY MEDICINE

## 2023-04-14 PROCEDURE — 1160F RVW MEDS BY RX/DR IN RCRD: CPT | Mod: CPTII,,, | Performed by: FAMILY MEDICINE

## 2023-04-14 PROCEDURE — 99999 PR PBB SHADOW E&M-EST. PATIENT-LVL III: ICD-10-PCS | Mod: PBBFAC,,, | Performed by: FAMILY MEDICINE

## 2023-04-14 PROCEDURE — 99213 PR OFFICE/OUTPT VISIT, EST, LEVL III, 20-29 MIN: ICD-10-PCS | Mod: S$PBB,,, | Performed by: FAMILY MEDICINE

## 2023-04-14 PROCEDURE — 3074F SYST BP LT 130 MM HG: CPT | Mod: CPTII,,, | Performed by: FAMILY MEDICINE

## 2023-04-14 PROCEDURE — 87591 N.GONORRHOEAE DNA AMP PROB: CPT | Performed by: FAMILY MEDICINE

## 2023-04-14 PROCEDURE — 99213 OFFICE O/P EST LOW 20 MIN: CPT | Mod: S$PBB,,, | Performed by: FAMILY MEDICINE

## 2023-04-14 PROCEDURE — 87510 GARDNER VAG DNA DIR PROBE: CPT | Performed by: FAMILY MEDICINE

## 2023-04-14 PROCEDURE — 3074F PR MOST RECENT SYSTOLIC BLOOD PRESSURE < 130 MM HG: ICD-10-PCS | Mod: CPTII,,, | Performed by: FAMILY MEDICINE

## 2023-04-14 PROCEDURE — 3079F PR MOST RECENT DIASTOLIC BLOOD PRESSURE 80-89 MM HG: ICD-10-PCS | Mod: CPTII,,, | Performed by: FAMILY MEDICINE

## 2023-04-14 PROCEDURE — 87480 CANDIDA DNA DIR PROBE: CPT | Performed by: FAMILY MEDICINE

## 2023-04-14 NOTE — PROGRESS NOTES
"  CC: Vaginitis  HPI: Patient presents for evaluation of an abnormal vaginal discharge. Symptoms have been present for {1-10:21822} {time; units:26816::"days"}. Vaginal symptoms: {symptoms:84940}. Contraception: {contraceptive method:5051}. She denies {symptoms:19577}. Sexually transmitted infection risk: {std risk:10931}. Menstrual flow: {menses:715}. This is the extent of the patient's complaints at this time.     ROS:  GENERAL: No fever, chills, fatigability or weight loss.  VULVAR: No pain, no lesions and no itching.  VAGINAL: No relaxation, no itching, no discharge, no abnormal bleeding and no lesions.  URINARY: No incontinence, no nocturia, no frequency and no dysuria.     PHYSICAL EXAM:  Physical Exam      Past Medical History:   Diagnosis Date    Anxiety     Migraine headache     Migraine with aura 08/15/2022    Scoliosis        Past Surgical History:   Procedure Laterality Date    BACK SURGERY  02/20/2018    SPINE SURGERY         Family History   Problem Relation Age of Onset    Hypertension Mother     Stroke Mother     Aneurysm Mother     Cancer Maternal Grandmother     Breast cancer Neg Hx     Colon cancer Neg Hx     Ovarian cancer Neg Hx        Social History     Socioeconomic History    Marital status: Single   Tobacco Use    Smoking status: Never     Passive exposure: Never    Smokeless tobacco: Never   Substance and Sexual Activity    Alcohol use: Never    Drug use: Never    Sexual activity: Yes     Partners: Male     Birth control/protection: OCP       Current Outpatient Medications   Medication Sig Dispense Refill    (Magic mouthwash) 1:1:1 diphenhydramine(Benadryl) 12.5mg/5ml liq, aluminum & magnesium hydroxide-simethicone (Maalox), LIDOcaine viscous 2% Swish and spit 5 mLs every 4 (four) hours as needed (as needed for throat discomfort). for mouth sores 90 mL 0    benzonatate (TESSALON) 200 MG capsule Take 1 capsule (200 mg total) by mouth 3 (three) times daily as needed for Cough. 30 capsule 0    " butalbital-acetaminophen-caffeine -40 mg (FIORICET, ESGIC) -40 mg per tablet Take 1 tablet by mouth every 4 to 6 hours as needed for Pain. 12 tablet 0    fluticasone propionate (FLONASE) 50 mcg/actuation nasal spray 1 spray (50 mcg total) by Each Nostril route once daily. 9.9 mL 0    ibuprofen (ADVIL,MOTRIN) 800 MG tablet Take 1 tablet (800 mg total) by mouth every 8 (eight) hours as needed for Pain. 20 tablet 2    norethindrone (MICRONOR) 0.35 mg tablet Take 1 tablet (0.35 mg total) by mouth once daily. 28 tablet 12    ondansetron (ZOFRAN) 4 MG tablet Take 1 tablet (4 mg total) by mouth every 6 (six) hours as needed for Nausea. 18 tablet 0    rizatriptan (MAXALT-MLT) 10 MG disintegrating tablet Dissolve 1 tab on tongue at onset of migraine, may repeat dose in 2 hours if needed. Max 3 tabs/day. Max 3 days/week. 12 tablet 5    propranoloL (INDERAL) 20 MG tablet Take 1 tablet (20 mg total) by mouth once daily. (Patient not taking: Reported on 2022) 30 tablet 1    ulipristaL (JUN) 30 mg tablet Take 1 tablet (30 mg total) by mouth once. for 1 dose (Patient not taking: Reported on 2022) 1 tablet 0     No current facility-administered medications for this visit.       Review of patient's allergies indicates:  No Known Allergies       OB History    Para Term  AB Living   0 0 0 0 0 0   SAB IAB Ectopic Multiple Live Births   0 0 0 0 0          Assessment/Plan:    There are no diagnoses linked to this encounter.      Patient was counseled today on vaginitis prevention including :  a. avoiding feminine products such as deoderant soaps, body wash, bubble bath, douches, scented toilet paper, deoderant tampons or pads, feminine wipes, chronic pad use, etc.  b. avoiding other vulvovaginal irritants such as long hot baths, humidity, tight, synthetic clothing, chlorine and sitting around in wet bathing suits  c. wearing cotton underwear, avoiding thong underwear and no underwear to bed  d. taking  showers instead of baths and use a hair dryer on cool setting afterwards to dry  e. wearing cotton to exercise and shower immediately after exercise and change clothes  f. using polyurethane condoms without spermicide if sexually active and symptoms are triggered by intercourse     FOLLOW UP: PRN/lack of improvement.

## 2023-04-14 NOTE — PROGRESS NOTES
Note written by ROSALINDA mcclelland student. Addended by myself where needed. I was present for exam/discussion.     CC: Vaginitis  HPI: Danette Ang is a 21 y/o female  presenting for evaluation of an abnormal vaginal discharge and itching for 1 week. Vaginal symptoms: discharge described as white and curd-like, odor, and vulvar itching. Patient took oral fluconazole on Wednesday and has seen complete resolution of symptoms. Patient has one male sexual partner and is not concerned about STIs at this time. Patient and her partner do not use condoms. Contraception: oral progesterone-only contraceptive.She denies burning, lesions, pain, and urinary symptoms of chills, dysuria, fever  , flank pain bilaterally, hematuria, lower abdominal pain, nausea, urinary frequency, and vomiting. Sexually transmitted infection risk: very low risk of STD exposure. Menstrual flow: rare. She started Norethindrone in January and has not had a period since. Her periods prior to starting BC were regular.     ROS:  GENERAL: No fever, chills, fatigability or weight loss.  VULVAR: No pain, no lesions and +itching.  VAGINAL: No relaxation, +itching, +discharge, no abnormal bleeding and no lesions.  URINARY: No incontinence, no nocturia, no frequency and no dysuria.     PHYSICAL EXAM:  Physical Exam:   Constitutional: She appears well-developed and well-nourished. She does not appear ill. No distress.             Abdominal: Soft.     Genitourinary:    Uterus, right adnexa and left adnexa normal.      Pelvic exam was performed with patient supine.   The external female genitalia was normal.   No external genitalia lesions identified,There is no rash, tenderness or lesion on the right labia. There is no rash, tenderness or lesion on the left labia. Cervix is normal. There is vaginal discharge in the vagina. No erythema or tenderness in the vagina.               Neurological: She is alert.         Past Medical History:   Diagnosis Date     Anxiety     Migraine headache     Migraine with aura 08/15/2022    Scoliosis        Past Surgical History:   Procedure Laterality Date    BACK SURGERY  02/20/2018    SPINE SURGERY         Family History   Problem Relation Age of Onset    Hypertension Mother     Stroke Mother     Aneurysm Mother     Cancer Maternal Grandmother     Breast cancer Neg Hx     Colon cancer Neg Hx     Ovarian cancer Neg Hx        Social History     Socioeconomic History    Marital status: Single   Tobacco Use    Smoking status: Never     Passive exposure: Never    Smokeless tobacco: Never   Substance and Sexual Activity    Alcohol use: Never    Drug use: Never    Sexual activity: Yes     Partners: Male     Birth control/protection: OCP       Current Outpatient Medications   Medication Sig Dispense Refill    (Magic mouthwash) 1:1:1 diphenhydramine(Benadryl) 12.5mg/5ml liq, aluminum & magnesium hydroxide-simethicone (Maalox), LIDOcaine viscous 2% Swish and spit 5 mLs every 4 (four) hours as needed (as needed for throat discomfort). for mouth sores 90 mL 0    benzonatate (TESSALON) 200 MG capsule Take 1 capsule (200 mg total) by mouth 3 (three) times daily as needed for Cough. 30 capsule 0    butalbital-acetaminophen-caffeine -40 mg (FIORICET, ESGIC) -40 mg per tablet Take 1 tablet by mouth every 4 to 6 hours as needed for Pain. 12 tablet 0    fluticasone propionate (FLONASE) 50 mcg/actuation nasal spray 1 spray (50 mcg total) by Each Nostril route once daily. 9.9 mL 0    ibuprofen (ADVIL,MOTRIN) 800 MG tablet Take 1 tablet (800 mg total) by mouth every 8 (eight) hours as needed for Pain. 20 tablet 2    norethindrone (MICRONOR) 0.35 mg tablet Take 1 tablet (0.35 mg total) by mouth once daily. 28 tablet 12    ondansetron (ZOFRAN) 4 MG tablet Take 1 tablet (4 mg total) by mouth every 6 (six) hours as needed for Nausea. 18 tablet 0    rizatriptan (MAXALT-MLT) 10 MG disintegrating tablet Dissolve 1 tab on tongue at onset of migraine,  may repeat dose in 2 hours if needed. Max 3 tabs/day. Max 3 days/week. 12 tablet 5    propranoloL (INDERAL) 20 MG tablet Take 1 tablet (20 mg total) by mouth once daily. (Patient not taking: Reported on 2022) 30 tablet 1    ulipristaL (JUN) 30 mg tablet Take 1 tablet (30 mg total) by mouth once. for 1 dose (Patient not taking: Reported on 2022) 1 tablet 0     No current facility-administered medications for this visit.       Review of patient's allergies indicates:  No Known Allergies       OB History    Para Term  AB Living   0 0 0 0 0 0   SAB IAB Ectopic Multiple Live Births   0 0 0 0 0          Assessment/Plan:    Vaginal discharge  -     Vaginosis Screen by DNA Probe    Potential exposure to STD  -     C. trachomatis/N. gonorrhoeae by AMP DNA [facility]; Cervicovaginal    -sx resolved currently, would still like swabs done    Patient was counseled today on vaginitis prevention including :  a. avoiding feminine products such as deoderant soaps, body wash, bubble bath, douches, scented toilet paper, deoderant tampons or pads, feminine wipes, chronic pad use, etc.  b. avoiding other vulvovaginal irritants such as long hot baths, humidity, tight, synthetic clothing, chlorine and sitting around in wet bathing suits  c. wearing cotton underwear, avoiding thong underwear and no underwear to bed  d. taking showers instead of baths and use a hair dryer on cool setting afterwards to dry  e. wearing cotton to exercise and shower immediately after exercise and change clothes  f. using polyurethane condoms without spermicide if sexually active and symptoms are triggered by intercourse     FOLLOW UP: PRN/lack of improvement.

## 2023-04-15 LAB
C TRACH DNA SPEC QL NAA+PROBE: NOT DETECTED
N GONORRHOEA DNA SPEC QL NAA+PROBE: NOT DETECTED

## 2023-05-03 ENCOUNTER — PATIENT MESSAGE (OUTPATIENT)
Dept: OBSTETRICS AND GYNECOLOGY | Facility: CLINIC | Age: 23
End: 2023-05-03
Payer: MEDICAID

## 2023-05-03 DIAGNOSIS — N94.6 DYSMENORRHEA: ICD-10-CM

## 2023-05-03 RX ORDER — IBUPROFEN 800 MG/1
800 TABLET ORAL EVERY 8 HOURS PRN
Qty: 20 TABLET | Refills: 2 | Status: SHIPPED | OUTPATIENT
Start: 2023-05-03 | End: 2024-02-12 | Stop reason: SDUPTHER

## 2023-05-17 ENCOUNTER — OFFICE VISIT (OUTPATIENT)
Dept: NEUROLOGY | Facility: CLINIC | Age: 23
End: 2023-05-17
Payer: MEDICAID

## 2023-05-17 VITALS
SYSTOLIC BLOOD PRESSURE: 134 MMHG | WEIGHT: 99 LBS | BODY MASS INDEX: 19.33 KG/M2 | HEART RATE: 82 BPM | DIASTOLIC BLOOD PRESSURE: 98 MMHG

## 2023-05-17 DIAGNOSIS — G43.009 MIGRAINE WITHOUT AURA AND WITHOUT STATUS MIGRAINOSUS, NOT INTRACTABLE: Primary | ICD-10-CM

## 2023-05-17 PROCEDURE — 1159F PR MEDICATION LIST DOCUMENTED IN MEDICAL RECORD: ICD-10-PCS | Mod: CPTII,,, | Performed by: NURSE PRACTITIONER

## 2023-05-17 PROCEDURE — 99214 OFFICE O/P EST MOD 30 MIN: CPT | Mod: S$PBB,,, | Performed by: NURSE PRACTITIONER

## 2023-05-17 PROCEDURE — 1159F MED LIST DOCD IN RCRD: CPT | Mod: CPTII,,, | Performed by: NURSE PRACTITIONER

## 2023-05-17 PROCEDURE — 3080F DIAST BP >= 90 MM HG: CPT | Mod: CPTII,,, | Performed by: NURSE PRACTITIONER

## 2023-05-17 PROCEDURE — 99999 PR PBB SHADOW E&M-EST. PATIENT-LVL III: CPT | Mod: PBBFAC,,, | Performed by: NURSE PRACTITIONER

## 2023-05-17 PROCEDURE — 99213 OFFICE O/P EST LOW 20 MIN: CPT | Mod: PBBFAC | Performed by: NURSE PRACTITIONER

## 2023-05-17 PROCEDURE — 99214 PR OFFICE/OUTPT VISIT, EST, LEVL IV, 30-39 MIN: ICD-10-PCS | Mod: S$PBB,,, | Performed by: NURSE PRACTITIONER

## 2023-05-17 PROCEDURE — 3008F BODY MASS INDEX DOCD: CPT | Mod: CPTII,,, | Performed by: NURSE PRACTITIONER

## 2023-05-17 PROCEDURE — 99999 PR PBB SHADOW E&M-EST. PATIENT-LVL III: ICD-10-PCS | Mod: PBBFAC,,, | Performed by: NURSE PRACTITIONER

## 2023-05-17 PROCEDURE — 3080F PR MOST RECENT DIASTOLIC BLOOD PRESSURE >= 90 MM HG: ICD-10-PCS | Mod: CPTII,,, | Performed by: NURSE PRACTITIONER

## 2023-05-17 PROCEDURE — 3008F PR BODY MASS INDEX (BMI) DOCUMENTED: ICD-10-PCS | Mod: CPTII,,, | Performed by: NURSE PRACTITIONER

## 2023-05-17 PROCEDURE — 3075F PR MOST RECENT SYSTOLIC BLOOD PRESS GE 130-139MM HG: ICD-10-PCS | Mod: CPTII,,, | Performed by: NURSE PRACTITIONER

## 2023-05-17 PROCEDURE — 1160F PR REVIEW ALL MEDS BY PRESCRIBER/CLIN PHARMACIST DOCUMENTED: ICD-10-PCS | Mod: CPTII,,, | Performed by: NURSE PRACTITIONER

## 2023-05-17 PROCEDURE — 3075F SYST BP GE 130 - 139MM HG: CPT | Mod: CPTII,,, | Performed by: NURSE PRACTITIONER

## 2023-05-17 PROCEDURE — 1160F RVW MEDS BY RX/DR IN RCRD: CPT | Mod: CPTII,,, | Performed by: NURSE PRACTITIONER

## 2023-05-17 RX ORDER — RIZATRIPTAN BENZOATE 10 MG/1
TABLET, ORALLY DISINTEGRATING ORAL
Qty: 12 TABLET | Refills: 5 | Status: SHIPPED | OUTPATIENT
Start: 2023-05-17 | End: 2023-08-23

## 2023-05-17 RX ORDER — RIMEGEPANT SULFATE 75 MG/75MG
75 TABLET, ORALLY DISINTEGRATING ORAL DAILY PRN
Qty: 8 TABLET | Refills: 2 | Status: SHIPPED | OUTPATIENT
Start: 2023-05-17 | End: 2023-08-23 | Stop reason: SDUPTHER

## 2023-05-17 RX ORDER — NORTRIPTYLINE HYDROCHLORIDE 10 MG/1
10 CAPSULE ORAL NIGHTLY
Qty: 30 CAPSULE | Refills: 5 | Status: SHIPPED | OUTPATIENT
Start: 2023-05-17 | End: 2023-08-24

## 2023-05-17 NOTE — PROGRESS NOTES
NEW PATIENT CONSULT  SUBJECTIVE:  Patient ID: Danette Ang   MRN: [id number]  Referred By: No ref. provider found  Chief Complaint: Consult    History of Present Illness:   22 y.o. female with migraines, who presents to clinic alone for evaluation of headaches.  Previous patient of ROSALINDA Key, here to re-establish care given Filibertochapise's departure.  Currently suffering 5-6 headache days per month.  Not taking anything for migraine prevention.      Otherwise, information below is still accurate and current.     History of Present Illness  This is a 21 y.o. female who presents to clinic alone for evaluation of headaches  She notes she has been experiencing migraine headaches since she was first diagnosed in 6th grade and previously saw neurologist at [facility] She describes her migraine headaches as a throbbing pain, that feels like her head is going to explode, this pain occurs in her frontalis and parietal region near the top of her head on both sides and notes her headaches are associated with neck pain, photophobia, senstivity to sound and smell, dizziness, watery eyes. She notes her migraine headaches  tend to occur in clusters every 2-3 days a week and will usually last up to 12 hours but recently have started to last as long as 2-3 days, since beginning of February. She notes prior to February she would have 1 Headache every 2 weeks. She notes she has noticed that she her  migrianes occur more often and are more severe around her menstrual cycle , noting she will experience a migraine headache  either the day before or on the 1st day of her menstrual cycle and she will take an ibuprofen 800 mg and this will help relief the headache, but notes when she experiences a headache not related to her mesntrual cycle and she will often have to treat the headaches with 1st line ibuprofen 800 mg as well as 2 excedrin migraines every 8 hour. She notes this combination of medications does help relief her headaches, but notes she  often has to take 4 Excedrin migraines in a day before she gets relief and she does want to have to take this much medication so frequently, notes she currently takes these medications at least 2-3 times a week. She notes she has tried sumatriptan and amitriptyline in the past for tx of her migraine headaches, but the sumatriptan did not help relief her headaches and she did not like the way amitriptyline made her feel.   Previous Hx of HA -she also notes she experiences occasional tension type headaches and sinus headaches.  Location - pain occurs in her frontalis and parietal region near the top of her head on both sides. She  Describes her tension headaches are  a pressure like pain that occurs behind either  left eye and are not as intense as her migraine headaches and can be relieved with warm compress.   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     Caffeine- yes, coke 2 cans a day 2-3 days a week   Alcohol - denies  Smoke - denies     Medications tried and failed: toradol- helps, ibuprofen 800 mg- helps if HA near her menstrual cycle, Excedrin migraines (2), Imitrex, Amitriptyline, Cymbalta 10 mg     Treatments Tried and Response  Sumatriptan  Amitriptyline    Nurtec   Rizatriptan   Toradol     Current Medications:    Current Outpatient Medications:     fluticasone propionate (FLONASE) 50 mcg/actuation nasal spray, 1 spray (50 mcg total) by Each Nostril  route once daily., Disp: 9.9 mL, Rfl: 0    ibuprofen (ADVIL,MOTRIN) 800 MG tablet, Take 1 tablet (800 mg total) by mouth every 8 (eight) hours as needed for Pain., Disp: 20 tablet, Rfl: 2    norethindrone (MICRONOR) 0.35 mg tablet, Take 1 tablet (0.35 mg total) by mouth once daily., Disp: 28 tablet, Rfl: 12    rizatriptan (MAXALT-MLT) 10 MG disintegrating tablet, Dissolve 1 tab on tongue at onset of migraine, may repeat dose in 2 hours if needed. Max 3 tabs/day. Max 3 days/week., Disp: 12 tablet, Rfl: 5    Review of Systems - A review of 10+ systems was conducted with pertinent positive and negative findings documented in HPI with all other systems reviewed and negative.    PFSH: Past medical, family, and social history reviewed as documented in chart with pertinent positive medical, family, and social history detailed in HPI.    OBJECTIVE:  Vitals:  BP (!) 134/98   Pulse 82   Wt 44.9 kg (98 lb 15.8 oz)   BMI 19.33 kg/m²      Physical Exam:  Constitutional: she appears well-developed and well-nourished. she is well groomed. NAD    Review of Data:   Notes from ROSALINDA Key, GYN reviewed   Labs:  Office Visit on 04/14/2023   Component Date Value Ref Range Status    Trichomonas vaginalis 04/14/2023 Negative  Negative Final    Gardnerella vaginalis 04/14/2023 Negative  Negative Final    Candida sp 04/14/2023 Negative  Negative Final    Chlamydia, Amplified DNA 04/14/2023 Not Detected  Not Detected Final    N gonorrhoeae, amplified DNA 04/14/2023 Not Detected  Not Detected Final   Office Visit on 01/24/2023   Component Date Value Ref Range Status    Trichomonas vaginalis 01/24/2023 Negative  Negative Final    Candida sp 01/24/2023 Negative  Negative Final    Marilin glabrata DNA 01/24/2023 Negative  Negative Final    Marilin krusei DNA 01/24/2023 Negative  Negative Final    Bacterial vaginosis DNA 01/24/2023 Negative  Negative Final    Chlamydia, Amplified DNA 01/24/2023 Not Detected  Not Detected Final    N  gonorrhoeae, amplified DNA 01/24/2023 Not Detected  Not Detected Final   Admission on 01/11/2023, Discharged on 01/11/2023   Component Date Value Ref Range Status    POCT Glucose 01/10/2023 80  70 - 110 mg/dL Final    WBC 01/11/2023 5.90  3.90 - 12.70 K/uL Final    RBC 01/11/2023 4.83  4.00 - 5.40 M/uL Final    Hemoglobin 01/11/2023 14.9  12.0 - 16.0 g/dL Final    Hematocrit 01/11/2023 43.4  37.0 - 48.5 % Final    MCV 01/11/2023 90  82 - 98 fL Final    MCH 01/11/2023 30.8  27.0 - 31.0 pg Final    MCHC 01/11/2023 34.3  32.0 - 36.0 g/dL Final    RDW 01/11/2023 12.0  11.5 - 14.5 % Final    Platelets 01/11/2023 216  150 - 450 K/uL Final    MPV 01/11/2023 11.5  9.2 - 12.9 fL Final    Immature Granulocytes 01/11/2023 0.2  0.0 - 0.5 % Final    Gran # (ANC) 01/11/2023 3.4  1.8 - 7.7 K/uL Final    Immature Grans (Abs) 01/11/2023 0.01  0.00 - 0.04 K/uL Final    Lymph # 01/11/2023 2.2  1.0 - 4.8 K/uL Final    Mono # 01/11/2023 0.3  0.3 - 1.0 K/uL Final    Eos # 01/11/2023 0.0  0.0 - 0.5 K/uL Final    Baso # 01/11/2023 0.02  0.00 - 0.20 K/uL Final    nRBC 01/11/2023 0  0 /100 WBC Final    Gran % 01/11/2023 57.0  38.0 - 73.0 % Final    Lymph % 01/11/2023 37.1  18.0 - 48.0 % Final    Mono % 01/11/2023 4.7  4.0 - 15.0 % Final    Eosinophil % 01/11/2023 0.7  0.0 - 8.0 % Final    Basophil % 01/11/2023 0.3  0.0 - 1.9 % Final    Differential Method 01/11/2023 Automated   Final    Sodium 01/11/2023 138  136 - 145 mmol/L Final    Potassium 01/11/2023 3.9  3.5 - 5.1 mmol/L Final    Chloride 01/11/2023 104  95 - 110 mmol/L Final    CO2 01/11/2023 25  23 - 29 mmol/L Final    Glucose 01/11/2023 91  70 - 110 mg/dL Final    BUN 01/11/2023 8  6 - 20 mg/dL Final    Creatinine 01/11/2023 0.8  0.5 - 1.4 mg/dL Final    Calcium 01/11/2023 10.2  8.7 - 10.5 mg/dL Final    Total Protein 01/11/2023 8.9 (H)  6.0 - 8.4 g/dL Final    Albumin 01/11/2023 4.6  3.5 - 5.2 g/dL Final    Total Bilirubin 01/11/2023 1.2 (H)  0.1 - 1.0 mg/dL Final    Alkaline  Phosphatase 01/11/2023 64  55 - 135 U/L Final    AST 01/11/2023 23  10 - 40 U/L Final    ALT 01/11/2023 7 (L)  10 - 44 U/L Final    Anion Gap 01/11/2023 9  8 - 16 mmol/L Final    eGFR 01/11/2023 >60.0  >60 mL/min/1.73 m^2 Final    POC Preg Test, Ur 01/11/2023 Negative  Negative Final     Acceptable 01/11/2023 Yes   Final    Troponin I 01/11/2023 <0.006  0.000 - 0.026 ng/mL Final   Office Visit on 01/03/2023   Component Date Value Ref Range Status    Trichomonas vaginalis 01/03/2023 Negative  Negative Final    Candida sp 01/03/2023 Negative  Negative Final    Marilin glabrata DNA 01/03/2023 Negative  Negative Final    Marilin krusei DNA 01/03/2023 Negative  Negative Final    Bacterial vaginosis DNA 01/03/2023 Positive (A)  Negative Final    Chlamydia, Amplified DNA 01/03/2023 Not Detected  Not Detected Final    N gonorrhoeae, amplified DNA 01/03/2023 Not Detected  Not Detected Final     Imaging:  Results for orders placed or performed during the hospital encounter of 01/11/23   CT Head Without Contrast    Narrative    EXAMINATION:  CT HEAD WITHOUT CONTRAST    CLINICAL HISTORY:  Syncope, recurrent;Syncope +headache, states different than prior migraines and syncopal episodes;    TECHNIQUE:  Low dose axial images were obtained through the head.  Coronal and sagittal reformations were also performed. Contrast was not administered.    COMPARISON:  None.    FINDINGS:  The brain parenchyma appears normal for age with good corticomedullary differentiation.  There is no evidence of acute infarct, hemorrhage, or mass.  The ventricular system is normal in size.  No mass-effect or midline shift.  There are no abnormal extra-axial fluid collections.  The paranasal sinuses and mastoid air cells are clear.  The calvarium appears intact.  .      Impression    No acute intracranial abnormalities      Electronically signed by: Manuel Michaud MD  Date:    01/11/2023  Time:    02:35     Note: I have independently  reviewed any/all imaging/labs/tests and agree with the report (s) as documented.  Any discrepancies will be as noted/demarcated by free text.  DIAMOND ALVARADO 5/17/2023    ASSESSMENT:  1. Migraine without aura and without status migrainosus, not intractable      PLAN:  - Start nortriptyline 10 mg nightly   - For acute migraines - maxalt 10 mg mlt   - Secondary treatment option - nurtec 75 mg odt   - RTC in 3 months or sooner if needed     Orders Placed This Encounter    nortriptyline (PAMELOR) 10 MG capsule    rizatriptan (MAXALT-MLT) 10 MG disintegrating tablet    rimegepant (NURTEC) 75 mg odt     Questions and concerns were sought and answered to the patient's stated verbal satisfaction.  The patient verbalizes understanding and agreement with the above stated treatment plan.     CC: Primary Doctor DIAMOND Beauchamp  [facility]  [phone]    Dr. Gee was available during today's encounter.

## 2023-05-19 ENCOUNTER — TELEPHONE (OUTPATIENT)
Dept: PHARMACY | Facility: CLINIC | Age: 23
End: 2023-05-19
Payer: MEDICAID

## 2023-06-19 ENCOUNTER — TELEPHONE (OUTPATIENT)
Dept: OBSTETRICS AND GYNECOLOGY | Facility: CLINIC | Age: 23
End: 2023-06-19
Payer: MEDICAID

## 2023-06-19 NOTE — TELEPHONE ENCOUNTER
----- Message from Dot Sanon sent at 6/19/2023 11:16 AM CDT -----  Pt reached to speak with the providers office in regards to being seen this week. Preferably Tomorrow or Wednesday for irregular bleeding. Pls call and advise.

## 2023-06-20 ENCOUNTER — PATIENT MESSAGE (OUTPATIENT)
Dept: OBSTETRICS AND GYNECOLOGY | Facility: CLINIC | Age: 23
End: 2023-06-20
Payer: MEDICAID

## 2023-06-21 ENCOUNTER — OFFICE VISIT (OUTPATIENT)
Dept: OBSTETRICS AND GYNECOLOGY | Facility: CLINIC | Age: 23
End: 2023-06-21
Attending: OBSTETRICS & GYNECOLOGY
Payer: MEDICAID

## 2023-06-21 VITALS
BODY MASS INDEX: 19.22 KG/M2 | DIASTOLIC BLOOD PRESSURE: 78 MMHG | WEIGHT: 97.88 LBS | SYSTOLIC BLOOD PRESSURE: 120 MMHG | HEIGHT: 60 IN

## 2023-06-21 DIAGNOSIS — Z32.02 NEGATIVE PREGNANCY TEST: ICD-10-CM

## 2023-06-21 DIAGNOSIS — N93.9 ABNORMAL UTERINE BLEEDING (AUB): Primary | ICD-10-CM

## 2023-06-21 DIAGNOSIS — N93.9 ABNORMAL UTERINE BLEEDING (AUB): ICD-10-CM

## 2023-06-21 LAB
B-HCG UR QL: NEGATIVE
BASOPHILS # BLD AUTO: 0.03 K/UL (ref 0–0.2)
BASOPHILS NFR BLD: 0.6 % (ref 0–1.9)
CTP QC/QA: YES
DIFFERENTIAL METHOD: ABNORMAL
EOSINOPHIL # BLD AUTO: 0.1 K/UL (ref 0–0.5)
EOSINOPHIL NFR BLD: 1.9 % (ref 0–8)
ERYTHROCYTE [DISTWIDTH] IN BLOOD BY AUTOMATED COUNT: 11.9 % (ref 11.5–14.5)
HCT VFR BLD AUTO: 40.8 % (ref 37–48.5)
HGB BLD-MCNC: 13.2 G/DL (ref 12–16)
IMM GRANULOCYTES # BLD AUTO: 0.01 K/UL (ref 0–0.04)
IMM GRANULOCYTES NFR BLD AUTO: 0.2 % (ref 0–0.5)
LYMPHOCYTES # BLD AUTO: 2.4 K/UL (ref 1–4.8)
LYMPHOCYTES NFR BLD: 49.5 % (ref 18–48)
MCH RBC QN AUTO: 30.1 PG (ref 27–31)
MCHC RBC AUTO-ENTMCNC: 32.4 G/DL (ref 32–36)
MCV RBC AUTO: 93 FL (ref 82–98)
MONOCYTES # BLD AUTO: 0.4 K/UL (ref 0.3–1)
MONOCYTES NFR BLD: 7.3 % (ref 4–15)
NEUTROPHILS # BLD AUTO: 1.9 K/UL (ref 1.8–7.7)
NEUTROPHILS NFR BLD: 40.5 % (ref 38–73)
NRBC BLD-RTO: 0 /100 WBC
PLATELET # BLD AUTO: 163 K/UL (ref 150–450)
PMV BLD AUTO: 12.6 FL (ref 9.2–12.9)
RBC # BLD AUTO: 4.39 M/UL (ref 4–5.4)
TSH SERPL DL<=0.005 MIU/L-ACNC: 0.89 UIU/ML (ref 0.4–4)
WBC # BLD AUTO: 4.79 K/UL (ref 3.9–12.7)

## 2023-06-21 PROCEDURE — 3078F PR MOST RECENT DIASTOLIC BLOOD PRESSURE < 80 MM HG: ICD-10-PCS | Mod: CPTII,,, | Performed by: OBSTETRICS & GYNECOLOGY

## 2023-06-21 PROCEDURE — 81025 URINE PREGNANCY TEST: CPT | Mod: PBBFAC,PN | Performed by: OBSTETRICS & GYNECOLOGY

## 2023-06-21 PROCEDURE — 84443 ASSAY THYROID STIM HORMONE: CPT | Performed by: OBSTETRICS & GYNECOLOGY

## 2023-06-21 PROCEDURE — 99213 OFFICE O/P EST LOW 20 MIN: CPT | Mod: S$PBB,,, | Performed by: OBSTETRICS & GYNECOLOGY

## 2023-06-21 PROCEDURE — 3008F PR BODY MASS INDEX (BMI) DOCUMENTED: ICD-10-PCS | Mod: CPTII,,, | Performed by: OBSTETRICS & GYNECOLOGY

## 2023-06-21 PROCEDURE — 3008F BODY MASS INDEX DOCD: CPT | Mod: CPTII,,, | Performed by: OBSTETRICS & GYNECOLOGY

## 2023-06-21 PROCEDURE — 99213 OFFICE O/P EST LOW 20 MIN: CPT | Mod: PBBFAC,PN | Performed by: OBSTETRICS & GYNECOLOGY

## 2023-06-21 PROCEDURE — 99213 PR OFFICE/OUTPT VISIT, EST, LEVL III, 20-29 MIN: ICD-10-PCS | Mod: S$PBB,,, | Performed by: OBSTETRICS & GYNECOLOGY

## 2023-06-21 PROCEDURE — 85025 COMPLETE CBC W/AUTO DIFF WBC: CPT | Performed by: OBSTETRICS & GYNECOLOGY

## 2023-06-21 PROCEDURE — 1160F PR REVIEW ALL MEDS BY PRESCRIBER/CLIN PHARMACIST DOCUMENTED: ICD-10-PCS | Mod: CPTII,,, | Performed by: OBSTETRICS & GYNECOLOGY

## 2023-06-21 PROCEDURE — 99999 PR PBB SHADOW E&M-EST. PATIENT-LVL III: ICD-10-PCS | Mod: PBBFAC,,, | Performed by: OBSTETRICS & GYNECOLOGY

## 2023-06-21 PROCEDURE — 1159F MED LIST DOCD IN RCRD: CPT | Mod: CPTII,,, | Performed by: OBSTETRICS & GYNECOLOGY

## 2023-06-21 PROCEDURE — 3078F DIAST BP <80 MM HG: CPT | Mod: CPTII,,, | Performed by: OBSTETRICS & GYNECOLOGY

## 2023-06-21 PROCEDURE — 3074F SYST BP LT 130 MM HG: CPT | Mod: CPTII,,, | Performed by: OBSTETRICS & GYNECOLOGY

## 2023-06-21 PROCEDURE — 1159F PR MEDICATION LIST DOCUMENTED IN MEDICAL RECORD: ICD-10-PCS | Mod: CPTII,,, | Performed by: OBSTETRICS & GYNECOLOGY

## 2023-06-21 PROCEDURE — 3074F PR MOST RECENT SYSTOLIC BLOOD PRESSURE < 130 MM HG: ICD-10-PCS | Mod: CPTII,,, | Performed by: OBSTETRICS & GYNECOLOGY

## 2023-06-21 PROCEDURE — 99999 PR PBB SHADOW E&M-EST. PATIENT-LVL III: CPT | Mod: PBBFAC,,, | Performed by: OBSTETRICS & GYNECOLOGY

## 2023-06-21 PROCEDURE — 1160F RVW MEDS BY RX/DR IN RCRD: CPT | Mod: CPTII,,, | Performed by: OBSTETRICS & GYNECOLOGY

## 2023-06-21 RX ORDER — TIMOLOL MALEATE 5 MG/ML
1 SOLUTION/ DROPS OPHTHALMIC
COMMUNITY
Start: 2023-02-25 | End: 2023-06-21

## 2023-06-23 ENCOUNTER — HOSPITAL ENCOUNTER (OUTPATIENT)
Dept: RADIOLOGY | Facility: OTHER | Age: 23
Discharge: HOME | End: 2023-06-23
Attending: OBSTETRICS & GYNECOLOGY
Payer: MEDICAID

## 2023-06-23 DIAGNOSIS — N93.9 ABNORMAL UTERINE BLEEDING (AUB): ICD-10-CM

## 2023-06-23 PROCEDURE — 76856 US PELVIS COMPLETE NON OB: ICD-10-PCS | Mod: 26,,, | Performed by: RADIOLOGY

## 2023-06-23 PROCEDURE — 76856 US EXAM PELVIC COMPLETE: CPT | Mod: TC

## 2023-06-23 PROCEDURE — 76856 US EXAM PELVIC COMPLETE: CPT | Mod: 26,,, | Performed by: RADIOLOGY

## 2023-07-21 ENCOUNTER — OFFICE VISIT (OUTPATIENT)
Dept: OBSTETRICS AND GYNECOLOGY | Facility: CLINIC | Age: 23
End: 2023-07-21
Payer: MEDICAID

## 2023-07-21 VITALS
SYSTOLIC BLOOD PRESSURE: 120 MMHG | WEIGHT: 100.31 LBS | DIASTOLIC BLOOD PRESSURE: 74 MMHG | BODY MASS INDEX: 19.59 KG/M2

## 2023-07-21 DIAGNOSIS — Z20.2 POTENTIAL EXPOSURE TO STD: ICD-10-CM

## 2023-07-21 DIAGNOSIS — N89.8 VAGINAL ODOR: Primary | ICD-10-CM

## 2023-07-21 DIAGNOSIS — N76.0 BACTERIAL VAGINOSIS: ICD-10-CM

## 2023-07-21 DIAGNOSIS — B96.89 BACTERIAL VAGINOSIS: ICD-10-CM

## 2023-07-21 LAB
CANDIDA RRNA VAG QL PROBE: NEGATIVE
G VAGINALIS RRNA GENITAL QL PROBE: POSITIVE
T VAGINALIS RRNA GENITAL QL PROBE: NEGATIVE

## 2023-07-21 PROCEDURE — 3078F PR MOST RECENT DIASTOLIC BLOOD PRESSURE < 80 MM HG: ICD-10-PCS | Mod: CPTII,,, | Performed by: FAMILY MEDICINE

## 2023-07-21 PROCEDURE — 3074F SYST BP LT 130 MM HG: CPT | Mod: CPTII,,, | Performed by: FAMILY MEDICINE

## 2023-07-21 PROCEDURE — 99213 PR OFFICE/OUTPT VISIT, EST, LEVL III, 20-29 MIN: ICD-10-PCS | Mod: S$PBB,,, | Performed by: FAMILY MEDICINE

## 2023-07-21 PROCEDURE — 87660 TRICHOMONAS VAGIN DIR PROBE: CPT | Performed by: FAMILY MEDICINE

## 2023-07-21 PROCEDURE — 99213 OFFICE O/P EST LOW 20 MIN: CPT | Mod: S$PBB,,, | Performed by: FAMILY MEDICINE

## 2023-07-21 PROCEDURE — 1159F MED LIST DOCD IN RCRD: CPT | Mod: CPTII,,, | Performed by: FAMILY MEDICINE

## 2023-07-21 PROCEDURE — 3074F PR MOST RECENT SYSTOLIC BLOOD PRESSURE < 130 MM HG: ICD-10-PCS | Mod: CPTII,,, | Performed by: FAMILY MEDICINE

## 2023-07-21 PROCEDURE — 87510 GARDNER VAG DNA DIR PROBE: CPT | Performed by: FAMILY MEDICINE

## 2023-07-21 PROCEDURE — 3008F BODY MASS INDEX DOCD: CPT | Mod: CPTII,,, | Performed by: FAMILY MEDICINE

## 2023-07-21 PROCEDURE — 99999 PR PBB SHADOW E&M-EST. PATIENT-LVL III: CPT | Mod: PBBFAC,,, | Performed by: FAMILY MEDICINE

## 2023-07-21 PROCEDURE — 1159F PR MEDICATION LIST DOCUMENTED IN MEDICAL RECORD: ICD-10-PCS | Mod: CPTII,,, | Performed by: FAMILY MEDICINE

## 2023-07-21 PROCEDURE — 87591 N.GONORRHOEAE DNA AMP PROB: CPT | Performed by: FAMILY MEDICINE

## 2023-07-21 PROCEDURE — 1160F RVW MEDS BY RX/DR IN RCRD: CPT | Mod: CPTII,,, | Performed by: FAMILY MEDICINE

## 2023-07-21 PROCEDURE — 1160F PR REVIEW ALL MEDS BY PRESCRIBER/CLIN PHARMACIST DOCUMENTED: ICD-10-PCS | Mod: CPTII,,, | Performed by: FAMILY MEDICINE

## 2023-07-21 PROCEDURE — 99213 OFFICE O/P EST LOW 20 MIN: CPT | Mod: PBBFAC | Performed by: FAMILY MEDICINE

## 2023-07-21 PROCEDURE — 3078F DIAST BP <80 MM HG: CPT | Mod: CPTII,,, | Performed by: FAMILY MEDICINE

## 2023-07-21 PROCEDURE — 3008F PR BODY MASS INDEX (BMI) DOCUMENTED: ICD-10-PCS | Mod: CPTII,,, | Performed by: FAMILY MEDICINE

## 2023-07-21 PROCEDURE — 87480 CANDIDA DNA DIR PROBE: CPT | Performed by: FAMILY MEDICINE

## 2023-07-21 PROCEDURE — 99999 PR PBB SHADOW E&M-EST. PATIENT-LVL III: ICD-10-PCS | Mod: PBBFAC,,, | Performed by: FAMILY MEDICINE

## 2023-07-21 RX ORDER — METRONIDAZOLE 500 MG/1
500 TABLET ORAL EVERY 12 HOURS
Qty: 14 TABLET | Refills: 0 | Status: SHIPPED | OUTPATIENT
Start: 2023-07-21 | End: 2023-08-23

## 2023-07-21 NOTE — PROGRESS NOTES
CC: Vaginitis  HPI: Patient presents for evaluation of an abnormal vaginal discharge. Symptoms have been present for 7 days. Vaginal symptoms: odor. Contraception: oral progesterone-only contraceptive. She denies discharge, local irritation, urinary symptoms of dysuria, hematuria, and fever, and vulvar itching. Sexually transmitted infection risk: very low risk of STD exposure. SA male unprotected. This is the extent of the patient's complaints at this time.     ROS:  GENERAL: No fever, chills, fatigability or weight loss.  VULVAR: No pain, no lesions and no itching.  VAGINAL: No relaxation, no itching, no discharge, no abnormal bleeding and no lesions. +odor  URINARY: No incontinence, no nocturia, no frequency and no dysuria.     PHYSICAL EXAM:  Physical Exam:   Constitutional: She appears well-developed and well-nourished. She does not appear ill. No distress.               Genitourinary:    Uterus, right adnexa and left adnexa normal.      Pelvic exam was performed with patient supine.   The external female genitalia was normal.   Labial bartholins normal.There is no rash, tenderness or lesion on the right labia. There is no rash, tenderness or lesion on the left labia. Cervix is normal. Right adnexum displays no mass, no tenderness and no fullness. Left adnexum displays no mass, no tenderness and no fullness. There is vaginal discharge (thin white/yellowish) in the vagina. No tenderness, bleeding, rectocele, cystocele or unspecified prolapse of vaginal walls in the vagina.    No foreign body in the vagina.   Cervix exhibits no motion tenderness, no lesion, no discharge, no friability, no lesion and no polyp. Normal urethral meatus.Urethra findings: no urethral mass              Neurological: She is alert. GCS eye subscore is 4. GCS verbal subscore is 5. GCS motor subscore is 6.         Past Medical History:   Diagnosis Date    Anxiety     Migraine headache     Migraine with aura 08/15/2022    Scoliosis         Past Surgical History:   Procedure Laterality Date    BACK SURGERY  2018    SPINE SURGERY         Family History   Problem Relation Age of Onset    Hypertension Mother     Stroke Mother     Aneurysm Mother     Cancer Maternal Grandmother     Breast cancer Neg Hx     Colon cancer Neg Hx     Ovarian cancer Neg Hx        Social History     Socioeconomic History    Marital status: Single   Tobacco Use    Smoking status: Never     Passive exposure: Never    Smokeless tobacco: Never   Substance and Sexual Activity    Alcohol use: Never    Drug use: Never    Sexual activity: Yes     Partners: Male     Birth control/protection: OCP       Current Outpatient Medications   Medication Sig Dispense Refill    fluticasone propionate (FLONASE) 50 mcg/actuation nasal spray 1 spray (50 mcg total) by Each Nostril route once daily. 9.9 mL 0    ibuprofen (ADVIL,MOTRIN) 800 MG tablet Take 1 tablet (800 mg total) by mouth every 8 (eight) hours as needed for Pain. 20 tablet 2    norethindrone (MICRONOR) 0.35 mg tablet Take 1 tablet (0.35 mg total) by mouth once daily. 28 tablet 12    nortriptyline (PAMELOR) 10 MG capsule Take 1 capsule (10 mg total) by mouth every evening. 30 capsule 5    rimegepant (NURTEC) 75 mg odt Take 1 tablet (75 mg total) by mouth daily as needed for Migraine. Place ODT tablet on the tongue; alternatively the ODT tablet may be placed under the tongue 8 tablet 2    rizatriptan (MAXALT-MLT) 10 MG disintegrating tablet Dissolve 1 tab on tongue at onset of migraine, may repeat dose in 2 hours if needed. Max 3 tabs/day. Max 3 days/week. 12 tablet 5    metroNIDAZOLE (FLAGYL) 500 MG tablet Take 1 tablet (500 mg total) by mouth every 12 (twelve) hours. Eat when taking, do not drink alcohol while taking and for 2 days after stopping for 7 days 14 tablet 0     No current facility-administered medications for this visit.       Review of patient's allergies indicates:  No Known Allergies       OB History     Para Term  AB Living   0 0 0 0 0 0   SAB IAB Ectopic Multiple Live Births   0 0 0 0 0          Assessment/Plan:    Vaginal odor  -     Vaginosis Screen by DNA Probe    Bacterial vaginosis  -     metroNIDAZOLE (FLAGYL) 500 MG tablet; Take 1 tablet (500 mg total) by mouth every 12 (twelve) hours. Eat when taking, do not drink alcohol while taking and for 2 days after stopping for 7 days  Dispense: 14 tablet; Refill: 0    Potential exposure to STD  -     C. trachomatis/N. gonorrhoeae by AMP DNA [facility]; Cervicovaginal          Patient was counseled today on vaginitis prevention including :  a. avoiding feminine products such as deoderant soaps, body wash, bubble bath, douches, scented toilet paper, deoderant tampons or pads, feminine wipes, chronic pad use, etc.  b. avoiding other vulvovaginal irritants such as long hot baths, humidity, tight, synthetic clothing, chlorine and sitting around in wet bathing suits  c. wearing cotton underwear, avoiding thong underwear and no underwear to bed  d. taking showers instead of baths and use a hair dryer on cool setting afterwards to dry  e. wearing cotton to exercise and shower immediately after exercise and change clothes  f. using polyurethane condoms without spermicide if sexually active and symptoms are triggered by intercourse     FOLLOW UP: PRN/lack of improvement.

## 2023-07-22 LAB
C TRACH DNA SPEC QL NAA+PROBE: NOT DETECTED
N GONORRHOEA DNA SPEC QL NAA+PROBE: NOT DETECTED

## 2023-08-23 ENCOUNTER — OFFICE VISIT (OUTPATIENT)
Dept: NEUROLOGY | Facility: CLINIC | Age: 23
End: 2023-08-23
Payer: MEDICAID

## 2023-08-23 VITALS
DIASTOLIC BLOOD PRESSURE: 98 MMHG | BODY MASS INDEX: 18.47 KG/M2 | HEART RATE: 80 BPM | WEIGHT: 94.56 LBS | SYSTOLIC BLOOD PRESSURE: 148 MMHG

## 2023-08-23 DIAGNOSIS — I10 ELEVATED BLOOD PRESSURE READING IN OFFICE WITH DIAGNOSIS OF HYPERTENSION: ICD-10-CM

## 2023-08-23 DIAGNOSIS — G43.009 MIGRAINE WITHOUT AURA AND WITHOUT STATUS MIGRAINOSUS, NOT INTRACTABLE: Primary | ICD-10-CM

## 2023-08-23 PROCEDURE — 3077F PR MOST RECENT SYSTOLIC BLOOD PRESSURE >= 140 MM HG: ICD-10-PCS | Mod: CPTII,,, | Performed by: NURSE PRACTITIONER

## 2023-08-23 PROCEDURE — 99214 OFFICE O/P EST MOD 30 MIN: CPT | Mod: S$PBB,,, | Performed by: NURSE PRACTITIONER

## 2023-08-23 PROCEDURE — 1159F PR MEDICATION LIST DOCUMENTED IN MEDICAL RECORD: ICD-10-PCS | Mod: CPTII,,, | Performed by: NURSE PRACTITIONER

## 2023-08-23 PROCEDURE — 99214 PR OFFICE/OUTPT VISIT, EST, LEVL IV, 30-39 MIN: ICD-10-PCS | Mod: S$PBB,,, | Performed by: NURSE PRACTITIONER

## 2023-08-23 PROCEDURE — 3077F SYST BP >= 140 MM HG: CPT | Mod: CPTII,,, | Performed by: NURSE PRACTITIONER

## 2023-08-23 PROCEDURE — 99213 OFFICE O/P EST LOW 20 MIN: CPT | Mod: PBBFAC | Performed by: NURSE PRACTITIONER

## 2023-08-23 PROCEDURE — 3008F BODY MASS INDEX DOCD: CPT | Mod: CPTII,,, | Performed by: NURSE PRACTITIONER

## 2023-08-23 PROCEDURE — 99999 PR PBB SHADOW E&M-EST. PATIENT-LVL III: CPT | Mod: PBBFAC,,, | Performed by: NURSE PRACTITIONER

## 2023-08-23 PROCEDURE — 99999 PR PBB SHADOW E&M-EST. PATIENT-LVL III: ICD-10-PCS | Mod: PBBFAC,,, | Performed by: NURSE PRACTITIONER

## 2023-08-23 PROCEDURE — 1159F MED LIST DOCD IN RCRD: CPT | Mod: CPTII,,, | Performed by: NURSE PRACTITIONER

## 2023-08-23 PROCEDURE — 3080F DIAST BP >= 90 MM HG: CPT | Mod: CPTII,,, | Performed by: NURSE PRACTITIONER

## 2023-08-23 PROCEDURE — 1160F PR REVIEW ALL MEDS BY PRESCRIBER/CLIN PHARMACIST DOCUMENTED: ICD-10-PCS | Mod: CPTII,,, | Performed by: NURSE PRACTITIONER

## 2023-08-23 PROCEDURE — 3008F PR BODY MASS INDEX (BMI) DOCUMENTED: ICD-10-PCS | Mod: CPTII,,, | Performed by: NURSE PRACTITIONER

## 2023-08-23 PROCEDURE — 3080F PR MOST RECENT DIASTOLIC BLOOD PRESSURE >= 90 MM HG: ICD-10-PCS | Mod: CPTII,,, | Performed by: NURSE PRACTITIONER

## 2023-08-23 PROCEDURE — 1160F RVW MEDS BY RX/DR IN RCRD: CPT | Mod: CPTII,,, | Performed by: NURSE PRACTITIONER

## 2023-08-23 RX ORDER — RIMEGEPANT SULFATE 75 MG/75MG
75 TABLET, ORALLY DISINTEGRATING ORAL DAILY PRN
Qty: 8 TABLET | Refills: 2 | Status: SHIPPED | OUTPATIENT
Start: 2023-08-23

## 2023-08-23 NOTE — PROGRESS NOTES
Established Patient   SUBJECTIVE:  Patient ID: Danette Ang   Chief Complaint: Follow-up    History of Present Illness:  Danette Ang is a 23 y.o. female who presents to clinic alone for follow-up of headaches.     Recommendations made at last Office Visit on 5/17/23:  - Start nortriptyline 10 mg nightly   - For acute migraines - maxalt 10 mg mlt   - Secondary treatment option - nurtec 75 mg odt   - RTC in 3 months or sooner if needed     08/23/2023 - Interval History:  Migraines have improved, currently experiencing 2-3 migraines per month.  She has not been taking nortriptyline for migraine prevention and is not interested in starting/taking anything for migraine prevention.  Here today as she needs refill on nurtec.  Blood pressure elevated in clinic today 148/98, has upcoming appt with outside primary care provider.     Otherwise, information below is still accurate and current.     History of Present Illness:   22 y.o. female with migraines, who presents to clinic alone for evaluation of headaches.  Previous patient of ROSALINDA Key, here to re-establish care given Juliana's departure.  Currently suffering 5-6 headache days per month.  Not taking anything for migraine prevention.       Otherwise, information below is still accurate and current.      History of Present Illness  This is a 21 y.o. female who presents to clinic alone for evaluation of headaches  She notes she has been experiencing migraine headaches since she was first diagnosed in 6th grade and previously saw neurologist at [facility] She describes her migraine headaches as a throbbing pain, that feels like her head is going to explode, this pain occurs in her frontalis and parietal region near the top of her head on both sides and notes her headaches are associated with neck pain, photophobia, senstivity to sound and smell, dizziness, watery eyes. She notes her migraine headaches  tend to occur in clusters every 2-3 days a week and will usually last  up to 12 hours but recently have started to last as long as 2-3 days, since beginning of February. She notes prior to February she would have 1 Headache every 2 weeks. She notes she has noticed that she her  migrianes occur more often and are more severe around her menstrual cycle , noting she will experience a migraine headache  either the day before or on the 1st day of her menstrual cycle and she will take an ibuprofen 800 mg and this will help relief the headache, but notes when she experiences a headache not related to her mesntrual cycle and she will often have to treat the headaches with 1st line ibuprofen 800 mg as well as 2 excedrin migraines every 8 hour. She notes this combination of medications does help relief her headaches, but notes she often has to take 4 Excedrin migraines in a day before she gets relief and she does want to have to take this much medication so frequently, notes she currently takes these medications at least 2-3 times a week. She notes she has tried sumatriptan and amitriptyline in the past for tx of her migraine headaches, but the sumatriptan did not help relief her headaches and she did not like the way amitriptyline made her feel.   Previous Hx of HA -she also notes she experiences occasional tension type headaches and sinus headaches.  Location - pain occurs in her frontalis and parietal region near the top of her head on both sides. She  Describes her tension headaches are  a pressure like pain that occurs behind either  left eye and are not as intense as her migraine headaches and can be relieved with warm compress.   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     Caffeine- yes, coke 2 cans a day 2-3 days a week   Alcohol - denies  Smoke - denies     Medications tried and failed: toradol- helps, ibuprofen 800 mg- helps if HA near her menstrual cycle, Excedrin migraines (2), Imitrex, Amitriptyline, Cymbalta 10 mg      Treatments Tried and Response  Sumatriptan  Amitriptyline    Nurtec   Rizatriptan   Toradol   Nortriptyline - did not take   Nurtec - helps     Current Medications:    fluticasone propionate (FLONASE) 50 mcg/actuation nasal spray, 1 spray (50 mcg total) by Each Nostril route once daily., Disp: 9.9 mL, Rfl: 0    ibuprofen (ADVIL,MOTRIN) 800 MG tablet, Take 1 tablet (800 mg total) by mouth every 8 (eight) hours as needed for Pain., Disp: 20 tablet, Rfl: 2    norethindrone (MICRONOR) 0.35 mg tablet, Take 1 tablet (0.35 mg total) by mouth once daily., Disp: 28 tablet, Rfl: 12    rimegepant (NURTEC) 75 mg odt, Take 1 tablet (75 mg total) by mouth daily as needed for Migraine. Place ODT tablet on the tongue; alternatively the ODT tablet may be placed under the tongue, Disp: 8 tablet, Rfl: 2    Review of Systems - A review of 10+ systems was conducted with pertinent positive and negative findings documented in HPI with all other systems reviewed and negative.    PFSH: Past medical, family, and social history reviewed as documented in chart with pertinent positive medical, family, and social history detailed in HPI.    OBJECTIVE:  Vitals:  BP (!) 148/98   Pulse 80   Wt 42.9 kg (94 lb 9.2 oz)   BMI 18.47 kg/m²      Physical Exam:  Constitutional: she appears well-developed and well-nourished. she is well groomed. NAD    Review of Data:   Notes from GYN reviewed   Labs:  Office Visit on 07/21/2023   Component Date Value Ref  Range Status    Trichomonas vaginalis 07/21/2023 Negative  Negative Final    Gardnerella vaginalis 07/21/2023 Positive (A)  Negative Final    Candida sp 07/21/2023 Negative  Negative Final    Chlamydia, Amplified DNA 07/21/2023 Not Detected  Not Detected Final    N gonorrhoeae, amplified DNA 07/21/2023 Not Detected  Not Detected Final   Procedure visit on 06/21/2023   Component Date Value Ref Range Status    WBC 06/21/2023 4.79  3.90 - 12.70 K/uL Final    RBC 06/21/2023 4.39  4.00 - 5.40 M/uL Final    Hemoglobin 06/21/2023 13.2  12.0 - 16.0 g/dL Final    Hematocrit 06/21/2023 40.8  37.0 - 48.5 % Final    MCV 06/21/2023 93  82 - 98 fL Final    MCH 06/21/2023 30.1  27.0 - 31.0 pg Final    MCHC 06/21/2023 32.4  32.0 - 36.0 g/dL Final    RDW 06/21/2023 11.9  11.5 - 14.5 % Final    Platelets 06/21/2023 163  150 - 450 K/uL Final    MPV 06/21/2023 12.6  9.2 - 12.9 fL Final    Immature Granulocytes 06/21/2023 0.2  0.0 - 0.5 % Final    Gran # (ANC) 06/21/2023 1.9  1.8 - 7.7 K/uL Final    Immature Grans (Abs) 06/21/2023 0.01  0.00 - 0.04 K/uL Final    Lymph # 06/21/2023 2.4  1.0 - 4.8 K/uL Final    Mono # 06/21/2023 0.4  0.3 - 1.0 K/uL Final    Eos # 06/21/2023 0.1  0.0 - 0.5 K/uL Final    Baso # 06/21/2023 0.03  0.00 - 0.20 K/uL Final    nRBC 06/21/2023 0  0 /100 WBC Final    Gran % 06/21/2023 40.5  38.0 - 73.0 % Final    Lymph % 06/21/2023 49.5 (H)  18.0 - 48.0 % Final    Mono % 06/21/2023 7.3  4.0 - 15.0 % Final    Eosinophil % 06/21/2023 1.9  0.0 - 8.0 % Final    Basophil % 06/21/2023 0.6  0.0 - 1.9 % Final    Differential Method 06/21/2023 Automated   Final    TSH 06/21/2023 0.885  0.400 - 4.000 uIU/mL Final   Office Visit on 06/21/2023   Component Date Value Ref Range Status    POC Preg Test, Ur 06/21/2023 Negative  Negative Final     Acceptable 06/21/2023 Yes   Final   Office Visit on 04/14/2023   Component Date Value Ref Range Status    Trichomonas vaginalis 04/14/2023 Negative  Negative Final     Gardnerella vaginalis 04/14/2023 Negative  Negative Final    Candida sp 04/14/2023 Negative  Negative Final    Chlamydia, Amplified DNA 04/14/2023 Not Detected  Not Detected Final    N gonorrhoeae, amplified DNA 04/14/2023 Not Detected  Not Detected Final     Imaging:  Results for orders placed or performed during the hospital encounter of 01/11/23   CT Head Without Contrast    Narrative    EXAMINATION:  CT HEAD WITHOUT CONTRAST    CLINICAL HISTORY:  Syncope, recurrent;Syncope +headache, states different than prior migraines and syncopal episodes;    TECHNIQUE:  Low dose axial images were obtained through the head.  Coronal and sagittal reformations were also performed. Contrast was not administered.    COMPARISON:  None.    FINDINGS:  The brain parenchyma appears normal for age with good corticomedullary differentiation.  There is no evidence of acute infarct, hemorrhage, or mass.  The ventricular system is normal in size.  No mass-effect or midline shift.  There are no abnormal extra-axial fluid collections.  The paranasal sinuses and mastoid air cells are clear.  The calvarium appears intact.  .      Impression    No acute intracranial abnormalities      Electronically signed by: Manuel Michaud MD  Date:    01/11/2023  Time:    02:35     Note: I have independently reviewed any/all imaging/labs/tests and agree with the report (s) as documented.  Any discrepancies will be as noted/demarcated by free text.  DIAMOND ALVARADO 8/23/2023    ASSESSMENT:  1. Migraine without aura and without status migrainosus, not intractable    2. Elevated blood pressure reading in office with diagnosis of hypertension      PLAN:  - Not interested in taking anything for migraine prevention at this time   - For acute migraines - nurtec 75 mg odt   - Triptans contraindicated given continued elevated blood pressure readings    - Continue tracking headaches   - Elevated BP - keep upcoming appt with outside PCP for management/monitoring   - RTC  in 6 months or sooner if needed     Orders Placed This Encounter    rimegepant (NURTEC) 75 mg odt     Questions and concerns were sought and answered to the patient's stated verbal satisfaction.  The patient verbalizes understanding and agreement with the above stated treatment plan.     CC: No, Primary Doctor      Lindsey Marinelli, FNP-C  [facility]   [phone]    Dr. Gee was available during today's encounter.

## 2023-08-29 ENCOUNTER — OFFICE VISIT (OUTPATIENT)
Dept: URGENT CARE | Facility: CLINIC | Age: 23
End: 2023-08-29
Payer: MEDICAID

## 2023-08-29 VITALS
RESPIRATION RATE: 19 BRPM | OXYGEN SATURATION: 98 % | TEMPERATURE: 98 F | HEIGHT: 60 IN | DIASTOLIC BLOOD PRESSURE: 90 MMHG | HEART RATE: 81 BPM | BODY MASS INDEX: 18.46 KG/M2 | SYSTOLIC BLOOD PRESSURE: 130 MMHG | WEIGHT: 94 LBS

## 2023-08-29 DIAGNOSIS — R05.9 COUGH, UNSPECIFIED TYPE: Primary | ICD-10-CM

## 2023-08-29 LAB
CTP QC/QA: YES
MOLECULAR STREP A: NEGATIVE
POC MOLECULAR INFLUENZA A AGN: NEGATIVE
POC MOLECULAR INFLUENZA B AGN: NEGATIVE
SARS-COV-2 AG RESP QL IA.RAPID: NEGATIVE

## 2023-08-29 PROCEDURE — 87811 SARS-COV-2 COVID19 W/OPTIC: CPT | Mod: QW,S$GLB,, | Performed by: NURSE PRACTITIONER

## 2023-08-29 PROCEDURE — 99204 OFFICE O/P NEW MOD 45 MIN: CPT | Mod: S$GLB,,, | Performed by: NURSE PRACTITIONER

## 2023-08-29 PROCEDURE — 99204 PR OFFICE/OUTPT VISIT, NEW, LEVL IV, 45-59 MIN: ICD-10-PCS | Mod: S$GLB,,, | Performed by: NURSE PRACTITIONER

## 2023-08-29 PROCEDURE — 87651 POCT STREP A MOLECULAR: ICD-10-PCS | Mod: QW,S$GLB,, | Performed by: NURSE PRACTITIONER

## 2023-08-29 PROCEDURE — 87811 SARS CORONAVIRUS 2 ANTIGEN POCT, MANUAL READ: ICD-10-PCS | Mod: QW,S$GLB,, | Performed by: NURSE PRACTITIONER

## 2023-08-29 PROCEDURE — 87502 INFLUENZA DNA AMP PROBE: CPT | Mod: QW,S$GLB,, | Performed by: NURSE PRACTITIONER

## 2023-08-29 PROCEDURE — 87502 POCT INFLUENZA A/B MOLECULAR: ICD-10-PCS | Mod: QW,S$GLB,, | Performed by: NURSE PRACTITIONER

## 2023-08-29 PROCEDURE — 87651 STREP A DNA AMP PROBE: CPT | Mod: QW,S$GLB,, | Performed by: NURSE PRACTITIONER

## 2023-08-29 RX ORDER — ACETAMINOPHEN 500 MG
1000 TABLET ORAL
Status: COMPLETED | OUTPATIENT
Start: 2023-08-29 | End: 2023-08-29

## 2023-08-29 RX ORDER — PROMETHAZINE HYDROCHLORIDE AND DEXTROMETHORPHAN HYDROBROMIDE 6.25; 15 MG/5ML; MG/5ML
5 SYRUP ORAL EVERY 4 HOURS PRN
Qty: 180 ML | Refills: 0 | Status: SHIPPED | OUTPATIENT
Start: 2023-08-29 | End: 2023-09-08

## 2023-08-29 RX ADMIN — Medication 1000 MG: at 10:08

## 2023-08-29 NOTE — LETTER
August 29, 2023      [facility]  [address] [address]  Phone: [phone]  Fax: [phone]       Patient: Danette Ang   YOB: 2000  Date of Visit: 08/29/2023    To Whom It May Concern:    Sarah Ang  was at [facility] on 08/29/2023. The patient may return to work/school on 8/31/2023 pending still negative (on home covid test.) If you have any questions or concerns, or if I can be of further assistance, please do not hesitate to contact me.    Sincerely,    Rachel Royal, IN[employer]

## 2023-08-29 NOTE — PROGRESS NOTES
Subjective:      Patient ID: Danette Ang is a 23 y.o. female.    Vitals:  height is 5' (1.524 m) and weight is 42.6 kg (94 lb). Her temperature is 98.3 °F (36.8 °C). Her blood pressure is 130/90 (abnormal) and her pulse is 81. Her respiration is 19 and oxygen saturation is 98%.     Chief Complaint: Cough    Pt present with symptoms of- Cough, Sore throat, Headache X 3 days. Mom has tested positive for COVID.     Cough  This is a new problem. The current episode started in the past 7 days. The problem has been gradually worsening. The problem occurs hourly. The cough is Non-productive. Associated symptoms include headaches and a sore throat. Pertinent negatives include no fever or nasal congestion. Nothing aggravates the symptoms. Treatments tried: Flonase, Musinex, Zyrtec, Nsaids. The treatment provided mild relief. There is no history of asthma, bronchitis or COPD.       Constitution: Negative for fever.   HENT:  Positive for sore throat.    Respiratory:  Positive for cough.    Neurological:  Positive for headaches.      Objective:     Physical Exam   Constitutional: She is oriented to person, place, and time.   HENT:   Head: Normocephalic.   Ears:   Right Ear: Hearing and external ear normal. No no drainage, swelling or tenderness. Tympanic membrane is not erythematous, not retracted and not bulging. No middle ear effusion.   Left Ear: Hearing and external ear normal. No no drainage, swelling or tenderness. Tympanic membrane is not erythematous, not retracted and not bulging.  No middle ear effusion.   Nose: Nose normal. No mucosal edema, rhinorrhea or purulent discharge. Right sinus exhibits no maxillary sinus tenderness and no frontal sinus tenderness. Left sinus exhibits no maxillary sinus tenderness and no frontal sinus tenderness.   Mouth/Throat: Uvula is midline and mucous membranes are normal. No trismus in the jaw. No uvula swelling. Posterior oropharyngeal erythema present. No oropharyngeal exudate or  "posterior oropharyngeal edema.   Cardiovascular: Normal rate and regular rhythm.   Pulmonary/Chest: Effort normal and breath sounds normal.   Neurological: She is alert and oriented to person, place, and time.   Skin: Skin is warm and dry.   Nursing note and vitals reviewed.      Assessment:     1. Cough, unspecified type        Plan:       Cough, unspecified type  -     SARS Coronavirus 2 Antigen, POCT Manual Read  -     POCT Influenza A/B MOLECULAR  -     POCT Strep A, Molecular  -     acetaminophen tablet 1,000 mg  -     promethazine-dextromethorphan (PROMETHAZINE-DM) 6.25-15 mg/5 mL Syrp; Take 5 mLs by mouth every 4 (four) hours as needed (cough).  Dispense: 180 mL; Refill: 0      Patient Instructions   Cough, unspecified type  -     SARS Coronavirus 2 Antigen, POCT Manual Read  -     POCT Influenza A/B MOLECULAR  -     POCT Strep A, Molecular      You have tested negative for COVID today.  The recommendation is if you have Covid-like symptoms within the first 7 days of symptom onset and test negative today to test again with at least  in 48 hours between each test.       - Rest at home.     - Drink plenty of fluids so you won't get dehydrated.    - Fever/Pain recommendations:      -     acetaminophen tablet 1,000 mg  - given in clinic @ 10:40 am     Alternate Tylenol or Ibuprofen as directed for fever/pain.   Take ibuprofen 600-800 mg every 6-8 hours for pain and inflammation. Do not take ibuprofen if you have a history of GI bleeding, kidney disease, or if you take blood thinners.    Tylenol/acetaminophen 650-1000 mg every 6-8 hours for added pain relief.  Avoid tylenol if you have a history of liver disease.       - you can take plain Mucinex (guaifenesin) twice a day (or as directed) to help loosen mucous.     Avoid taking Decongestants such as Sudafed, pseudoephedrine, phenylephrine or meds that say "cold," "sinus" or "-D".        - Cough recommendations:      NIGHTTIME:  -     promethazine-dextromethorphan " (PROMETHAZINE-DM) 6.25-15 mg/5 mL Syrp; Take 5 mLs by mouth every 4 (four) hours as needed (cough).  Dispense: 180 mL; Refill: 0      OR    DAYTIME:   Dextromethorphan (DM) is a cough suppressant over the counter (ie. mucinex DM, robitussin, delsym; dayquil/nyquil has DM as well.).      Warm tea with honey can help with cough. Honey is a natural cough suppressant.    -Sore throat recommendations: Warm fluids, warm salt water gargles, throat lozenges, tea, honey, soup, or drinking something cold or frozen.  Throat lozenges or sprays help reduce pain. Gargling with warm saltwater (1/4 teaspoon of salt in 1/2 cup of warm water) or an OTC anesthetic gargle may be useful for irritation.      When to seek medical advice  Call your healthcare provider right away if any of these occur:  Fever that is poorly controlled with OTC fever reducing medication  New or worsening ear pain, sinus pain, or headache  Stiff neck  You can't swallow liquids or you can't open your mouth wide because of throat pain  Signs of dehydration. These include very dark urine or no urine, sunken eyes, and dizziness.  Trouble breathing or noisy breathing  Muffled voice  Rash     If your symptoms worsen or fail to improve you should go to Emergency Department.

## 2023-08-29 NOTE — PATIENT INSTRUCTIONS
"Cough, unspecified type  -     SARS Coronavirus 2 Antigen, POCT Manual Read  -     POCT Influenza A/B MOLECULAR  -     POCT Strep A, Molecular      You have tested negative for COVID today.  The recommendation is if you have Covid-like symptoms within the first 7 days of symptom onset and test negative today to test again with at least  in 48 hours between each test.       - Rest at home.     - Drink plenty of fluids so you won't get dehydrated.    - Fever/Pain recommendations:      -     acetaminophen tablet 1,000 mg  - given in clinic @ 10:40 am     Alternate Tylenol or Ibuprofen as directed for fever/pain.   Take ibuprofen 600-800 mg every 6-8 hours for pain and inflammation. Do not take ibuprofen if you have a history of GI bleeding, kidney disease, or if you take blood thinners.    Tylenol/acetaminophen 650-1000 mg every 6-8 hours for added pain relief.  Avoid tylenol if you have a history of liver disease.       - you can take plain Mucinex (guaifenesin) twice a day (or as directed) to help loosen mucous.     Avoid taking Decongestants such as Sudafed, pseudoephedrine, phenylephrine or meds that say "cold," "sinus" or "-D".        - Cough recommendations:      NIGHTTIME:  -     promethazine-dextromethorphan (PROMETHAZINE-DM) 6.25-15 mg/5 mL Syrp; Take 5 mLs by mouth every 4 (four) hours as needed (cough).  Dispense: 180 mL; Refill: 0      OR    DAYTIME:   Dextromethorphan (DM) is a cough suppressant over the counter (ie. mucinex DM, robitussin, delsym; dayquil/nyquil has DM as well.).      Warm tea with honey can help with cough. Honey is a natural cough suppressant.    -Sore throat recommendations: Warm fluids, warm salt water gargles, throat lozenges, tea, honey, soup, or drinking something cold or frozen.  Throat lozenges or sprays help reduce pain. Gargling with warm saltwater (1/4 teaspoon of salt in 1/2 cup of warm water) or an OTC anesthetic gargle may be useful for irritation.      When to seek medical " advice  Call your healthcare provider right away if any of these occur:  Fever that is poorly controlled with OTC fever reducing medication  New or worsening ear pain, sinus pain, or headache  Stiff neck  You can't swallow liquids or you can't open your mouth wide because of throat pain  Signs of dehydration. These include very dark urine or no urine, sunken eyes, and dizziness.  Trouble breathing or noisy breathing  Muffled voice  Rash     If your symptoms worsen or fail to improve you should go to Emergency Department.

## 2023-08-29 NOTE — LETTER
August 29, 2023      [facility]  [address] [address]  Phone: [phone]  Fax: [phone]       Patient: Danette Ang   YOB: 2000  Date of Visit: 08/29/2023    To Whom It May Concern:    Sarah Ang  was at [facility] on 08/29/2023. The patient may return to work/school on 8/31/2023 with no restrictions. If you have any questions or concerns, or if I can be of further assistance, please do not hesitate to contact me.    Sincerely,    INÉS FernándezC

## 2023-09-17 NOTE — PROGRESS NOTES
Lab Documentation:    Order Type: Written Order placed in [facility]    Patient in for lab visit only per provider treatment plan.

## 2023-09-21 ENCOUNTER — TELEPHONE (OUTPATIENT)
Dept: INTERNAL MEDICINE | Facility: CLINIC | Age: 23
End: 2023-09-21
Payer: MEDICAID

## 2023-09-21 NOTE — TELEPHONE ENCOUNTER
----- Message from Consuelo Medley sent at 9/21/2023  1:43 PM CDT -----  Contact: self [phone]  Pt requesting a call for appt.    Please call and advise

## 2023-09-22 ENCOUNTER — TELEPHONE (OUTPATIENT)
Dept: INTERNAL MEDICINE | Facility: CLINIC | Age: 23
End: 2023-09-22
Payer: MEDICAID

## 2023-09-22 NOTE — TELEPHONE ENCOUNTER
----- Message from Meche Childers sent at 9/22/2023  1:22 PM CDT -----  Contact: [phone]  Patient is returning a phone call.  Who left a message for the patient: Annelise Zabala MA  Does patient know what this is regarding:    Would you like a call back, or a response through your [facility] portal?:   call back  Comments:

## 2023-09-25 ENCOUNTER — PATIENT MESSAGE (OUTPATIENT)
Dept: NEUROLOGY | Facility: CLINIC | Age: 23
End: 2023-09-25
Payer: MEDICAID

## 2023-10-26 ENCOUNTER — OFFICE VISIT (OUTPATIENT)
Dept: OBSTETRICS AND GYNECOLOGY | Facility: CLINIC | Age: 23
End: 2023-10-26
Attending: OBSTETRICS & GYNECOLOGY
Payer: MEDICAID

## 2023-10-26 VITALS
BODY MASS INDEX: 18.17 KG/M2 | HEIGHT: 60 IN | DIASTOLIC BLOOD PRESSURE: 76 MMHG | SYSTOLIC BLOOD PRESSURE: 122 MMHG | WEIGHT: 92.56 LBS

## 2023-10-26 DIAGNOSIS — Z00.00 NORMAL EXAM: Primary | ICD-10-CM

## 2023-10-26 PROCEDURE — 1160F RVW MEDS BY RX/DR IN RCRD: CPT | Mod: CPTII,,, | Performed by: OBSTETRICS & GYNECOLOGY

## 2023-10-26 PROCEDURE — 99999 PR PBB SHADOW E&M-EST. PATIENT-LVL III: CPT | Mod: PBBFAC,,, | Performed by: OBSTETRICS & GYNECOLOGY

## 2023-10-26 PROCEDURE — 99213 OFFICE O/P EST LOW 20 MIN: CPT | Mod: S$PBB,,, | Performed by: OBSTETRICS & GYNECOLOGY

## 2023-10-26 PROCEDURE — 99999 PR PBB SHADOW E&M-EST. PATIENT-LVL III: ICD-10-PCS | Mod: PBBFAC,,, | Performed by: OBSTETRICS & GYNECOLOGY

## 2023-10-26 PROCEDURE — 3078F DIAST BP <80 MM HG: CPT | Mod: CPTII,,, | Performed by: OBSTETRICS & GYNECOLOGY

## 2023-10-26 PROCEDURE — 3078F PR MOST RECENT DIASTOLIC BLOOD PRESSURE < 80 MM HG: ICD-10-PCS | Mod: CPTII,,, | Performed by: OBSTETRICS & GYNECOLOGY

## 2023-10-26 PROCEDURE — 3074F PR MOST RECENT SYSTOLIC BLOOD PRESSURE < 130 MM HG: ICD-10-PCS | Mod: CPTII,,, | Performed by: OBSTETRICS & GYNECOLOGY

## 2023-10-26 PROCEDURE — 1159F MED LIST DOCD IN RCRD: CPT | Mod: CPTII,,, | Performed by: OBSTETRICS & GYNECOLOGY

## 2023-10-26 PROCEDURE — 3074F SYST BP LT 130 MM HG: CPT | Mod: CPTII,,, | Performed by: OBSTETRICS & GYNECOLOGY

## 2023-10-26 PROCEDURE — 99213 PR OFFICE/OUTPT VISIT, EST, LEVL III, 20-29 MIN: ICD-10-PCS | Mod: S$PBB,,, | Performed by: OBSTETRICS & GYNECOLOGY

## 2023-10-26 PROCEDURE — 3008F BODY MASS INDEX DOCD: CPT | Mod: CPTII,,, | Performed by: OBSTETRICS & GYNECOLOGY

## 2023-10-26 PROCEDURE — 99213 OFFICE O/P EST LOW 20 MIN: CPT | Mod: PBBFAC,PN | Performed by: OBSTETRICS & GYNECOLOGY

## 2023-10-26 PROCEDURE — 3008F PR BODY MASS INDEX (BMI) DOCUMENTED: ICD-10-PCS | Mod: CPTII,,, | Performed by: OBSTETRICS & GYNECOLOGY

## 2023-10-26 PROCEDURE — 1160F PR REVIEW ALL MEDS BY PRESCRIBER/CLIN PHARMACIST DOCUMENTED: ICD-10-PCS | Mod: CPTII,,, | Performed by: OBSTETRICS & GYNECOLOGY

## 2023-10-26 PROCEDURE — 1159F PR MEDICATION LIST DOCUMENTED IN MEDICAL RECORD: ICD-10-PCS | Mod: CPTII,,, | Performed by: OBSTETRICS & GYNECOLOGY

## 2023-10-26 RX ORDER — MODAFINIL 100 MG/1
100 TABLET ORAL
COMMUNITY
Start: 2023-09-21

## 2023-10-26 NOTE — PROGRESS NOTES
SUBJECTIVE:   23 y.o. female  complains of not having a discharge.  She denies itching or odor.  She denies painful intercourse.  She is taking her micronor and only has dark bleeding for a cycle.  She does have some abdominal discomfort and nausea.  Patient's last menstrual period was 10/01/2023...    ROS:  GENERAL: No fever, chills, fatigability or weight loss.  VULVAR: No pain, no lesions and no itching.  VAGINAL: No relaxation, no itching, no discharge, no abnormal bleeding and no lesions.  ABDOMEN: No abdominal pain. Denies nausea. Denies vomiting. No diarrhea. No constipation  BREAST: Denies pain. No lumps. No discharge.  URINARY: No incontinence, no nocturia, no frequency and no dysuria.  CARDIOVASCULAR: No chest pain. No shortness of breath. No leg cramps.  NEUROLOGICAL: No headaches. No vision changes.        Vitals:    10/26/23 0955   BP: 122/76         OBJECTIVE:   She appears well, afebrile.  Abdomen: benign, soft, nontender, no masses.  VULVA: Normal external female genitalia, normal urethra, normal urethral meatus  VAGINA:no lesions  CERVIXNormal  UTERUSnormal size, contour, position, consistency, mobility, non-tender  ADNEXAnormal adnexa and no mass, fullness, tenderness      ASSESSMENT:   Danette MUNOZ was seen today for vaginal discharge.    Diagnoses and all orders for this visit:    Normal exam    Other orders  -     Cancel: Vaginosis Screen by DNA Probe

## 2023-11-30 ENCOUNTER — PATIENT MESSAGE (OUTPATIENT)
Dept: NEUROLOGY | Facility: CLINIC | Age: 23
End: 2023-11-30
Payer: MEDICAID

## 2024-01-01 DIAGNOSIS — Z30.09 ENCOUNTER FOR OTHER GENERAL COUNSELING OR ADVICE ON CONTRACEPTION: ICD-10-CM

## 2024-01-02 RX ORDER — ACETAMINOPHEN AND CODEINE PHOSPHATE 120; 12 MG/5ML; MG/5ML
1 SOLUTION ORAL DAILY
Qty: 28 TABLET | Refills: 12 | Status: SHIPPED | OUTPATIENT
Start: 2024-01-02 | End: 2024-12-31

## 2024-01-22 ENCOUNTER — TELEPHONE (OUTPATIENT)
Dept: NEUROLOGY | Facility: CLINIC | Age: 24
End: 2024-01-22
Payer: MEDICAID

## 2024-01-22 NOTE — TELEPHONE ENCOUNTER
Spoke to Pt. Pt will be scheduled for a virtual visit on March 12. Pt is aware this the soonest appt since provider is on maternity leave and will return in March.     ----- Message from Renuka Anne sent at 1/22/2024  3:34 PM CST -----  Regarding: Appt Access  Contact: [phone]  CONSULT/ADVISORY    Name of Caller:  TEO KILPATRICK [[id number]]    Contact Preference:   [phone]    Nature of Call: EP requesting her 6 month f/u for 02/2024.  Next avail is 04/08/2024, pt declined.  Pt is requesting a virtual visit.  Please call.

## 2024-02-05 NOTE — PROGRESS NOTES
Past medical, surgical, social, family, and obstetric histories; medications; prior records and results; and available outside records were reviewed and updated in the EMR.  Pertinent findings were noted below.    Reason for Visit   Vaginal Bleeding and Cramping    HPI   23 y.o. female     New patient: No    Menopausal: No    History of abnormal paps: DENIES  Abnormal or postmenopausal bleeding:  Yes, has been bleeding x1 month , normally has light/no cycle with daily POPs  History of abnormal mammograms:N/A   Family history of breast or ovarian cancer:  maternal great grandmother-breast cancer, mom currently getting breast biopsy  Any breast masses, pain, skin changes, or nipple discharge: DENIES  Possible recent STD exposure: SA with males only, no new partners, desires vaginal testing  Contraception: POP    Estrogen causes nausea    Pap: 2021, NILM  Mammogram: N/A  Allergies: Patient has no known allergies.    Review of Systems   Constitutional:  Negative for chills and fever.   Eyes:  Negative for visual disturbance.   Respiratory:  Negative for cough and shortness of breath.    Cardiovascular:  Negative for chest pain and leg swelling.   Gastrointestinal:  Negative for abdominal pain, nausea and vomiting.   Genitourinary:  Positive for menstrual problem. Negative for dysuria, flank pain, frequency, urgency, vaginal bleeding, vaginal discharge and vaginal odor.   Integumentary:  Negative for breast mass.   Neurological:  Negative for syncope and headaches.   Psychiatric/Behavioral:  Negative for depression. The patient is not nervous/anxious.    All other systems reviewed and are negative.  Breast: Negative for mass and mastodynia      Exam   BP (!) 131/96 (BP Location: Right arm, Patient Position: Sitting, BP Method: Medium (Automatic))   Pulse 97   Ht 5' (1.524 m)   Wt 40.7 kg (89 lb 11.6 oz)   LMP 2024   BMI 17.52 kg/m²     Physical Exam  Constitutional:       General: She is not in  acute distress.     Appearance: Normal appearance. She is not ill-appearing.     Genitourinary:    Vulva, uterus, right adnexa and left adnexa normal.   There is bleeding in the vagina. No vaginal discharge in the vagina. Cervix is normal. Breasts:     Breasts are soft.     Right: No mass, nipple discharge, skin change or tenderness.      Left: No mass, nipple discharge, skin change or tenderness.   HENT:      Head: Normocephalic and atraumatic.   Pulmonary:      Effort: Pulmonary effort is normal.   Musculoskeletal:         General: Normal range of motion.   Lymphadenopathy:      Upper Body:      Right upper body: No axillary adenopathy.      Left upper body: No axillary adenopathy.   Neurological:      General: No focal deficit present.      Mental Status: She is alert and oriented to person, place, and time.   Psychiatric:         Mood and Affect: Mood normal.         Behavior: Behavior normal.         Thought Content: Thought content normal.         Judgment: Judgment normal.   Vitals reviewed.       Assessment and Plan   Breakthrough bleeding on birth control pills  -     estrogens, conjugated, (PREMARIN) 1.25 MG tablet; Take 1 tablet (1.25 mg total) by mouth once daily. for 7 days  Dispense: 7 tablet; Refill: 0    Well woman exam with routine gynecological exam    Cervical cancer screening  -     Liquid-Based Pap Smear, Screening    Screening for STD (sexually transmitted disease)  -     Vaginosis Screen by DNA Probe  -     C. trachomatis/N. gonorrhoeae by AMP DNA [facility]; Cervicovaginal    Dysmenorrhea  -     ibuprofen (ADVIL,MOTRIN) 800 MG tablet; Take 1 tablet (800 mg total) by mouth every 8 (eight) hours as needed for Pain.  Dispense: 20 tablet; Refill: 2    Nausea  -     promethazine (PHENERGAN) 12.5 MG Tab; Take 1 tablet (12.5 mg total) by mouth every 4 (four) hours as needed (Nausea and vomiting). Take 30 minutes to 1 hour prior to estrogen pill  Dispense: 30 tablet; Refill: 2      Annual exam  Breast  and pelvic exam: performed today, WNL  Patient was counseled on ASCCP guidelines for cervical cytology screening  Cervical screening: cytology sent today  Patient was counseled on current recommendations for breast cancer screening  Mammogram screening: @ 41yo unless new risk factors warrant earlier screening  VitD/Ca supplementation recommendations based on age:  <50yoa - Ca 1000mg/day, VitD 600IU/day  51-70yoa - Ca 1200mg/day, VitD 600IU/day  >71yoa - Ca 1200mg/day, VitD 800IU/day  STD testing: desired vaginal swabs, unable to perform wet prep due to menstrual blood, affirm sent for trichomonas  Contraception: POPs - discussed BTB with progesterone only methods and likely need for short course of estrogen (Rx 7 days provided with anti-emetic to take prior to pill). Patient to let us know if bleeding doesn't resolve.    She was counseled to follow up with her PCP for other routine health maintenance

## 2024-02-12 ENCOUNTER — OFFICE VISIT (OUTPATIENT)
Dept: OBSTETRICS AND GYNECOLOGY | Facility: CLINIC | Age: 24
End: 2024-02-12
Payer: MEDICAID

## 2024-02-12 VITALS
BODY MASS INDEX: 17.62 KG/M2 | DIASTOLIC BLOOD PRESSURE: 96 MMHG | WEIGHT: 89.75 LBS | SYSTOLIC BLOOD PRESSURE: 131 MMHG | HEIGHT: 60 IN | HEART RATE: 97 BPM

## 2024-02-12 DIAGNOSIS — N92.1 BREAKTHROUGH BLEEDING ON BIRTH CONTROL PILLS: Primary | ICD-10-CM

## 2024-02-12 DIAGNOSIS — Z01.419 WELL WOMAN EXAM WITH ROUTINE GYNECOLOGICAL EXAM: ICD-10-CM

## 2024-02-12 DIAGNOSIS — Z11.3 SCREENING FOR STD (SEXUALLY TRANSMITTED DISEASE): ICD-10-CM

## 2024-02-12 DIAGNOSIS — Z12.4 CERVICAL CANCER SCREENING: ICD-10-CM

## 2024-02-12 DIAGNOSIS — N94.6 DYSMENORRHEA: ICD-10-CM

## 2024-02-12 DIAGNOSIS — R11.0 NAUSEA: ICD-10-CM

## 2024-02-12 PROCEDURE — 99999 PR PBB SHADOW E&M-EST. PATIENT-LVL III: CPT | Mod: PBBFAC,,, | Performed by: OBSTETRICS & GYNECOLOGY

## 2024-02-12 PROCEDURE — 99213 OFFICE O/P EST LOW 20 MIN: CPT | Mod: PBBFAC,PN | Performed by: OBSTETRICS & GYNECOLOGY

## 2024-02-12 PROCEDURE — 3075F SYST BP GE 130 - 139MM HG: CPT | Mod: CPTII,,, | Performed by: OBSTETRICS & GYNECOLOGY

## 2024-02-12 PROCEDURE — 3008F BODY MASS INDEX DOCD: CPT | Mod: CPTII,,, | Performed by: OBSTETRICS & GYNECOLOGY

## 2024-02-12 PROCEDURE — 81514 NFCT DS BV&VAGINITIS DNA ALG: CPT | Performed by: OBSTETRICS & GYNECOLOGY

## 2024-02-12 PROCEDURE — 3080F DIAST BP >= 90 MM HG: CPT | Mod: CPTII,,, | Performed by: OBSTETRICS & GYNECOLOGY

## 2024-02-12 PROCEDURE — 88175 CYTOPATH C/V AUTO FLUID REDO: CPT | Performed by: OBSTETRICS & GYNECOLOGY

## 2024-02-12 PROCEDURE — 87491 CHLMYD TRACH DNA AMP PROBE: CPT | Performed by: OBSTETRICS & GYNECOLOGY

## 2024-02-12 PROCEDURE — 1159F MED LIST DOCD IN RCRD: CPT | Mod: CPTII,,, | Performed by: OBSTETRICS & GYNECOLOGY

## 2024-02-12 PROCEDURE — 99395 PREV VISIT EST AGE 18-39: CPT | Mod: S$PBB,,, | Performed by: OBSTETRICS & GYNECOLOGY

## 2024-02-12 RX ORDER — IBUPROFEN 800 MG/1
800 TABLET ORAL EVERY 8 HOURS PRN
Qty: 20 TABLET | Refills: 2 | Status: SHIPPED | OUTPATIENT
Start: 2024-02-12 | End: 2025-02-11

## 2024-02-12 RX ORDER — PROMETHAZINE HYDROCHLORIDE 12.5 MG/1
12.5 TABLET ORAL EVERY 4 HOURS PRN
Qty: 30 TABLET | Refills: 2 | Status: SHIPPED | OUTPATIENT
Start: 2024-02-12

## 2024-02-13 DIAGNOSIS — B96.89 BV (BACTERIAL VAGINOSIS): Primary | ICD-10-CM

## 2024-02-13 DIAGNOSIS — N76.0 BV (BACTERIAL VAGINOSIS): Primary | ICD-10-CM

## 2024-02-13 RX ORDER — METRONIDAZOLE 500 MG/1
500 TABLET ORAL EVERY 12 HOURS
Qty: 14 TABLET | Refills: 0 | Status: SHIPPED | OUTPATIENT
Start: 2024-02-13 | End: 2024-02-20

## 2024-02-14 LAB
C TRACH DNA SPEC QL NAA+PROBE: NOT DETECTED
N GONORRHOEA DNA SPEC QL NAA+PROBE: NOT DETECTED

## 2024-02-15 LAB
CLINICAL INFO: NORMAL
CYTO CVX: NORMAL
CYTOLOGIST CVX/VAG CYTO: NORMAL
CYTOLOGIST CVX/VAG CYTO: NORMAL
CYTOLOGY CMNT CVX/VAG CYTO-IMP: NORMAL
CYTOLOGY PAP THIN PREP EXPLANATION: NORMAL
DATE OF PREVIOUS PAP: NO
DATE PREVIOUS BX: NO
GEN CATEG CVX/VAG CYTO-IMP: NORMAL
LMP START DATE: NORMAL
MICROORGANISM CVX/VAG CYTO: NORMAL
PATHOLOGIST CVX/VAG CYTO: NORMAL
SERVICE CMNT-IMP: NORMAL
SPECIMEN SOURCE CVX/VAG CYTO: NORMAL
STAT OF ADQ CVX/VAG CYTO-IMP: NORMAL

## 2024-03-12 ENCOUNTER — TELEPHONE (OUTPATIENT)
Dept: NEUROLOGY | Facility: CLINIC | Age: 24
End: 2024-03-12

## 2024-03-12 NOTE — TELEPHONE ENCOUNTER
Spoke to Pt about rescheduling her virtual visit today since she logged in passed the 15 min daniel period. Pt will be rescheduled for a virtual visit on April 23.

## 2024-05-26 ENCOUNTER — HOSPITAL ENCOUNTER (EMERGENCY)
Facility: HOSPITAL | Age: 24
Discharge: HOME | End: 2024-05-26
Attending: EMERGENCY MEDICINE

## 2024-05-26 VITALS
DIASTOLIC BLOOD PRESSURE: 72 MMHG | OXYGEN SATURATION: 99 % | RESPIRATION RATE: 18 BRPM | TEMPERATURE: 98 F | BODY MASS INDEX: 17.52 KG/M2 | HEART RATE: 81 BPM | WEIGHT: 89.69 LBS | SYSTOLIC BLOOD PRESSURE: 128 MMHG

## 2024-05-26 DIAGNOSIS — K29.70 GASTRITIS, PRESENCE OF BLEEDING UNSPECIFIED, UNSPECIFIED CHRONICITY, UNSPECIFIED GASTRITIS TYPE: Primary | ICD-10-CM

## 2024-05-26 DIAGNOSIS — R11.2 NAUSEA AND VOMITING, UNSPECIFIED VOMITING TYPE: ICD-10-CM

## 2024-05-26 LAB
ALBUMIN SERPL BCP-MCNC: 4.3 G/DL (ref 3.5–5.2)
ALP SERPL-CCNC: 57 U/L (ref 55–135)
ALT SERPL W/O P-5'-P-CCNC: 8 U/L (ref 10–44)
ANION GAP SERPL CALC-SCNC: 10 MMOL/L (ref 8–16)
AST SERPL-CCNC: 20 U/L (ref 10–40)
B-HCG UR QL: NEGATIVE
BASOPHILS # BLD AUTO: 0.03 K/UL (ref 0–0.2)
BASOPHILS NFR BLD: 0.6 % (ref 0–1.9)
BILIRUB SERPL-MCNC: 1.3 MG/DL (ref 0.1–1)
BILIRUB UR QL STRIP: NEGATIVE
BUN SERPL-MCNC: 12 MG/DL (ref 6–20)
CALCIUM SERPL-MCNC: 10.3 MG/DL (ref 8.7–10.5)
CHLORIDE SERPL-SCNC: 104 MMOL/L (ref 95–110)
CLARITY UR REFRACT.AUTO: CLEAR
CO2 SERPL-SCNC: 25 MMOL/L (ref 23–29)
COLOR UR AUTO: YELLOW
CREAT SERPL-MCNC: 0.9 MG/DL (ref 0.5–1.4)
CTP QC/QA: YES
DIFFERENTIAL METHOD BLD: ABNORMAL
EOSINOPHIL # BLD AUTO: 0.1 K/UL (ref 0–0.5)
EOSINOPHIL NFR BLD: 1.5 % (ref 0–8)
ERYTHROCYTE [DISTWIDTH] IN BLOOD BY AUTOMATED COUNT: 11.5 % (ref 11.5–14.5)
EST. GFR  (NO RACE VARIABLE): >60 ML/MIN/1.73 M^2
GLUCOSE SERPL-MCNC: 76 MG/DL (ref 70–110)
GLUCOSE UR QL STRIP: NEGATIVE
HCT VFR BLD AUTO: 43.1 % (ref 37–48.5)
HCV AB SERPL QL IA: NORMAL
HGB BLD-MCNC: 14.4 G/DL (ref 12–16)
HGB UR QL STRIP: ABNORMAL
HIV 1+2 AB+HIV1 P24 AG SERPL QL IA: NORMAL
IMM GRANULOCYTES # BLD AUTO: 0.01 K/UL (ref 0–0.04)
IMM GRANULOCYTES NFR BLD AUTO: 0.2 % (ref 0–0.5)
KETONES UR QL STRIP: NEGATIVE
LEUKOCYTE ESTERASE UR QL STRIP: NEGATIVE
LIPASE SERPL-CCNC: 25 U/L (ref 4–60)
LYMPHOCYTES # BLD AUTO: 2.7 K/UL (ref 1–4.8)
LYMPHOCYTES NFR BLD: 50.2 % (ref 18–48)
MAGNESIUM SERPL-MCNC: 1.8 MG/DL (ref 1.6–2.6)
MCH RBC QN AUTO: 31.2 PG (ref 27–31)
MCHC RBC AUTO-ENTMCNC: 33.4 G/DL (ref 32–36)
MCV RBC AUTO: 93 FL (ref 82–98)
MONOCYTES # BLD AUTO: 0.4 K/UL (ref 0.3–1)
MONOCYTES NFR BLD: 6.8 % (ref 4–15)
NEUTROPHILS # BLD AUTO: 2.2 K/UL (ref 1.8–7.7)
NEUTROPHILS NFR BLD: 40.7 % (ref 38–73)
NITRITE UR QL STRIP: NEGATIVE
NRBC BLD-RTO: 0 /100 WBC
PH UR STRIP: 6 [PH] (ref 5–8)
PLATELET # BLD AUTO: 147 K/UL (ref 150–450)
PMV BLD AUTO: 12.5 FL (ref 9.2–12.9)
POTASSIUM SERPL-SCNC: 3.7 MMOL/L (ref 3.5–5.1)
PROT SERPL-MCNC: 8 G/DL (ref 6–8.4)
PROT UR QL STRIP: NEGATIVE
RBC # BLD AUTO: 4.62 M/UL (ref 4–5.4)
SODIUM SERPL-SCNC: 139 MMOL/L (ref 136–145)
SP GR UR STRIP: 1.01 (ref 1–1.03)
URN SPEC COLLECT METH UR: ABNORMAL
WBC # BLD AUTO: 5.3 K/UL (ref 3.9–12.7)

## 2024-05-26 PROCEDURE — 25000003 PHARM REV CODE 250: Performed by: EMERGENCY MEDICINE

## 2024-05-26 PROCEDURE — 25500020 PHARM REV CODE 255: Performed by: EMERGENCY MEDICINE

## 2024-05-26 PROCEDURE — 81025 URINE PREGNANCY TEST: CPT | Performed by: EMERGENCY MEDICINE

## 2024-05-26 PROCEDURE — 96361 HYDRATE IV INFUSION ADD-ON: CPT

## 2024-05-26 PROCEDURE — 83690 ASSAY OF LIPASE: CPT | Performed by: EMERGENCY MEDICINE

## 2024-05-26 PROCEDURE — 80053 COMPREHEN METABOLIC PANEL: CPT | Performed by: EMERGENCY MEDICINE

## 2024-05-26 PROCEDURE — 85025 COMPLETE CBC W/AUTO DIFF WBC: CPT | Performed by: EMERGENCY MEDICINE

## 2024-05-26 PROCEDURE — 99285 EMERGENCY DEPT VISIT HI MDM: CPT | Mod: 25

## 2024-05-26 PROCEDURE — 63600175 PHARM REV CODE 636 W HCPCS: Performed by: EMERGENCY MEDICINE

## 2024-05-26 PROCEDURE — 86803 HEPATITIS C AB TEST: CPT | Performed by: EMERGENCY MEDICINE

## 2024-05-26 PROCEDURE — 87389 HIV-1 AG W/HIV-1&-2 AB AG IA: CPT | Performed by: EMERGENCY MEDICINE

## 2024-05-26 PROCEDURE — 83735 ASSAY OF MAGNESIUM: CPT | Performed by: EMERGENCY MEDICINE

## 2024-05-26 PROCEDURE — 96374 THER/PROPH/DIAG INJ IV PUSH: CPT

## 2024-05-26 PROCEDURE — 81003 URINALYSIS AUTO W/O SCOPE: CPT | Performed by: EMERGENCY MEDICINE

## 2024-05-26 RX ORDER — LIDOCAINE HYDROCHLORIDE 20 MG/ML
15 SOLUTION OROPHARYNGEAL ONCE
Status: COMPLETED | OUTPATIENT
Start: 2024-05-26 | End: 2024-05-26

## 2024-05-26 RX ORDER — ONDANSETRON HYDROCHLORIDE 2 MG/ML
4 INJECTION, SOLUTION INTRAVENOUS
Status: COMPLETED | OUTPATIENT
Start: 2024-05-26 | End: 2024-05-26

## 2024-05-26 RX ORDER — OMEPRAZOLE 20 MG/1
20 CAPSULE, DELAYED RELEASE ORAL DAILY
Qty: 90 CAPSULE | Refills: 3 | Status: SHIPPED | OUTPATIENT
Start: 2024-05-26 | End: 2025-05-26

## 2024-05-26 RX ORDER — ALUMINUM HYDROXIDE, MAGNESIUM HYDROXIDE, AND SIMETHICONE 1200; 120; 1200 MG/30ML; MG/30ML; MG/30ML
30 SUSPENSION ORAL ONCE
Status: COMPLETED | OUTPATIENT
Start: 2024-05-26 | End: 2024-05-26

## 2024-05-26 RX ADMIN — ALUMINUM HYDROXIDE, MAGNESIUM HYDROXIDE, AND SIMETHICONE 30 ML: 1200; 120; 1200 SUSPENSION ORAL at 12:05

## 2024-05-26 RX ADMIN — LIDOCAINE HYDROCHLORIDE 15 ML: 20 SOLUTION ORAL at 12:05

## 2024-05-26 RX ADMIN — SODIUM CHLORIDE 1000 ML: 9 INJECTION, SOLUTION INTRAVENOUS at 11:05

## 2024-05-26 RX ADMIN — IOHEXOL 75 ML: 350 INJECTION, SOLUTION INTRAVENOUS at 12:05

## 2024-05-26 RX ADMIN — ONDANSETRON 4 MG: 2 INJECTION INTRAMUSCULAR; INTRAVENOUS at 11:05

## 2024-05-26 NOTE — DISCHARGE INSTRUCTIONS
You do not have a urinary tract infection.  Do not start the antibiotics given unless you develop symptoms such as burning with urination, blood in urine, urgency or frequency.  Please schedule a follow up appointment with your primary doctor for further workup for likely gastritis.

## 2024-05-26 NOTE — ED NOTES
Patient identifiers verified and correct for Ms Page  C/C: ABd pain , vomiting SEE NN  APPEARANCE: awake and alert in NAD. PAIN  /10  SKIN: warm, dry and intact. No breakdown or bruising.  MUSCULOSKELETAL: Patient moving all extremities spontaneously, no obvious swelling or deformities noted. Ambulates independently.  RESPIRATORY: Denies shortness of breath.Respirations unlabored.   CARDIAC: Denies CP, 2+ distal pulses; no peripheral edema  ABDOMEN: ABdomen  soft, reports shelley  upper abd pain and emesis   : voids spontaneously, denies difficulty  Neurologic: AAO x 4; follows commands equal strength in all extremities; denies numbness/tingling. Denies dizziness Denies new weakness,    none

## 2024-05-26 NOTE — ED PROVIDER NOTES
Encounter Date: 5/26/2024       History     Chief Complaint   Patient presents with    Nausea     Weakness, HA, abd pain, back pain starting Friday. Vomiting. Seen in other ED prior to arrival here and dx with UTI     HPI  23-year-old female with a history of migraines and anxiety who presents with abdominal pain and vomiting.  Started Friday.  Has not been able to keep any food down.  Reports her emesis is clear.  No blood.  Is having normal nonbloody bowel movements.  No history of intra-abdominal surgeries.  Initially pointed to epigastric area where she was having abdominal pain but then points to the periumbilical area.  No radiation.  No fevers.  No marijuana or alcohol use.  States that she went to another emergency department earlier today but not much was done.  Told she might have a UTI after the fact but has not started on antibiotics.  Denies any urinary symptoms such as urgency, frequency, hematuria, flank pain, dysuria.  No vaginal pain or discharge or bleeding.  Does report she has a history of gallbladder issues.  States that she had an ultrasound done a few months ago being worked up for it but was never told the results.  No recent travel.  No known sick contacts.      Review of patient's allergies indicates:  No Known Allergies  Past Medical History:   Diagnosis Date    Anxiety     Migraine headache     Migraine with aura 08/15/2022    Scoliosis      Past Surgical History:   Procedure Laterality Date    BACK SURGERY  02/20/2018    SPINE SURGERY       Family History   Problem Relation Name Age of Onset    Hypertension Mother Chika smalls     Stroke Mother Chika smalls     Aneurysm Mother Chika smalls     Cancer Maternal Grandmother      Breast cancer Neg Hx      Colon cancer Neg Hx      Ovarian cancer Neg Hx       Social History     Tobacco Use    Smoking status: Never     Passive exposure: Never    Smokeless tobacco: Never   Substance Use Topics    Alcohol use: Never    Drug use: Never     Review  of Systems    Physical Exam     Initial Vitals   BP Pulse Resp Temp SpO2   05/26/24 1030 05/26/24 1026 05/26/24 1026 05/26/24 1026 05/26/24 1030   (!) 134/90 87 18 97.7 °F (36.5 °C) 100 %      MAP       --                Physical Exam    Nursing note and vitals reviewed.  Constitutional: She appears well-developed and well-nourished. No distress.   HENT:   Head: Normocephalic and atraumatic.   Nose: Nose normal.   Eyes: Conjunctivae and EOM are normal. Pupils are equal, round, and reactive to light.   Neck:   Normal range of motion.  Cardiovascular:  Normal rate, regular rhythm and normal heart sounds.     Exam reveals no gallop and no friction rub.       No murmur heard.  Pulmonary/Chest: Breath sounds normal. No respiratory distress. She has no wheezes. She has no rhonchi. She has no rales.   Abdominal: Abdomen is soft.   Tender over periumbilical area.  Mild tenderness over the rest of the abdomen but more focal intense tenderness over the umbilicus.  No CVA tenderness bilaterally.  No rebound tenderness.  Negative Gonzales's sign.   Musculoskeletal:         General: No edema. Normal range of motion.      Cervical back: Normal range of motion.     Neurological: She is alert and oriented to person, place, and time. She has normal strength.   Skin: Skin is warm and dry. Capillary refill takes less than 2 seconds.   Psychiatric: She has a normal mood and affect.         ED Course   Procedures  Labs Reviewed   CBC W/ AUTO DIFFERENTIAL - Abnormal; Notable for the following components:       Result Value    MCH 31.2 (*)     Platelets 147 (*)     Lymph % 50.2 (*)     All other components within normal limits    Narrative:     Release to patient->Immediate   COMPREHENSIVE METABOLIC PANEL - Abnormal; Notable for the following components:    Total Bilirubin 1.3 (*)     ALT 8 (*)     All other components within normal limits    Narrative:     Release to patient->Immediate   URINALYSIS, REFLEX TO URINE CULTURE - Abnormal;  Notable for the following components:    Occult Blood UA Trace (*)     All other components within normal limits    Narrative:     Specimen Source->Urine   HIV 1 / 2 ANTIBODY    Narrative:     Release to patient->Immediate   HEPATITIS C ANTIBODY    Narrative:     Release to patient->Immediate   LIPASE    Narrative:     Release to patient->Immediate   MAGNESIUM    Narrative:     Release to patient->Immediate   POCT URINE PREGNANCY          Imaging Results              CT Abdomen Pelvis With IV Contrast NO Oral Contrast (Final result)  Result time 05/26/24 12:18:28      Final result by Patrick Velasquez MD (05/26/24 12:18:28)                   Impression:      1. Allowing for extensive artifact from spinal stabilization hardware, no acute CT abnormalities to correlate with provided history of right lower quadrant pain.  2. Please see above for several additional findings.      Electronically signed by: Patrick Velasquez MD  Date:    05/26/2024  Time:    12:18               Narrative:    EXAMINATION:  CT ABDOMEN PELVIS WITH IV CONTRAST    CLINICAL HISTORY:  RLQ abdominal pain (Age >= 14y);    TECHNIQUE:  Low dose axial images, sagittal and coronal reformations were obtained from the lung bases to the pubic symphysis following the IV administration of 75 mL of Omnipaque 350 .  Oral contrast was not given.    COMPARISON:  Radiograph 12/06/2020    FINDINGS:  Images of the lower thorax are unremarkable.  Evaluation of the abdomen and pelvis is somewhat limited secondary to artifact from spinal fusion hardware.  Allowing for this, the liver, spleen, pancreas, gallbladder and adrenal glands are grossly unremarkable.  The stomach is mildly distended with ingested content.  Portal vein, splenic vein, SMV, celiac axis and SMA all are patent.  No significant abdominal lymphadenopathy.    The kidneys enhance symmetrically without hydronephrosis or nephrolithiasis.  The bilateral ureters are unable to be followed in their entirety  "to the urinary bladder, no definite calculi seen or secondary findings to suggest obstructive uropathy.  The urinary bladder is distended without wall thickening.  The uterus and adnexa are unremarkable.  No significant free fluid in the pelvis.    The large bowel is grossly unremarkable.  The terminal ileum is unremarkable.  The appendix is not confidently identified, no pericecal inflammation.  The small bowel is grossly unremarkable.  There are a few scattered shotty periaortic, pericaval, and mesenteric lymph nodes.    There is multilevel spinal stabilization hardware, partially visualized.  No significant inguinal lymphadenopathy.                                       Medications   sodium chloride 0.9% bolus 1,000 mL 1,000 mL (0 mLs Intravenous Stopped 5/26/24 1228)   ondansetron injection 4 mg (4 mg Intravenous Given 5/26/24 1111)   iohexoL (OMNIPAQUE 350) injection 75 mL (75 mLs Intravenous Given 5/26/24 1202)   aluminum-magnesium hydroxide-simethicone 200-200-20 mg/5 mL suspension 30 mL (30 mLs Oral Given 5/26/24 1234)     And   LIDOcaine viscous HCl 2% oral solution 15 mL (15 mLs Oral Given 5/26/24 1234)     Medical Decision Making  23-year-old female who presents with 3 days of vomiting and abdominal pain.  Does report that she has been worked up for "gallbladder issues" but I am unable to see anything in her chart.  No pain after eating, really not eating much at all.  She was quite tender around the umbilicus.  Differential includes early appendicitis, gastroenteritis, cholecystitis or symptomatic cholelithiasis, pancreatitis.  We will also repeat her UA though does not have any active symptoms.  We will get labs, CT abdomen/pelvis, give Zofran and fluids.  Disposition pending further workup.    Overall workup is reassuring including CT.  The patient was feeling better.  She was Zofran and Phenergan at home.  She does report that she was supposed to follow up with GI outpatient but was cut off by " Medicaid so has had trouble getting in.  We will start on omeprazole for possible gastritis.  She will follow up with her PCP.  Tolerating p.o..    Amount and/or Complexity of Data Reviewed  Labs: ordered.  Radiology: ordered.    Risk  OTC drugs.  Prescription drug management.                                      Clinical Impression:  Final diagnoses:  [K29.70] Gastritis, presence of bleeding unspecified, unspecified chronicity, unspecified gastritis type (Primary)  [R11.2] Nausea and vomiting, unspecified vomiting type                 Liz Cochran MD  05/26/24 5749

## 2024-06-11 ENCOUNTER — PATIENT MESSAGE (OUTPATIENT)
Dept: NEUROLOGY | Facility: CLINIC | Age: 24
End: 2024-06-11

## 2024-06-11 ENCOUNTER — TELEPHONE (OUTPATIENT)
Dept: NEUROLOGY | Facility: CLINIC | Age: 24
End: 2024-06-11

## 2024-06-11 DIAGNOSIS — G43.009 MIGRAINE WITHOUT AURA AND WITHOUT STATUS MIGRAINOSUS, NOT INTRACTABLE: ICD-10-CM

## 2024-06-11 NOTE — TELEPHONE ENCOUNTER
Spoke to Pt about scheduling a follow up appt. Pt is scheduled for a in person appt on June 26. Pt is still complaining about her headache.      How long have you been experiencing these symptoms?  Any obvious or known triggers?      About 6 days      Where on your head is the pain located, and on a scale of 0-10, how would you rate it at it's worst?    Pain is located behind the eyes and above the eyebrows. Pt states her pain is 7.5 / 10.    Is there anything that helps lessen the pain or make it worse?    Pt was given a Toradol by mom. Pt also took extra strength Tylenol. This helped for a second but the pain returned.     Are you having any other symptoms (nausea, vomiting, sensitivities to light, etc)?     Pt is complaining of nausea, light and noise sensitivity. Pt vomited on Sunday.      What medications, if any, have been taken in attempt to help and have they worked at all?    Maxalt, Nurtec, Toradol, and Extra Strength Tylenol

## 2024-06-11 NOTE — TELEPHONE ENCOUNTER
Spoke to Pt about scheduling a follow up appt. Pt is scheduled for a in person appt on June 26. Pt is still complaining about her headache.      How long have you been experiencing these symptoms?  Any obvious or known triggers?      About 6 days      Where on your head is the pain located, and on a scale of 0-10, how would you rate it at it's worst?    Pain is located behind the eyes and above the eyebrows. Pt states her pain is 7.5 / 10.    Is there anything that helps lessen the pain or make it worse?    Pt was given a Toradol by mom. Pt also took extra strength Tylenol. This helped for a second but the pain returned.     Are you having any other symptoms (nausea, vomiting, sensitivities to light, etc)?     Pt is complaining of nausea, light and noise sensitivity. Pt vomited on Sunday.      What medications, if any, have been taken in attempt to help and have they worked at all?    Maxalt, Nurtec, Toradol, and Extra Strength Tylenol         ----- Message from Mignon Bird sent at 6/11/2024  2:03 PM CDT -----  Regarding: returning call  Contact: [phone]  Who Called:Danette    Who Left Message for Patient:not sure    Does the patient know what this is regarding?:scheduling appt    Would the patient rather a call back or a response via [facility]? Call back    Best Call Back Number: [phone]    Additional Information:

## 2024-06-12 RX ORDER — RIMEGEPANT SULFATE 75 MG/75MG
75 TABLET, ORALLY DISINTEGRATING ORAL DAILY PRN
Qty: 8 TABLET | Refills: 0 | Status: SHIPPED | OUTPATIENT
Start: 2024-06-12

## 2024-06-13 ENCOUNTER — HOSPITAL ENCOUNTER (EMERGENCY)
Facility: HOSPITAL | Age: 24
Discharge: HOME | End: 2024-06-13
Attending: STUDENT IN AN ORGANIZED HEALTH CARE EDUCATION/TRAINING PROGRAM

## 2024-06-13 VITALS
RESPIRATION RATE: 22 BRPM | BODY MASS INDEX: 17.62 KG/M2 | TEMPERATURE: 98 F | HEIGHT: 60 IN | OXYGEN SATURATION: 100 % | SYSTOLIC BLOOD PRESSURE: 148 MMHG | WEIGHT: 89.75 LBS | DIASTOLIC BLOOD PRESSURE: 96 MMHG | HEART RATE: 115 BPM

## 2024-06-13 DIAGNOSIS — R51.9 ACUTE NONINTRACTABLE HEADACHE, UNSPECIFIED HEADACHE TYPE: Primary | ICD-10-CM

## 2024-06-13 LAB
B-HCG UR QL: NEGATIVE
CTP QC/QA: YES

## 2024-06-13 PROCEDURE — 63600175 PHARM REV CODE 636 W HCPCS: Performed by: PHYSICIAN ASSISTANT

## 2024-06-13 PROCEDURE — 96361 HYDRATE IV INFUSION ADD-ON: CPT

## 2024-06-13 PROCEDURE — 25000003 PHARM REV CODE 250: Performed by: PHYSICIAN ASSISTANT

## 2024-06-13 PROCEDURE — 99284 EMERGENCY DEPT VISIT MOD MDM: CPT | Mod: 25

## 2024-06-13 PROCEDURE — 96374 THER/PROPH/DIAG INJ IV PUSH: CPT

## 2024-06-13 PROCEDURE — 81025 URINE PREGNANCY TEST: CPT | Performed by: PHYSICIAN ASSISTANT

## 2024-06-13 PROCEDURE — 96375 TX/PRO/DX INJ NEW DRUG ADDON: CPT

## 2024-06-13 RX ORDER — METOCLOPRAMIDE HYDROCHLORIDE 5 MG/ML
10 INJECTION INTRAMUSCULAR; INTRAVENOUS
Status: COMPLETED | OUTPATIENT
Start: 2024-06-13 | End: 2024-06-13

## 2024-06-13 RX ORDER — KETOROLAC TROMETHAMINE 30 MG/ML
10 INJECTION, SOLUTION INTRAMUSCULAR; INTRAVENOUS
Status: COMPLETED | OUTPATIENT
Start: 2024-06-13 | End: 2024-06-13

## 2024-06-13 RX ORDER — DIPHENHYDRAMINE HYDROCHLORIDE 50 MG/ML
12.5 INJECTION INTRAMUSCULAR; INTRAVENOUS
Status: DISCONTINUED | OUTPATIENT
Start: 2024-06-13 | End: 2024-06-13 | Stop reason: HOSPADM

## 2024-06-13 RX ADMIN — METOCLOPRAMIDE 10 MG: 5 INJECTION, SOLUTION INTRAMUSCULAR; INTRAVENOUS at 07:06

## 2024-06-13 RX ADMIN — KETOROLAC TROMETHAMINE 10 MG: 30 INJECTION, SOLUTION INTRAMUSCULAR; INTRAVENOUS at 07:06

## 2024-06-13 RX ADMIN — SODIUM CHLORIDE 1000 ML: 9 INJECTION, SOLUTION INTRAVENOUS at 07:06

## 2024-06-13 NOTE — ED TRIAGE NOTES
"Danette Ang, a 23 y.o. female presents to the ED w/ complaint of Migraine, states that she is having vomiting, nausea, and photosensitivity since Sunday, reports taking migraine meds but with no relief.     Triage note:  Chief Complaint   Patient presents with    Headache     "Migraine" with nausea and vomiting since Sunday. Light sensitive     Review of patient's allergies indicates:  No Known Allergies  Past Medical History:   Diagnosis Date    Anxiety     Migraine headache     Migraine with aura 08/15/2022    Scoliosis        APPEARANCE: awake and alert in NAD.  SKIN: warm, dry and intact. No breakdown or bruising.  MUSCULOSKELETAL: Patient moving all extremities spontaneously, no obvious swelling or deformities noted. Ambulates independently.  RESPIRATORY: Denies shortness of breath.Respirations unlabored.   CARDIAC: Denies CP, 2+ distal pulses; no peripheral edema  ABDOMEN: S/ND/NT, Nausea   : voids spontaneously, denies difficulty  Neurologic: AAO x 4; follows commands equal strength in all extremities; denies numbness/tingling. Dizziness and Headache  " n/a

## 2024-06-13 NOTE — ED NOTES
ROSALINDA Hernandez notified of pt's request for head CT, and statements of increased nausea, anxiety, and wishing to leave.

## 2024-06-13 NOTE — Clinical Note
"Danette Matuterosalina" Ashia was seen and treated in our emergency department on 6/13/2024.  She may return to work on 06/18/2024.       If you have any questions or concerns, please don't hesitate to call.      Mary Grande PA-C"

## 2024-06-13 NOTE — ED PROVIDER NOTES
"Encounter Date: 6/13/2024       History     Chief Complaint   Patient presents with    Headache     "Migraine" with nausea and vomiting since Sunday. Light sensitive     23 year old female with history of migraines, anxiety, scoliosis presents for severe headache across her eyes for about 5 days.  She tried taking her home migraine medication, Nurtec without relief.  She reports associated photophobia, blurred vision, nausea and vomiting.  States this is consistent with prior migraines.  She denies numbness, weakness, fever or neck pain.      Review of patient's allergies indicates:   Allergen Reactions    Reglan [metoclopramide] Anxiety     Past Medical History:   Diagnosis Date    Anxiety     Migraine headache     Migraine with aura 08/15/2022    Scoliosis      Past Surgical History:   Procedure Laterality Date    BACK SURGERY  02/20/2018    SPINE SURGERY       Family History   Problem Relation Name Age of Onset    Hypertension Mother Chika smalls     Stroke Mother Chika smalls     Aneurysm Mother Chika smalls     Cancer Maternal Grandmother      Breast cancer Neg Hx      Colon cancer Neg Hx      Ovarian cancer Neg Hx       Social History     Tobacco Use    Smoking status: Never     Passive exposure: Never    Smokeless tobacco: Never   Substance Use Topics    Alcohol use: Never    Drug use: Never     Review of Systems    Physical Exam     Initial Vitals [06/13/24 0643]   BP Pulse Resp Temp SpO2   (!) 129/93 95 14 97.4 °F (36.3 °C) 95 %      MAP       --         Physical Exam    Nursing note and vitals reviewed.  Constitutional: She appears well-developed and well-nourished.   HENT:   Head: Normocephalic and atraumatic.   Eyes: EOM are normal. Pupils are equal, round, and reactive to light.   Neck: Neck supple.   Normal range of motion.  Cardiovascular:  Normal rate, regular rhythm, normal heart sounds and intact distal pulses.     Exam reveals no gallop and no friction rub.       No murmur heard.  Pulmonary/Chest: " Breath sounds normal. No respiratory distress. She has no wheezes. She has no rhonchi. She has no rales. She exhibits no tenderness.   Musculoskeletal:         General: Normal range of motion.      Cervical back: Normal range of motion and neck supple.     Neurological: She is alert and oriented to person, place, and time. She has normal strength. No cranial nerve deficit or sensory deficit.   Skin: Skin is warm and dry.   Psychiatric: She has a normal mood and affect.         ED Course   Procedures  Labs Reviewed   POCT URINE PREGNANCY          Imaging Results    None          Medications   diphenhydrAMINE injection 12.5 mg (12.5 mg Intravenous Not Given 6/13/24 0815)   metoclopramide injection 10 mg (10 mg Intravenous Given 6/13/24 0729)   ketorolac injection 9.999 mg (9.999 mg Intravenous Given 6/13/24 0730)   sodium chloride 0.9% bolus 1,000 mL 1,000 mL (0 mLs Intravenous Stopped 6/13/24 0821)     Medical Decision Making  23-year-old female presenting for headache consistent with prior migraines.  /93 with otherwise normal vitals.  She appears uncomfortable but has a grossly normal neurologic exam.    Differential diagnosis includes migraine, tension headache, dehydration.  Her clinical presentation is not consistent with meningitis or subarachnoid hemorrhage    Will give migraine cocktail, reassess.    Headache resolved with therapy but patient is feeling very anxious.  Suspect secondary to Reglan.  Offered Benadryl but she declines.  Her mother voiced concern about intracranial hemorrhage as she herself had a prior aneurysm.  I discussed with her that I do not think her clinical presentation consistent with this, however did offer to obtain head CT.  The patient declined this imaging.  Will discharge instructions to follow up with her neurologist and return to the ED for worsening symptoms. Stressed the importance of follow-up, strict ED return precautions given.  Patient voiced understanding and is  comfortable with discharge.     Amount and/or Complexity of Data Reviewed  Labs: ordered. Decision-making details documented in ED Course.    Risk  Prescription drug management.               ED Course as of 06/13/24 0914   Thu Jun 13, 2024   0707 hCG Qualitative, Urine: Negative [CC]   0818 Patient reports feeling anxious after Reglan.  I discussed with her this is likely side-effect and can give Benadryl.  She refused Benadryl because she does not want to be tired.  I explained to her that this will likely resolve or feeling anxiety but she still declines.  Her headache has improved and she would like to be discharged [CC]      ED Course User Index  [CC] Mary Grande PA-C                           Clinical Impression:  Final diagnoses:  [R51.9] Acute nonintractable headache, unspecified headache type (Primary)          ED Disposition Condition    Discharge Stable          ED Prescriptions    None       Follow-up Information       Follow up With Specialties Details Why Contact Info    Lindsey Marinelli FNP Neurology Schedule an appointment as soon as possible for a visit in 1 week  [address] [facility] [address]  [phone]      [facility] - Emergency Dept Emergency Medicine Go to  If symptoms worsen [address] [facility] [id number] [phone]             Mary Grande PA-C  06/13/24 0914

## 2024-07-14 ENCOUNTER — PATIENT MESSAGE (OUTPATIENT)
Dept: OBSTETRICS AND GYNECOLOGY | Facility: CLINIC | Age: 24
End: 2024-07-14

## 2024-07-15 ENCOUNTER — E-VISIT (OUTPATIENT)
Dept: FAMILY MEDICINE | Facility: CLINIC | Age: 24
End: 2024-07-15

## 2024-07-15 DIAGNOSIS — N76.1 SUBACUTE VAGINITIS: Primary | ICD-10-CM

## 2024-07-15 PROCEDURE — 99422 OL DIG E/M SVC 11-20 MIN: CPT | Mod: ,,, | Performed by: FAMILY MEDICINE

## 2024-07-15 RX ORDER — FLUCONAZOLE 150 MG/1
150 TABLET ORAL
Qty: 3 TABLET | Refills: 0 | Status: SHIPPED | OUTPATIENT
Start: 2024-07-15 | End: 2024-07-20

## 2024-07-15 RX ORDER — METRONIDAZOLE 7.5 MG/G
1 GEL VAGINAL NIGHTLY
Qty: 70 G | Refills: 0 | Status: SHIPPED | OUTPATIENT
Start: 2024-07-15 | End: 2024-07-15

## 2024-07-15 RX ORDER — METRONIDAZOLE 500 MG/1
500 TABLET ORAL EVERY 12 HOURS
Qty: 14 TABLET | Refills: 0 | Status: SHIPPED | OUTPATIENT
Start: 2024-07-15 | End: 2024-07-22

## 2024-07-15 NOTE — PROGRESS NOTES
Patient ID: Danette Ang is a 24 y.o. female.    Chief Complaint: Vaginal Discharge (Entered automatically based on patient selection in [employer].)    The patient initiated a request through [employer] on 7/15/2024 for evaluation and management with a chief complaint of Vaginal Discharge (Entered automatically based on patient selection in [employer].)     I evaluated the questionnaire submission on 7/15/24.    [facility] Peq Evisit Vaginal Discharge    7/15/2024  2:50 PM CDT - Filed by Patient   Do you agree to participate in an E-Visit? Yes   If you have any of the following symptoms,  please do not complete an E-Visit,  schedule an appointment with your provider: I acknowledge   Are you pregnant, could you be pregnant, or are you breast feeding? None of the above   What is the main issue you would like addressed today? Vaginal discharge ,smell, dryness   Which of the following are you experiencing? Vaginal Itching;  Vaginal discharge    Are you having pain while passing urine? No, I have no pain while urinating.   Which of the following applies to your vaginal discharge? I have a white/milky discharge.;  I have a foul-smelling discharge.    Which of the following are you experiencing? None of the above   Do you have any sores on your genitals? No    Have you taken antibiotics recently? I have not been on any antibiotics    Do you use any of the following? None of the above   Which of the following applies to your menstrual period? I expect to have a menstrual period soon.   Have you had similar symptoms in the past? Yes, I have had had similar symptoms more than once before.   When you had similar symptoms in the past, did any of the following work? Pills for yeast infection   Have you had a fever? No   During the last 2 months, have you had sexual contact with a specific person for the first time? No   Provide any additional information you feel is important.    Please attach any relevant images or files    Are you able to  take your vital signs? No         Encounter Diagnosis   Name Primary?    Subacute vaginitis Yes        No orders of the defined types were placed in this encounter.     Medications Ordered This Encounter   Medications    fluconazole (DIFLUCAN) 150 MG Tab     Sig: Take 1 tablet (150 mg total) by mouth every 48 hours. for 5 days     Dispense:  3 tablet     Refill:  0    metroNIDAZOLE (FLAGYL) 500 MG tablet     Sig: Take 1 tablet (500 mg total) by mouth every 12 (twelve) hours. for 7 days     Dispense:  14 tablet     Refill:  0        No follow-ups on file.      E-Visit Time Trackin mins

## 2024-08-01 ENCOUNTER — E-VISIT (OUTPATIENT)
Dept: OBSTETRICS AND GYNECOLOGY | Facility: CLINIC | Age: 24
End: 2024-08-01

## 2024-08-01 DIAGNOSIS — N89.8 VAGINAL ODOR: Primary | ICD-10-CM

## 2024-08-01 RX ORDER — METRONIDAZOLE 500 MG/1
500 TABLET ORAL EVERY 12 HOURS
Qty: 14 TABLET | Refills: 0 | Status: SHIPPED | OUTPATIENT
Start: 2024-08-01 | End: 2024-08-08

## 2024-08-02 NOTE — PROGRESS NOTES
24 y.o.  with c/o vaginal odor after menstrual cycle. Has had symptoms before that were similar after menses and was BV. Desires treatment for BV. Counseled if doesn't improve that she'd need to be seen in the office. Rx sent.

## 2024-08-05 ENCOUNTER — PATIENT MESSAGE (OUTPATIENT)
Dept: NEUROLOGY | Facility: CLINIC | Age: 24
End: 2024-08-05

## 2024-08-12 ENCOUNTER — TELEPHONE (OUTPATIENT)
Dept: OBSTETRICS AND GYNECOLOGY | Facility: CLINIC | Age: 24
End: 2024-08-12

## 2024-08-12 NOTE — TELEPHONE ENCOUNTER
----- Message from Roberto Carlos Nahun sent at 8/12/2024 12:08 PM CDT -----  Regarding: Self [phone]  Type:  Patient Returning Call    Who Called:  Self     Who Left Message for Patient:  unknown     Does the patient know what this is regarding?: yes     Would the patient rather a call back or a response via [facility]?  Call back     Best Call Back Number: [phone]     Additional Information:     Thank you.

## 2024-08-12 NOTE — TELEPHONE ENCOUNTER
Called patient back, patient C/O vaginal discharge with odor   Appt has been scheduled patient is aware of date and time.

## 2024-08-15 ENCOUNTER — OFFICE VISIT (OUTPATIENT)
Dept: OBSTETRICS AND GYNECOLOGY | Facility: CLINIC | Age: 24
End: 2024-08-15

## 2024-08-15 VITALS
WEIGHT: 88.88 LBS | BODY MASS INDEX: 17.45 KG/M2 | DIASTOLIC BLOOD PRESSURE: 92 MMHG | SYSTOLIC BLOOD PRESSURE: 136 MMHG | HEIGHT: 60 IN

## 2024-08-15 DIAGNOSIS — Z11.3 SCREEN FOR STD (SEXUALLY TRANSMITTED DISEASE): ICD-10-CM

## 2024-08-15 DIAGNOSIS — N76.0 ACUTE VAGINITIS: Primary | ICD-10-CM

## 2024-08-15 DIAGNOSIS — Z72.89 OTHER PROBLEMS RELATED TO LIFESTYLE: ICD-10-CM

## 2024-08-15 PROCEDURE — 99213 OFFICE O/P EST LOW 20 MIN: CPT | Mod: PBBFAC

## 2024-08-15 PROCEDURE — 87491 CHLMYD TRACH DNA AMP PROBE: CPT

## 2024-08-15 PROCEDURE — 99213 OFFICE O/P EST LOW 20 MIN: CPT | Mod: S$PBB,,,

## 2024-08-15 PROCEDURE — 99999 PR PBB SHADOW E&M-EST. PATIENT-LVL III: CPT | Mod: PBBFAC,,,

## 2024-08-15 PROCEDURE — 81514 NFCT DS BV&VAGINITIS DNA ALG: CPT

## 2024-08-15 PROCEDURE — 87591 N.GONORRHOEAE DNA AMP PROB: CPT

## 2024-08-15 RX ORDER — METRONIDAZOLE 7.5 MG/G
1 GEL VAGINAL NIGHTLY
Qty: 70 G | Refills: 0 | Status: SHIPPED | OUTPATIENT
Start: 2024-08-15 | End: 2024-08-20

## 2024-08-15 RX ORDER — FLUCONAZOLE 150 MG/1
150 TABLET ORAL DAILY
Qty: 1 TABLET | Refills: 0 | Status: SHIPPED | OUTPATIENT
Start: 2024-08-15 | End: 2024-08-16

## 2024-08-15 NOTE — PROGRESS NOTES
CC: vaginal discharge    HPI:  Danette Ang is a 24 y.o. female  presents to walk in clinic with complaint of vaginal discharge for 1 week.  She has been treated for BV and yeast twice within the last month with Flagyl and Diflucan with minimal to no relief. She denies itching.  Reports odor.  She states the discharge is white and thick.  Uses mini-pill for contraception. She states she has had a new partner and requests STD testing, swabs.    Past Medical History:   Diagnosis Date    Anxiety     Migraine headache     Migraine with aura 08/15/2022    Scoliosis      Past Surgical History:   Procedure Laterality Date    BACK SURGERY  2018    SPINE SURGERY       Social History     Tobacco Use    Smoking status: Never     Passive exposure: Never    Smokeless tobacco: Never   Substance Use Topics    Alcohol use: Never    Drug use: Never     Family History   Problem Relation Name Age of Onset    Hypertension Mother Chika ang     Stroke Mother Chika ang     Aneurysm Mother Chika ang     Cancer Maternal Grandmother      Breast cancer Neg Hx      Colon cancer Neg Hx      Ovarian cancer Neg Hx       OB History    Para Term  AB Living   0 0 0 0 0 0   SAB IAB Ectopic Multiple Live Births   0 0 0 0 0       BP (!) 136/92   Ht 5' (1.524 m)   Wt 40.3 kg (88 lb 13.5 oz)   LMP 2024   BMI 17.35 kg/m²     ROS:  GENERAL: No fever, chills, fatigability or weight loss.  VULVAR: No pain, no lesions and no itching.  VAGINAL: No relaxation, no itching, no abnormal bleeding and no lesions. (+) white, thick discharge, (+) odor.   ABDOMEN: No abdominal pain. Denies nausea. Denies vomiting. No diarrhea. No constipation  BREAST: Denies pain. No lumps. No discharge.  URINARY: No incontinence, no nocturia, no frequency and no dysuria.  CARDIOVASCULAR: No chest pain. No shortness of breath. No leg cramps.  NEUROLOGICAL: No headaches. No vision changes.    PHYSICAL EXAM:  VULVA: normal appearing vulva with  no masses, tenderness or lesions   VAGINA: normal appearing vagina with normal color. Thick, white discharge adherent to vaginal walls, no lesions   CERVIX: normal appearing cervix without discharge or lesions     ASSESSMENT and PLAN:    ICD-10-CM ICD-9-CM    1. Acute vaginitis  N76.0 616.10 Vaginosis Screen by DNA Probe      boric acid (PH-D) 600 mg vaginal suppository      metroNIDAZOLE (METROGEL) 0.75 % (37.5mg/5 gram) vaginal gel      fluconazole (DIFLUCAN) 150 MG Tab      2. Screen for STD (sexually transmitted disease)  Z11.3 V74.5 C. trachomatis/N. gonorrhoeae by AMP DNA [facility]; Cervicovaginal      3. Other problems related to lifestyle  Z72.89 V69.8 C. trachomatis/N. gonorrhoeae by AMP DNA [facility]; Cervicovaginal        - AFFIRM and GC/CT collected   - Metrogel for suspected BV  - Boric Acid suppositories discussed  - Probiotics discussed   - Diflucan PRN yeast     Patient was counseled today on vaginitis prevention including :  a. avoiding feminine products such as deoderant soaps, body wash, bubble bath, douches, scented toilet paper, deoderant tampons or pads, feminine wipes, chronic pad use, etc.  b. avoiding other vulvovaginal irritants such as long hot baths, humidity, tight, synthetic clothing, chlorine and sitting around in wet bathing suits  c. wearing cotton underwear, avoiding thong underwear and no underwear to bed  d. taking showers instead of baths and use a hair dryer on cool setting afterwards to dry  e. wearing cotton to exercise and shower immediately after exercise and change clothes  f. using polyurethane condoms without spermicide if sexually active and symptoms are triggered by intercourse    FOLLOW UP: PRN lack of improvement.    Lea TELLEZ-S    I have seen the patient, reviewed the PA students  assessment, plan, and progress note. I have personally interviewed and examined the patient at bedside and: agree with the findings.        Ivet James, TERA SIMONS

## 2024-08-15 NOTE — PATIENT INSTRUCTIONS
Make sure the probiotic contains L. Acidophilus, L. Rhamnosus, and L. Fermentum. Suggested brands include:  - Natural Factors Ultimate Probiotic, Women's Formula  - Provella  - Henry Ultra FemFlora

## 2024-08-16 LAB
C TRACH DNA SPEC QL NAA+PROBE: NOT DETECTED
N GONORRHOEA DNA SPEC QL NAA+PROBE: NOT DETECTED

## 2024-08-22 ENCOUNTER — PATIENT MESSAGE (OUTPATIENT)
Dept: OBSTETRICS AND GYNECOLOGY | Facility: CLINIC | Age: 24
End: 2024-08-22

## 2024-08-22 DIAGNOSIS — N76.0 BV (BACTERIAL VAGINOSIS): Primary | ICD-10-CM

## 2024-08-22 DIAGNOSIS — B96.89 BV (BACTERIAL VAGINOSIS): Primary | ICD-10-CM

## 2024-08-22 RX ORDER — METRONIDAZOLE 500 MG/1
500 TABLET ORAL EVERY 12 HOURS
Qty: 14 TABLET | Refills: 0 | Status: SHIPPED | OUTPATIENT
Start: 2024-08-22 | End: 2024-08-29

## 2024-10-29 ENCOUNTER — PATIENT MESSAGE (OUTPATIENT)
Dept: NEUROLOGY | Facility: CLINIC | Age: 24
End: 2024-10-29

## 2024-11-07 ENCOUNTER — OFFICE VISIT (OUTPATIENT)
Dept: OBSTETRICS AND GYNECOLOGY | Facility: CLINIC | Age: 24
End: 2024-11-07

## 2024-11-07 VITALS
BODY MASS INDEX: 18 KG/M2 | HEIGHT: 60 IN | SYSTOLIC BLOOD PRESSURE: 120 MMHG | WEIGHT: 91.69 LBS | DIASTOLIC BLOOD PRESSURE: 90 MMHG

## 2024-11-07 DIAGNOSIS — Z20.2 POTENTIAL EXPOSURE TO STD: ICD-10-CM

## 2024-11-07 DIAGNOSIS — N76.0 ACUTE VAGINITIS: Primary | ICD-10-CM

## 2024-11-07 DIAGNOSIS — Z30.9 ENCOUNTER FOR CONTRACEPTIVE MANAGEMENT, UNSPECIFIED TYPE: ICD-10-CM

## 2024-11-07 LAB
B-HCG UR QL: NEGATIVE
CTP QC/QA: YES

## 2024-11-07 PROCEDURE — 99214 OFFICE O/P EST MOD 30 MIN: CPT | Mod: S$PBB,,, | Performed by: FAMILY MEDICINE

## 2024-11-07 PROCEDURE — 0352U VAGINOSIS SCREEN BY DNA PROBE: CPT | Performed by: FAMILY MEDICINE

## 2024-11-07 PROCEDURE — 99999 PR PBB SHADOW E&M-EST. PATIENT-LVL III: CPT | Mod: PBBFAC,,, | Performed by: FAMILY MEDICINE

## 2024-11-07 PROCEDURE — 81025 URINE PREGNANCY TEST: CPT | Mod: PBBFAC | Performed by: FAMILY MEDICINE

## 2024-11-07 PROCEDURE — 99999PBSHW POCT URINE PREGNANCY: Mod: PBBFAC,,,

## 2024-11-07 PROCEDURE — 87491 CHLMYD TRACH DNA AMP PROBE: CPT | Performed by: FAMILY MEDICINE

## 2024-11-07 PROCEDURE — 99213 OFFICE O/P EST LOW 20 MIN: CPT | Mod: PBBFAC | Performed by: FAMILY MEDICINE

## 2024-11-07 RX ORDER — DOXEPIN HYDROCHLORIDE 10 MG/1
10 CAPSULE ORAL NIGHTLY
COMMUNITY
Start: 2024-10-24

## 2024-11-07 RX ORDER — NORETHINDRONE 0.35 MG/1
1 TABLET ORAL DAILY
Qty: 84 TABLET | Refills: 3 | Status: SHIPPED | OUTPATIENT
Start: 2024-11-07 | End: 2025-11-06

## 2024-11-07 RX ORDER — PROPRANOLOL HYDROCHLORIDE 60 MG/1
1 CAPSULE, EXTENDED RELEASE ORAL DAILY
COMMUNITY
Start: 2024-11-05

## 2024-11-07 RX ORDER — UBROGEPANT 50 MG/1
1 TABLET ORAL DAILY PRN
COMMUNITY
Start: 2024-11-05

## 2024-11-07 NOTE — PROGRESS NOTES
CC: Vaginitis  HPI: Patient presents for evaluation of an abnormal vaginal discharge. Symptoms have been present for 3  months . Vaginal symptoms: discharge described as thin yellow and odor. Contraception: none. Would like to get back on micronor. She denies local irritation, urinary symptoms of dysuria, hematuria, and fever, and vulvar itching. Sexually transmitted infection risk: very low risk of STD exposure. SA male partner, but not currently. Took flagyl pox2 without relief of sx.. This is the extent of the patient's complaints at this time.     ROS:  GENERAL: No fever, chills, fatigability or weight loss.  VULVAR: No pain, no lesions and no itching.  VAGINAL: No relaxation, no itching, + discharge, no abnormal bleeding and no lesions.+odor  URINARY: No incontinence, no nocturia, no frequency and no dysuria.     PHYSICAL EXAM:  Physical Exam:   Constitutional: She appears well-developed and well-nourished. She does not appear ill. No distress.               Genitourinary:    Uterus, right adnexa and left adnexa normal.      Pelvic exam was performed with patient in the lithotomy position.   The external female genitalia was normal.     Labial bartholins normal.There is no rash, tenderness or lesion on the right labia. There is no rash, tenderness or lesion on the left labia. Cervix is normal. Right adnexum displays no mass, no tenderness and no fullness. Left adnexum displays no mass, no tenderness and no fullness. There is vaginal discharge (thin white) in the vagina. No tenderness, bleeding, rectocele, cystocele or prolapse of vaginal walls in the vagina.    No foreign body in the vagina.   Cervix exhibits no motion tenderness, no lesion, no discharge, no friability and no polyp. Normal urethral meatus.Urethra findings: no urethral mass              Neurological: She is alert. GCS eye subscore is 4. GCS verbal subscore is 5. GCS motor subscore is 6.           Past Medical History:   Diagnosis Date     Anxiety     Migraine headache     Migraine with aura 08/15/2022    Scoliosis        Past Surgical History:   Procedure Laterality Date    BACK SURGERY  02/20/2018    SPINE SURGERY         Family History   Problem Relation Name Age of Onset    Hypertension Mother Chika smalls     Stroke Mother Chika smalls     Aneurysm Mother Chika smalls     Cancer Maternal Grandmother      Breast cancer Neg Hx      Colon cancer Neg Hx      Ovarian cancer Neg Hx         Social History     Socioeconomic History    Marital status: Single   Tobacco Use    Smoking status: Never     Passive exposure: Never    Smokeless tobacco: Never   Substance and Sexual Activity    Alcohol use: Never    Drug use: Never    Sexual activity: Yes     Partners: Male     Birth control/protection: OCP     Social Drivers of Health     Financial Resource Strain: Low Risk  (6/11/2023)    Received from [facility] [employer] [facility]    Overall Financial Resource Strain (CARDIA)     Difficulty of Paying Living Expenses: Not hard at all   Food Insecurity: No Food Insecurity (6/11/2023)    Received from [facility] [employer] [facility]    Hunger Vital Sign     Worried About Running Out of Food in the Last Year: Never true     Ran Out of Food in the Last Year: Never true   Transportation Needs: No Transportation Needs (6/11/2023)    Received from [facility] [employer] [employer] [facility]    PRAPARE - Transportation     Lack of Transportation (Medical): No     Lack of Transportation (Non-Medical): No   Physical Activity: Unknown (3/11/2024)    Exercise Vital Sign     Days of Exercise per Week: 3 days   Stress: Stress Concern Present (9/20/2023)    Received from [facility] [employer] [facility]    [employer] [place] [employer] - Occupational Stress Questionnaire     Feeling of Stress : To some extent   Housing Stability: Unknown (6/11/2023)    Received from [facility] [employer] [facility]    Housing Stability Vital Sign     Unable to Pay for Housing in the Last Year: No     Unstable Housing in the Last  Year: No       Current Outpatient Medications   Medication Sig Dispense Refill    doxepin (SINEQUAN) 10 MG capsule Take 10 mg by mouth every evening.      ibuprofen (ADVIL,MOTRIN) 800 MG tablet Take 1 tablet (800 mg total) by mouth every 8 (eight) hours as needed for Pain. 20 tablet 2    propranoloL (INDERAL LA) 60 MG 24 hr capsule Take 1 capsule by mouth once daily.      rimegepant (NURTEC) 75 mg odt Take 1 tablet (75 mg total) by mouth daily as needed for Migraine. Place ODT tablet on the tongue; alternatively the ODT tablet may be placed under the tongue 8 tablet 0    ubrogepant (UBRELVY) 50 mg tablet Take 1 tablet by mouth daily as needed.      clindamycin phosphate 2 % Gel Place 1 applicator vaginally once. Do not use with polyurethane condoms- no intercourse or tampons for 1 week for 1 dose 8 g 0    norethindrone (MICRONOR) 0.35 mg tablet Take 1 tablet (0.35 mg total) by mouth once daily. 84 tablet 3     No current facility-administered medications for this visit.       Review of patient's allergies indicates:   Allergen Reactions    Trazodone Other (See Comments)     Headaches    Reglan [metoclopramide] Anxiety          OB History    Para Term  AB Living   0 0 0 0 0 0   SAB IAB Ectopic Multiple Live Births   0 0 0 0 0      Results for orders placed or performed in visit on 24   POCT Urine Pregnancy    Collection Time: 24 10:41 AM   Result Value Ref Range    POC Preg Test, Ur Negative Negative     Acceptable Yes          Assessment/Plan:    Acute vaginitis  -     Vaginosis Screen by DNA Probe  -     clindamycin phosphate 2 % Gel; Place 1 applicator vaginally once. Do not use with polyurethane condoms- no intercourse or tampons for 1 week for 1 dose  Dispense: 8 g; Refill: 0    Potential exposure to STD  -     C. trachomatis/N. gonorrhoeae by AMP DNA [facility]; Cervicovaginal    Encounter for contraceptive management, unspecified type  -     POCT Urine Pregnancy  -      norethindrone (MICRONOR) 0.35 mg tablet; Take 1 tablet (0.35 mg total) by mouth once daily.  Dispense: 84 tablet; Refill: 3      Discussed unsure of insurance coverage with vaginosis swab. Pt would still like to do.   Patient desires to begin contraception, risks, benefits and options discussed in detail.  The use of the oral contraceptive has been fully discussed with the patient. This includes the proper method to initiate (i.e. Sunday start after next normal menstrual onset) and continue the pills, the need for regular compliance to ensure adequate contraceptive effect and warnings about anticipated minor side effects such as breakthrough spotting, etc.  The need for additional protection, such as a condom, to prevent exposure to sexually transmitted diseases has also been discussed- the patient has been clearly reminded that OCP's cannot protect her against diseases such as HIV and others. She verbalizes understanding and wishes to take the medication as prescribed.       Patient was counseled today on vaginitis prevention including :  a. avoiding feminine products such as deoderant soaps, body wash, bubble bath, douches, scented toilet paper, deoderant tampons or pads, feminine wipes, chronic pad use, etc.  b. avoiding other vulvovaginal irritants such as long hot baths, humidity, tight, synthetic clothing, chlorine and sitting around in wet bathing suits  c. wearing cotton underwear, avoiding thong underwear and no underwear to bed  d. taking showers instead of baths and use a hair dryer on cool setting afterwards to dry  e. wearing cotton to exercise and shower immediately after exercise and change clothes  f. using polyurethane condoms without spermicide if sexually active and symptoms are triggered by intercourse     FOLLOW UP: PRN/lack of improvement.

## 2024-11-09 ENCOUNTER — PATIENT MESSAGE (OUTPATIENT)
Dept: OBSTETRICS AND GYNECOLOGY | Facility: CLINIC | Age: 24
End: 2024-11-09

## 2024-11-09 LAB
BACTERIAL VAGINOSIS DNA: DETECTED
C TRACH DNA SPEC QL NAA+PROBE: NOT DETECTED
CANDIDA GLABRATA/KRUSEI: NOT DETECTED
CANDIDA RRNA VAG QL PROBE: DETECTED
N GONORRHOEA DNA SPEC QL NAA+PROBE: NOT DETECTED
TRICHOMONAS VAGINALIS: NOT DETECTED

## 2024-11-11 DIAGNOSIS — N76.0 ACUTE VAGINITIS: Primary | ICD-10-CM

## 2024-11-11 RX ORDER — TERCONAZOLE 4 MG/G
1 CREAM VAGINAL NIGHTLY
Qty: 45 G | Refills: 0 | Status: SHIPPED | OUTPATIENT
Start: 2024-11-11 | End: 2024-11-18

## 2024-11-11 RX ORDER — METRONIDAZOLE 500 MG/1
500 TABLET ORAL EVERY 12 HOURS
Qty: 14 TABLET | Refills: 0 | Status: SHIPPED | OUTPATIENT
Start: 2024-11-11 | End: 2024-11-18

## 2024-11-18 ENCOUNTER — PATIENT MESSAGE (OUTPATIENT)
Dept: NEUROLOGY | Facility: CLINIC | Age: 24
End: 2024-11-18

## 2024-11-18 ENCOUNTER — TELEPHONE (OUTPATIENT)
Dept: NEUROLOGY | Facility: CLINIC | Age: 24
End: 2024-11-18

## 2024-11-18 NOTE — TELEPHONE ENCOUNTER
Messaging through the Pt portal.    ----- Message from Mara sent at 11/18/2024 11:30 AM CST -----  Regarding: Paperwork advise  Danette Ang calling regarding Patient Advice (message) for # pt is calling regarding paperwork that needs to be filled out and sent back to email   [email or web address]      For needs to fix questions 8 & 9

## 2024-12-05 ENCOUNTER — E-VISIT (OUTPATIENT)
Dept: FAMILY MEDICINE | Facility: CLINIC | Age: 24
End: 2024-12-05

## 2024-12-05 DIAGNOSIS — N76.0 ACUTE VAGINITIS: Primary | ICD-10-CM

## 2024-12-05 RX ORDER — FLUCONAZOLE 150 MG/1
150 TABLET ORAL ONCE
Qty: 1 TABLET | Refills: 0 | Status: SHIPPED | OUTPATIENT
Start: 2024-12-05 | End: 2024-12-05

## 2024-12-05 RX ORDER — NAPROXEN 500 MG/1
500 TABLET ORAL 2 TIMES DAILY PRN
Qty: 10 TABLET | Refills: 0 | Status: SHIPPED | OUTPATIENT
Start: 2024-12-05 | End: 2024-12-10

## 2024-12-05 RX ORDER — METRONIDAZOLE 7.5 MG/G
1 GEL VAGINAL DAILY
Qty: 5 G | Refills: 0 | Status: SHIPPED | OUTPATIENT
Start: 2024-12-05 | End: 2024-12-10

## 2024-12-05 NOTE — PROGRESS NOTES
Patient ID: Danette Ang is a 24 y.o. female.    Chief Complaint: General Illness (Entered automatically based on patient selection in [employer].)          274}  The patient initiated a request through [employer] on 12/5/2024 for evaluation and management with a chief complaint of General Illness (Entered automatically based on patient selection in [employer].)     I evaluated the questionnaire submission on 12/05/2024 .    Total Time (in minutes): 12     Ohs Peq Evisit [facility]    12/5/2024  1:45 PM CST - Filed by Patient   What do you need help with? Vaginal Symptoms   Do you agree to participate in an E-Visit? Yes   If you have any of the following symptoms,  please do not complete an E-Visit,  schedule an appointment with your provider: I acknowledge   Do you have any of the following pregnancy-related conditions? None   What is the main issue you would like addressed today? Yeast infection, itching irration, chunky white discharge   Which of the following vaginal concerns do you have? Itching   Do you have vaginal discharge? White/milky discharge   Do you have pain while passing urine? No   Do you have any of the following symptoms? None of the above    Have you taken antibiotics in the last two weeks? No    Do you use any of the following? No   Which of the following applies to your menstrual period? Expect soon   Which of the following applies to your menstrual cycle? Normal amount of bleeding   Do you have spotting between periods? Yes   Do you have pain with your period? Yes   What type of pain do you have with your period? Cramping   Have you had similar symptoms in the past? No   Have you had a temperature of 100.4 or higher? No   Provide any additional information you feel is important. None   Please attach any relevant images or files    Are you able to take your vital signs? No          Active Problem List with Overview Notes    Diagnosis Date Noted    Migraine without aura and without status  migrainosus, not intractable 05/17/2022      Recent Labs Obtained:  Lab Results   Component Value Date    WBC 5.30 05/26/2024    HGB 14.4 05/26/2024    HCT 43.1 05/26/2024    MCV 93 05/26/2024     (L) 05/26/2024     11/05/2024    K 4.6 11/05/2024    GLU 81 11/05/2024    CREATININE 0.72 11/05/2024    EGFRNORACEVR 120 11/05/2024    TSH 0.885 06/21/2023      Review of patient's allergies indicates:   Allergen Reactions    Trazodone Other (See Comments)     Headaches    Reglan [metoclopramide] Anxiety       Encounter Diagnosis   Name Primary?    Acute vaginitis Yes        No orders of the defined types were placed in this encounter.     Medications Ordered This Encounter   Medications    fluconazole (DIFLUCAN) 150 MG Tab     Sig: Take 1 tablet (150 mg total) by mouth once. for 1 dose     Dispense:  1 tablet     Refill:  0    metroNIDAZOLE (METROGEL VAGINAL) 0.75 % (37.5mg/5 gram) vaginal gel     Sig: Place 1 applicator vaginally once daily. for 5 days     Dispense:  5 g     Refill:  0    naproxen (NAPROSYN) 500 MG tablet     Sig: Take 1 tablet (500 mg total) by mouth 2 (two) times daily as needed (pain).     Dispense:  10 tablet     Refill:  0        E-Visit Time Tracking:    Day 1 Time (in minutes): 12    Total Time (in minutes): 12      274}

## 2024-12-06 ENCOUNTER — PATIENT MESSAGE (OUTPATIENT)
Dept: OBSTETRICS AND GYNECOLOGY | Facility: CLINIC | Age: 24
End: 2024-12-06

## 2024-12-09 DIAGNOSIS — N76.0 ACUTE VAGINITIS: ICD-10-CM

## 2024-12-09 DIAGNOSIS — N76.0 ACUTE VAGINITIS: Primary | ICD-10-CM

## 2024-12-09 RX ORDER — NAPROXEN 500 MG/1
500 TABLET ORAL 2 TIMES DAILY PRN
Qty: 10 TABLET | Refills: 0 | Status: SHIPPED | OUTPATIENT
Start: 2024-12-09 | End: 2024-12-14

## 2024-12-09 RX ORDER — FLUCONAZOLE 150 MG/1
150 TABLET ORAL ONCE
Qty: 1 TABLET | Refills: 0 | Status: SHIPPED | OUTPATIENT
Start: 2024-12-09 | End: 2024-12-09

## 2024-12-09 RX ORDER — NAPROXEN 500 MG/1
500 TABLET ORAL 2 TIMES DAILY PRN
Qty: 10 TABLET | Refills: 0 | OUTPATIENT
Start: 2024-12-09

## 2024-12-09 NOTE — TELEPHONE ENCOUNTER
Refill Routing Note   Medication(s) are not appropriate for processing by [facility] for the following reason(s):        Outside of protocol  Responsible provider unclear    [facility] action(s):  Route        Medication Therapy Plan: no pcp listed on profile      Appointments  past 12m or future 3m with PCP    Date Provider   Last Visit   12/5/2024 Siva Harding MD   Next Visit   Visit date not found Siva Harding MD   ED visits in past 90 days: 0        Note composed:9:47 AM 12/09/2024

## 2024-12-13 DIAGNOSIS — N76.0 ACUTE VAGINITIS: ICD-10-CM

## 2024-12-13 RX ORDER — FLUCONAZOLE 150 MG/1
TABLET ORAL
Qty: 1 TABLET | Refills: 0 | OUTPATIENT
Start: 2024-12-13

## 2024-12-13 NOTE — TELEPHONE ENCOUNTER
Refill Routing Note   Medication(s) are not appropriate for processing by [facility] for the following reason(s):        Outside of protocol    [facility] action(s):  Route               Appointments  past 12m or future 3m with PCP    Date Provider   Last Visit   Visit date not found Siva Harding MD   Next Visit   Visit date not found Siva Harding MD   ED visits in past 90 days: 0        Note composed:4:50 AM 12/13/2024

## 2025-01-07 ENCOUNTER — PATIENT MESSAGE (OUTPATIENT)
Dept: NEUROLOGY | Facility: CLINIC | Age: 25
End: 2025-01-07

## 2025-01-07 ENCOUNTER — OFFICE VISIT (OUTPATIENT)
Dept: NEUROLOGY | Facility: CLINIC | Age: 25
End: 2025-01-07
Payer: COMMERCIAL

## 2025-01-07 DIAGNOSIS — G43.009 MIGRAINE WITHOUT AURA AND WITHOUT STATUS MIGRAINOSUS, NOT INTRACTABLE: Primary | ICD-10-CM

## 2025-01-07 DIAGNOSIS — I10 ELEVATED BLOOD PRESSURE READING IN OFFICE WITH DIAGNOSIS OF HYPERTENSION: ICD-10-CM

## 2025-01-07 RX ORDER — UBROGEPANT 100 MG/1
100 TABLET ORAL
Qty: 16 TABLET | Refills: 2 | Status: SHIPPED | OUTPATIENT
Start: 2025-01-07

## 2025-01-07 NOTE — PROGRESS NOTES
Established Patient   SUBJECTIVE:  Patient ID: Danette Ang   Chief Complaint: Follow-up    History of Present Illness:  Danette Ang is a 24 y.o. female who presents for follow-up of headaches via virtual visit.     The patient location is: in [place]   The chief complaint leading to consultation is: Follow-up  Visit type: Virtual visit with synchronous audio and video  Total time spent with patient: 6 min  Each patient to whom he or she provides medical services by telemedicine is:  (1) informed of the relationship between the physician and patient and the respective role of any other health care provider with respect to management of the patient; and (2) notified that he or she may decline to receive medical services by telemedicine and may withdraw from such care at any time.    Recommendations made at last Office Visit on 8/23/23:  - Not interested in taking anything for migraine prevention at this time   - For acute migraines - nurtec 75 mg odt   - Triptans contraindicated given continued elevated blood pressure readings    - Continue tracking headaches   - Elevated BP - keep upcoming appt with outside PCP for management/monitoring   - RTC in 6 months or sooner if needed     01/07/2025 - Interval History:  Currently suffering 2-3 migraines per month each of which last 3-4 days in duration. Has been taking her mom's ubrelvy as she has been without insurance since March 2023.  She was started on propranolol 60 mg LA daily for management of hypertension and elevated heart rate.  Has not seen any improvement in her migraines since starting propranolol.  Migraines have been associated with dizziness and lightheadedness, this only happens when migraines are untreated and have persisted for multiple days in duration.    Otherwise, information below is still accurate and current.     08/23/2023 - Interval History:  Migraines have improved, currently experiencing 2-3 migraines per month.  She has not been taking  nortriptyline for migraine prevention and is not interested in starting/taking anything for migraine prevention.  Here today as she needs refill on nurtec.  Blood pressure elevated in clinic today 148/98, has upcoming appt with outside primary care provider.      Otherwise, information below is still accurate and current.      History of Present Illness:   22 y.o. female with migraines, who presents to clinic alone for evaluation of headaches.  Previous patient of ROSALINDA Key, here to re-establish care given Juliana's departure.  Currently suffering 5-6 headache days per month.  Not taking anything for migraine prevention.       Otherwise, information below is still accurate and current.      History of Present Illness  This is a 21 y.o. female who presents to clinic alone for evaluation of headaches  She notes she has been experiencing migraine headaches since she was first diagnosed in 6th grade and previously saw neurologist at [facility] She describes her migraine headaches as a throbbing pain, that feels like her head is going to explode, this pain occurs in her frontalis and parietal region near the top of her head on both sides and notes her headaches are associated with neck pain, photophobia, senstivity to sound and smell, dizziness, watery eyes. She notes her migraine headaches  tend to occur in clusters every 2-3 days a week and will usually last up to 12 hours but recently have started to last as long as 2-3 days, since beginning of February. She notes prior to February she would have 1 Headache every 2 weeks. She notes she has noticed that she her  migrianes occur more often and are more severe around her menstrual cycle , noting she will experience a migraine headache  either the day before or on the 1st day of her menstrual cycle and she will take an ibuprofen 800 mg and this will help relief the headache, but notes when she experiences a headache not related to her mesntrual cycle and she will often  have to treat the headaches with 1st line ibuprofen 800 mg as well as 2 excedrin migraines every 8 hour. She notes this combination of medications does help relief her headaches, but notes she often has to take 4 Excedrin migraines in a day before she gets relief and she does want to have to take this much medication so frequently, notes she currently takes these medications at least 2-3 times a week. She notes she has tried sumatriptan and amitriptyline in the past for tx of her migraine headaches, but the sumatriptan did not help relief her headaches and she did not like the way amitriptyline made her feel.   Previous Hx of HA -she also notes she experiences occasional tension type headaches and sinus headaches.  Location - pain occurs in her frontalis and parietal region near the top of her head on both sides. She  Describes her tension headaches are  a pressure like pain that occurs behind either  left eye and are not as intense as her migraine headaches and can be relieved with warm compress.   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     Caffeine- yes, coke 2 cans a day 2-3 days a week   Alcohol - denies  Smoke - denies     Medications tried and failed: toradol- helps, ibuprofen 800 mg- helps if HA near her menstrual cycle, Excedrin migraines (2), Imitrex, Amitriptyline, Cymbalta 10 mg      Treatments Tried and Response  Sumatriptan  Amitriptyline     Nurtec   Rizatriptan   Toradol   Nortriptyline - did not take   Nurtec - helps  Ubrelvy - given today     Current Medications:    doxepin (SINEQUAN) 10 MG capsule, Take 10 mg by mouth every evening., Disp: , Rfl:     ibuprofen (ADVIL,MOTRIN) 800 MG tablet, Take 1 tablet (800 mg total) by mouth every 8 (eight) hours as needed for Pain., Disp: 20 tablet, Rfl: 2    norethindrone (MICRONOR) 0.35 mg tablet, Take 1 tablet (0.35 mg total) by mouth once daily., Disp: 84 tablet, Rfl: 3    propranoloL (INDERAL LA) 60 MG 24 hr capsule, Take 1 capsule by mouth once daily., Disp: , Rfl:     ubrogepant (UBRELVY) 100 mg tablet, Take 1 tablet (100 mg total) by mouth every 2 (two) hours as needed for Migraine. If symptoms persist or return, may repeat dose after 2 hours. Maximum: 200 mg per 24 hours, Disp: 16 tablet, Rfl: 2    Review of Systems - A review of 10+ systems was conducted with pertinent positive and negative findings documented in HPI with all other systems reviewed and negative.    PFSH: Past medical, family, and social history reviewed as documented in chart with pertinent positive medical, family, and social history detailed in HPI.    OBJECTIVE:  Vitals: There were no vitals taken for this visit.     Physical Exam:  Constitutional: she appears well-developed and well-nourished. she is well groomed. NAD.     Review of Data:   Notes from GYN, family medicine reviewed   Labs:  Office Visit on 11/07/2024   Component Date Value Ref Range Status    Chlamydia, Amplified DNA 11/07/2024 Not Detected  Not Detected Final    N gonorrhoeae, amplified DNA 11/07/2024 Not Detected  Not Detected Final    Bacterial vaginosis DNA 11/07/2024 Detected (A)  Not Detected Final    Candida sp 11/07/2024 Detected (A)  Negative Final    Candida glabrata/krusei 11/07/2024 Not Detected  Not Detected Final    Trichomonas vaginalis 11/07/2024 Not Detected  Not Detected Final    POC Preg Test, Ur 11/07/2024 Negative  Negative Final    Quality  Control Acceptable 11/07/2024 Yes   Final   Office Visit on 08/15/2024   Component Date Value Ref Range Status    Trichomonas vaginalis 08/15/2024 Negative  Negative Final    Candida sp 08/15/2024 Negative  Negative Final    Marilin glabrata DNA 08/15/2024 Negative  Negative Final    Marilin krusei DNA 08/15/2024 Negative  Negative Final    Bacterial vaginosis DNA 08/15/2024 Positive (A)  Negative Final    Chlamydia, Amplified DNA 08/15/2024 Not Detected  Not Detected Final    N gonorrhoeae, amplified DNA 08/15/2024 Not Detected  Not Detected Final     Imaging:  Results for orders placed or performed during the hospital encounter of 01/11/23   CT Head Without Contrast    Narrative    EXAMINATION:  CT HEAD WITHOUT CONTRAST    CLINICAL HISTORY:  Syncope, recurrent;Syncope +headache, states different than prior migraines and syncopal episodes;    TECHNIQUE:  Low dose axial images were obtained through the head.  Coronal and sagittal reformations were also performed. Contrast was not administered.    COMPARISON:  None.    FINDINGS:  The brain parenchyma appears normal for age with good corticomedullary differentiation.  There is no evidence of acute infarct, hemorrhage, or mass.  The ventricular system is normal in size.  No mass-effect or midline shift.  There are no abnormal extra-axial fluid collections.  The paranasal sinuses and mastoid air cells are clear.  The calvarium appears intact.  .      Impression    No acute intracranial abnormalities      Electronically signed by: Manuel Michaud MD  Date:    01/11/2023  Time:    02:35     Note: I have independently reviewed any/all imaging/labs/tests and agree with the report (s) as documented.  Any discrepancies will be as noted/demarcated by free text.  JENNY, BENJAMIN-RAYNE 1/7/2025    ASSESSMENT:  1. Migraine without aura and without status migrainosus, not intractable    2. Elevated blood pressure reading in office with diagnosis of hypertension      PLAN:  - Continue  propranolol 60 mg LA daily as prescribed by PCP for HTN, may also act as migraine preventive agent   - Discussed propranolol may take up to 3-6 months to see full benefits, may need dose adjustment   - For acute migraine - ubrelvy 100 mg   - refills provided    - Continue tracking headaches   - RTC in 3 months or sooner if needed    Orders Placed This Encounter    ubrogepant (UBRELVY) 100 mg tablet     Questions and concerns were sought and answered to the patient's stated verbal satisfaction.  The patient verbalizes understanding and agreement with the above stated treatment plan.     Visit today included increased complexity associated with the care of the episodic problem migraine without aura addressed and managing the longitudinal care of the patient due to the serious and/or complex managed problem(s).  Plan for patient to return to clinic in 3 months for follow-up visit.     CC: No, Primary Doctor      Lindsey Marinelli, FNP-C  [facility]   [phone]    Dr. Gee was available during today's encounter.

## 2025-01-11 ENCOUNTER — HOSPITAL ENCOUNTER (EMERGENCY)
Facility: HOSPITAL | Age: 25
Discharge: HOME | End: 2025-01-12
Attending: EMERGENCY MEDICINE
Payer: COMMERCIAL

## 2025-01-11 DIAGNOSIS — R42 DIZZINESS: ICD-10-CM

## 2025-01-11 DIAGNOSIS — R07.89 CHEST TIGHTNESS: ICD-10-CM

## 2025-01-11 LAB
ANION GAP SERPL CALC-SCNC: 9 MMOL/L (ref 8–16)
B-HCG UR QL: NEGATIVE
BASOPHILS # BLD AUTO: 0.03 K/UL (ref 0–0.2)
BASOPHILS NFR BLD: 0.7 % (ref 0–1.9)
BUN SERPL-MCNC: 9 MG/DL (ref 6–20)
CALCIUM SERPL-MCNC: 9.5 MG/DL (ref 8.7–10.5)
CHLORIDE SERPL-SCNC: 110 MMOL/L (ref 95–110)
CO2 SERPL-SCNC: 20 MMOL/L (ref 23–29)
CREAT SERPL-MCNC: 0.8 MG/DL (ref 0.5–1.4)
CTP QC/QA: YES
D DIMER PPP IA.FEU-MCNC: <0.19 MG/L FEU
DIFFERENTIAL METHOD BLD: NORMAL
EOSINOPHIL # BLD AUTO: 0.1 K/UL (ref 0–0.5)
EOSINOPHIL NFR BLD: 1.1 % (ref 0–8)
ERYTHROCYTE [DISTWIDTH] IN BLOOD BY AUTOMATED COUNT: 11.9 % (ref 11.5–14.5)
EST. GFR  (NO RACE VARIABLE): >60 ML/MIN/1.73 M^2
GLUCOSE SERPL-MCNC: 75 MG/DL (ref 70–110)
HCT VFR BLD AUTO: 43 % (ref 37–48.5)
HGB BLD-MCNC: 14.2 G/DL (ref 12–16)
IMM GRANULOCYTES # BLD AUTO: 0 K/UL (ref 0–0.04)
IMM GRANULOCYTES NFR BLD AUTO: 0 % (ref 0–0.5)
LYMPHOCYTES # BLD AUTO: 1.7 K/UL (ref 1–4.8)
LYMPHOCYTES NFR BLD: 38.6 % (ref 18–48)
MCH RBC QN AUTO: 30.5 PG (ref 27–31)
MCHC RBC AUTO-ENTMCNC: 33 G/DL (ref 32–36)
MCV RBC AUTO: 92 FL (ref 82–98)
MONOCYTES # BLD AUTO: 0.3 K/UL (ref 0.3–1)
MONOCYTES NFR BLD: 7.2 % (ref 4–15)
NEUTROPHILS # BLD AUTO: 2.3 K/UL (ref 1.8–7.7)
NEUTROPHILS NFR BLD: 52.4 % (ref 38–73)
NRBC BLD-RTO: 0 /100 WBC
PLATELET # BLD AUTO: 150 K/UL (ref 150–450)
PMV BLD AUTO: 12.6 FL (ref 9.2–12.9)
POTASSIUM SERPL-SCNC: 4.7 MMOL/L (ref 3.5–5.1)
RBC # BLD AUTO: 4.66 M/UL (ref 4–5.4)
SODIUM SERPL-SCNC: 139 MMOL/L (ref 136–145)
WBC # BLD AUTO: 4.43 K/UL (ref 3.9–12.7)

## 2025-01-11 PROCEDURE — 80048 BASIC METABOLIC PNL TOTAL CA: CPT | Performed by: PHYSICIAN ASSISTANT

## 2025-01-11 PROCEDURE — 93010 ELECTROCARDIOGRAM REPORT: CPT | Mod: ,,, | Performed by: INTERNAL MEDICINE

## 2025-01-11 PROCEDURE — 85025 COMPLETE CBC W/AUTO DIFF WBC: CPT | Performed by: PHYSICIAN ASSISTANT

## 2025-01-11 PROCEDURE — 81025 URINE PREGNANCY TEST: CPT | Performed by: PHYSICIAN ASSISTANT

## 2025-01-11 PROCEDURE — 84484 ASSAY OF TROPONIN QUANT: CPT | Performed by: PHYSICIAN ASSISTANT

## 2025-01-11 PROCEDURE — 93005 ELECTROCARDIOGRAM TRACING: CPT

## 2025-01-11 PROCEDURE — 85379 FIBRIN DEGRADATION QUANT: CPT | Performed by: PHYSICIAN ASSISTANT

## 2025-01-11 PROCEDURE — 99285 EMERGENCY DEPT VISIT HI MDM: CPT | Mod: 25

## 2025-01-12 VITALS
HEART RATE: 79 BPM | TEMPERATURE: 98 F | DIASTOLIC BLOOD PRESSURE: 92 MMHG | WEIGHT: 102 LBS | HEIGHT: 60 IN | RESPIRATION RATE: 16 BRPM | SYSTOLIC BLOOD PRESSURE: 138 MMHG | OXYGEN SATURATION: 98 % | BODY MASS INDEX: 20.03 KG/M2

## 2025-01-12 LAB
OHS QRS DURATION: 90 MS
OHS QTC CALCULATION: 425 MS
TROPONIN I SERPL DL<=0.01 NG/ML-MCNC: <3 NG/L (ref 0–14)

## 2025-01-12 NOTE — ED PROVIDER NOTES
Encounter Date: 1/11/2025       History     Chief Complaint   Patient presents with    Chest Pain     Chest pain, dizziness, lightheadedness and double vision states worse when moving around     11:00 PM    Patient is a 24 year female with a history of migraines, anxiety who presents to [facility] ED via POV for emergent evaluation of chest tightness.  She states her symptoms started at 08:00 after she woke up.  Denies any injury, trauma, heavy lifting.  She denies any acute stressors.  Denies any shortness of breath, cough, abdominal pain, or radiating pain from her chest.  She is compliant with her progestin base control.  Denies any tobacco use, previous history of DVT/ PE, surgeries, travel, or history of cancer.  She d/c propranolol.        Review of patient's allergies indicates:   Allergen Reactions    Trazodone Other (See Comments)     Headaches    Reglan [metoclopramide] Anxiety     Past Medical History:   Diagnosis Date    Anxiety     Migraine headache     Scoliosis      Past Surgical History:   Procedure Laterality Date    BACK SURGERY  02/20/2018    SPINE SURGERY       Family History   Problem Relation Name Age of Onset    Hypertension Mother Chika smalls     Stroke Mother Chika smalls     Aneurysm Mother Chika smalls     Cancer Maternal Grandmother      Breast cancer Neg Hx      Colon cancer Neg Hx      Ovarian cancer Neg Hx       Social History     Tobacco Use    Smoking status: Never     Passive exposure: Never    Smokeless tobacco: Never   Substance Use Topics    Alcohol use: Never    Drug use: Never     Review of Systems   Constitutional:  Negative for activity change, appetite change and fever.   HENT:  Negative for sore throat.    Respiratory:  Positive for chest tightness. Negative for cough and shortness of breath.    Gastrointestinal:  Negative for abdominal pain, diarrhea, nausea and vomiting.   Genitourinary:  Negative for dysuria.   Musculoskeletal:  Negative for back pain.   Skin:  Negative for rash.    Neurological:  Negative for weakness.   Hematological:  Does not bruise/bleed easily.       Physical Exam     Initial Vitals [01/11/25 2154]   BP Pulse Resp Temp SpO2   (!) 147/86 91 19 97.3 °F (36.3 °C) 100 %      MAP       --         Physical Exam    Vitals reviewed.  Constitutional: She appears well-developed and well-nourished. She is not diaphoretic. She is cooperative.  Non-toxic appearance. She does not have a sickly appearance. She does not appear ill. No distress.   HENT:   Head: Normocephalic and atraumatic.   Nose: Nose normal. Mouth/Throat: No trismus in the jaw.   Eyes: Conjunctivae and EOM are normal.   Neck:   Normal range of motion.  Cardiovascular:  Normal rate and regular rhythm.           Pulmonary/Chest: Breath sounds normal. No accessory muscle usage. No tachypnea. No respiratory distress. She has no wheezes. She has no rhonchi. She has no rales.   Abdominal: She exhibits no distension.   Musculoskeletal:         General: Normal range of motion.      Cervical back: Normal range of motion.     Neurological: She is alert. She has normal strength.   Skin: Skin is warm and dry. No erythema. No pallor.         ED Course   Procedures  Labs Reviewed   BASIC METABOLIC PANEL - Abnormal       Result Value    Sodium 139      Potassium 4.7      Chloride 110      CO2 20 (*)     Glucose 75      BUN 9      Creatinine 0.8      Calcium 9.5      Anion Gap 9      eGFR >60.0     CBC W/ AUTO DIFFERENTIAL    WBC 4.43      RBC 4.66      Hemoglobin 14.2      Hematocrit 43.0      MCV 92      MCH 30.5      MCHC 33.0      RDW 11.9      Platelets 150      MPV 12.6      Immature Granulocytes 0.0      Gran # (ANC) 2.3      Immature Grans (Abs) 0.00      Lymph # 1.7      Mono # 0.3      Eos # 0.1      Baso # 0.03      nRBC 0      Gran % 52.4      Lymph % 38.6      Mono % 7.2      Eosinophil % 1.1      Basophil % 0.7      Differential Method Automated     TROPONIN I HIGH SENSITIVITY    Troponin I High Sensitivity <3     D  DIMER, QUANTITATIVE    D-Dimer <0.19     POCT URINE PREGNANCY    POC Preg Test, Ur Negative       Acceptable Yes          ECG Results              EKG 12-lead (Final result)        Collection Time Result Time QRS Duration OHS QTC Calculation    01/11/25 21:56:13 01/12/25 09:50:18 90 425                     Final result by Interface, Lab In [facility] (01/12/25 09:50:26)                   Narrative:    Test Reason : R42,    Vent. Rate :  94 BPM     Atrial Rate :  94 BPM     P-R Int : 140 ms          QRS Dur :  90 ms      QT Int : 340 ms       P-R-T Axes :  69  92   9 degrees    QTcB Int : 425 ms    Normal sinus rhythm with sinus arrhythmia  Rightward axis  Nonspecific T wave abnormality  Abnormal ECG  When compared with ECG of 10-Richy-2023 22:43,  Non-specific change in ST segment in Anterior leads  Confirmed by Dewey Garcia ([id number]) on 1/12/2025 9:50:16 AM    Referred By: AAAREFERRAL SELF           Confirmed By: Dewey Garcia                                  Imaging Results              X-Ray Chest PA And Lateral (Final result)  Result time 01/12/25 00:17:26      Final result by Luke Paulson DO (01/12/25 00:17:26)                   Impression:      No acute abnormality.      Electronically signed by: Luke Paulson  Date:    01/12/2025  Time:    00:17               Narrative:    EXAMINATION:  XR CHEST PA AND LATERAL    CLINICAL HISTORY:  Other chest pain    TECHNIQUE:  PA and lateral views of the chest were performed.    COMPARISON:  08/18/2021.    FINDINGS:  The lungs are well expanded and clear. No focal opacities are seen. The pleural spaces are clear. The cardiac silhouette is unremarkable. The visualized osseous structures are unremarkable.  There is thoracolumbar spinal hardware.                                       Medications - No data to display  Medical Decision Making    Differential diagnosis includes but isn't limited to anxiety, stress reaction, PE.  Chest wall nontender so I do not think  this is musculoskeletal pain.  No signs of respiratory distress or failure.      Since she has nontraumatic chest tightness, rightward axis on EKG and some signs of right heart straining (S1 and Q3),  we will obtain blood work including D-dimer, chest x-ray, and reassess.    Amount and/or Complexity of Data Reviewed  Labs: ordered. Decision-making details documented in ED Course.  Radiology: ordered.               ED Course as of 01/12/25 1904   Sat Jan 11, 2025   2308 BP(!): 147/86 [CL]   2309 Temp: 97.3 °F (36.3 °C) [CL]   2309 Pulse: 91 [CL]   2309 Resp: 19 [CL]   2309 SpO2: 100 % [CL]   2309   On my independent interpretation, EKG with NSR at 94 beats per minute.  Nonspecific T-wave inversions in lead 3, aVF, V3. Normal intervals. R bernard axis. No STEMI. [CL]   Sun Jan 12, 2025   0011 WBC: 4.43 [CL]   0011 Hemoglobin: 14.2 [CL]   0011 D-Dimer: <0.19  Doubt PE. [CL]   0011 Troponin I High Sensitivity: <3 [CL]   0011 Potassium: 4.7 [CL]   0011 Sodium: 139 [CL]   0011 Glucose: 75 [CL]   0011 Creatinine: 0.8 [CL]   0011 hCG Qualitative, Urine: Negative [CL]      ED Course User Index  [CL] Lindsey Rich PA-C            Patient reassessed.  She reports feeling fine.  She was updated with her results which do not show any acute abnormality.  Lower suspicion for life-threatening conditions at this time.  Recommend restarting propranolol.  Following up with PCP.  She actually has a cardiology appointment on Monday.  Return to ED precautions were given.  All of her questions were answered.  Patient comfortable with plan and stable for discharge.                 Clinical Impression:  Final diagnoses:  [R42] Dizziness  [R07.89] Chest tightness          ED Disposition Condition    Discharge Stable              ED Prescriptions    None       Follow-up Information       Follow up With Specialties Details Why Contact Info    Christal Hannah MD  Schedule an appointment as soon as possible for a visit   [place]  Christal Lopez MD  Family Medicine  NPI: [id number]  [address]  [facility] [place] [facility] [address]    [facility] - Emergency Dept Emergency Medicine  If symptoms worsen [address]  [facility] [address]  [phone]             Lindsey Rich PA-C  01/12/25 1908

## 2025-03-13 ENCOUNTER — OFFICE VISIT (OUTPATIENT)
Dept: OBSTETRICS AND GYNECOLOGY | Facility: CLINIC | Age: 25
End: 2025-03-13
Payer: COMMERCIAL

## 2025-03-13 VITALS
BODY MASS INDEX: 18.82 KG/M2 | SYSTOLIC BLOOD PRESSURE: 132 MMHG | DIASTOLIC BLOOD PRESSURE: 86 MMHG | WEIGHT: 95.88 LBS | HEIGHT: 60 IN

## 2025-03-13 DIAGNOSIS — Z20.2 POTENTIAL EXPOSURE TO STD: Primary | ICD-10-CM

## 2025-03-13 DIAGNOSIS — N93.9 ABNORMAL UTERINE BLEEDING (AUB): ICD-10-CM

## 2025-03-13 LAB
B-HCG UR QL: NEGATIVE
BILIRUB UR QL STRIP: NEGATIVE
CTP QC/QA: YES
GLUCOSE UR QL STRIP: NEGATIVE
KETONES UR QL STRIP: NEGATIVE
LEUKOCYTE ESTERASE UR QL STRIP: NEGATIVE
PH, POC UA: 5
POC BLOOD, URINE: NEGATIVE
POC NITRATES, URINE: NEGATIVE
PROT UR QL STRIP: NEGATIVE
SP GR UR STRIP: 1.02 (ref 1–1.03)
UROBILINOGEN UR STRIP-ACNC: NORMAL (ref 0.1–1.1)

## 2025-03-13 PROCEDURE — 81515 NFCT DS BV&VAGINITIS DNA ALG: CPT | Performed by: FAMILY MEDICINE

## 2025-03-13 PROCEDURE — 99213 OFFICE O/P EST LOW 20 MIN: CPT | Mod: S$GLB,,, | Performed by: FAMILY MEDICINE

## 2025-03-13 PROCEDURE — 99999 PR PBB SHADOW E&M-EST. PATIENT-LVL III: CPT | Mod: PBBFAC,,, | Performed by: FAMILY MEDICINE

## 2025-03-13 PROCEDURE — 81025 URINE PREGNANCY TEST: CPT | Mod: S$GLB,,, | Performed by: FAMILY MEDICINE

## 2025-03-13 PROCEDURE — 81003 URINALYSIS AUTO W/O SCOPE: CPT | Mod: QW,S$GLB,, | Performed by: FAMILY MEDICINE

## 2025-03-13 PROCEDURE — 87591 N.GONORRHOEAE DNA AMP PROB: CPT | Performed by: FAMILY MEDICINE

## 2025-03-13 NOTE — PROGRESS NOTES
Chief Complaint: STD testing      HPI:   Pt is a 24 y.o. here today desiring STD testing. SA male without condoms.  Has concerns of possible STDs- recently found out he was cheating on her, found this out last month. On POP, has been consistent with it. Usually has regular cycle but now spotting for 2 weeks. No unusual pelvic pain. No other vd she can tell, no odor/itching/dysuria/hematuria/fever. Hx of cysts. No hx of fibroids. This is the extent of the patient's complaints at this time.     ROS:  GENERAL: No fever, chills, fatigability or weight loss.  VULVAR: No pain, no lesions and no itching.  VAGINAL: No relaxation, no itching, no discharge, + abnormal bleeding and no lesions.  URINARY: No incontinence, no nocturia, no frequency and no dysuria.     PHYSICAL EXAM:  Physical Exam:   Constitutional: She appears well-developed and well-nourished. She does not appear ill. No distress.               Genitourinary:    Uterus, right adnexa and left adnexa normal.      Pelvic exam was performed with patient in the lithotomy position.   The external female genitalia was normal.     Labial bartholins normal.There is no rash, tenderness or lesion on the right labia. There is no rash, tenderness or lesion on the left labia. Cervix is normal. Right adnexum displays no mass, no tenderness and no fullness. Left adnexum displays no mass, no tenderness and no fullness. There is bleeding (brown spotting) in the vagina. No vaginal discharge, tenderness, rectocele, cystocele or prolapse of vaginal walls in the vagina.    No foreign body in the vagina.   Cervix exhibits no motion tenderness, no lesion, no discharge, no friability and no polyp. Normal urethral meatus.Urethra findings: no urethral mass   Genitourinary Comments: Angie chaperone                 Neurological: She is alert. GCS eye subscore is 4. GCS verbal subscore is 5. GCS motor subscore is 6.         Past Medical History:   Diagnosis Date    Anxiety     Migraine  headache     Scoliosis        Past Surgical History:   Procedure Laterality Date    BACK SURGERY  02/20/2018    SPINE SURGERY         Family History   Problem Relation Name Age of Onset    Hypertension Mother Chika smalls     Stroke Mother Chika smalls     Aneurysm Mother Chika smalls     Cancer Maternal Grandmother      Breast cancer Neg Hx      Colon cancer Neg Hx      Ovarian cancer Neg Hx         Social History     Socioeconomic History    Marital status: Single   Tobacco Use    Smoking status: Never     Passive exposure: Never    Smokeless tobacco: Never   Substance and Sexual Activity    Alcohol use: Never    Drug use: Never    Sexual activity: Yes     Partners: Male     Birth control/protection: OCP     Social Drivers of Health     Financial Resource Strain: Low Risk  (6/11/2023)    Received from [facility]    Overall Financial Resource Strain (CARDIA)     Difficulty of Paying Living Expenses: Not hard at all   Food Insecurity: No Food Insecurity (6/11/2023)    Received from [facility]    Hunger Vital Sign     Worried About Running Out of Food in the Last Year: Never true     Ran Out of Food in the Last Year: Never true   Transportation Needs: No Transportation Needs (6/11/2023)    Received from [facility]    PRAPARE - Transportation     Lack of Transportation (Medical): No     Lack of Transportation (Non-Medical): No   Physical Activity: Unknown (3/11/2024)    Exercise Vital Sign     Days of Exercise per Week: 3 days   Stress: Stress Concern Present (9/20/2023)    Received from [facility]    [place] of [employer] - Occupational Stress Questionnaire     Feeling of Stress : To some extent   Housing Stability: Unknown (6/11/2023)    Received from [facility]    Housing Stability Vital Sign     Unable to Pay for Housing in the Last Year: No     Unstable Housing in the Last Year: No       Current Medications[1]    Review of patient's allergies indicates:   Allergen Reactions    Trazodone Other  (See Comments)     Headaches    Reglan [metoclopramide] Anxiety          OB History    Para Term  AB Living   0 0 0 0 0 0   SAB IAB Ectopic Multiple Live Births   0 0 0 0 0        Results for orders placed or performed in visit on 25   POCT urine pregnancy    Collection Time: 25 10:57 AM   Result Value Ref Range    POC Preg Test, Ur Negative Negative     Acceptable Yes    POCT Urinalysis, Dipstick, Automated, W/O Scope    Collection Time: 25 11:11 AM   Result Value Ref Range    POC Blood, Urine Negative Negative    POC Bilirubin, Urine Negative Negative    POC Urobilinogen, Urine Normal 0.1 - 1.1    POC Ketones, Urine Negative Negative    POC Protein, Urine Negative Negative    POC Nitrates, Urine Negative Negative    POC Glucose, Urine Negative Negative    pH, UA 5     POC Specific Gravity, Urine 1.020 1.003 - 1.029    POC Leukocytes, Urine Negative Negative       Assessment/Plan:    Potential exposure to STD  -     C. trachomatis/N. gonorrhoeae by AMP DNA [facility]; Cervicovaginal  -     Vaginosis Screen by DNA Probe    Abnormal uterine bleeding (AUB)  -     POCT urine pregnancy  -     POCT Urinalysis, Dipstick, Automated, W/O Scope        Will call with results. Recommended Condom use 100% to prevent STDs  Discussed unsure of insurance coverage with vaginosis swab. Pt would still like to do. Liked xaciato if tx for BV needed  RTC prn and for annual         [1]   Current Outpatient Medications   Medication Sig Dispense Refill    norethindrone (MICRONOR) 0.35 mg tablet Take 1 tablet (0.35 mg total) by mouth once daily. 84 tablet 3    propranoloL (INDERAL LA) 60 MG 24 hr capsule Take 1 capsule by mouth once daily.      ubrogepant (UBRELVY) 100 mg tablet Take 1 tablet (100 mg total) by mouth every 2 (two) hours as needed for Migraine. If symptoms persist or return, may repeat dose after 2 hours. Maximum: 200 mg per 24 hours 16 tablet 2    doxepin (SINEQUAN) 10 MG capsule  Take 10 mg by mouth every evening. (Patient not taking: Reported on 3/13/2025)       No current facility-administered medications for this visit.

## 2025-03-14 LAB
BACTERIAL VAGINOSIS DNA: NOT DETECTED
C TRACH DNA SPEC QL NAA+PROBE: DETECTED
CANDIDA GLABRATA/KRUSEI: NOT DETECTED
CANDIDA RRNA VAG QL PROBE: DETECTED
N GONORRHOEA DNA SPEC QL NAA+PROBE: NOT DETECTED
TRICHOMONAS VAGINALIS: NOT DETECTED

## 2025-03-17 ENCOUNTER — RESULTS FOLLOW-UP (OUTPATIENT)
Dept: OBSTETRICS AND GYNECOLOGY | Facility: CLINIC | Age: 25
End: 2025-03-17

## 2025-03-17 DIAGNOSIS — A74.9 CHLAMYDIA: Primary | ICD-10-CM

## 2025-03-17 DIAGNOSIS — B37.31 YEAST VAGINITIS: ICD-10-CM

## 2025-03-17 RX ORDER — DOXYCYCLINE 100 MG/1
100 CAPSULE ORAL 2 TIMES DAILY
Qty: 14 CAPSULE | Refills: 1 | Status: SHIPPED | OUTPATIENT
Start: 2025-03-17 | End: 2025-03-24

## 2025-03-17 RX ORDER — FLUCONAZOLE 150 MG/1
150 TABLET ORAL ONCE
Qty: 1 TABLET | Refills: 0 | Status: SHIPPED | OUTPATIENT
Start: 2025-03-17 | End: 2025-03-17

## 2025-03-19 DIAGNOSIS — R11.0 NAUSEA: Primary | ICD-10-CM

## 2025-03-19 RX ORDER — ONDANSETRON 4 MG/1
4 TABLET, ORALLY DISINTEGRATING ORAL EVERY 8 HOURS PRN
Qty: 30 TABLET | Refills: 0 | Status: SHIPPED | OUTPATIENT
Start: 2025-03-19

## 2025-04-08 ENCOUNTER — TELEPHONE (OUTPATIENT)
Dept: OBSTETRICS AND GYNECOLOGY | Facility: CLINIC | Age: 25
End: 2025-04-08
Payer: COMMERCIAL

## 2025-04-08 NOTE — TELEPHONE ENCOUNTER
----- Message from Jovanna sent at 4/7/2025  4:36 PM CDT -----  Regarding: appt request  Name of Who is Calling: Danette What is the request in detail: Patient is requesting a call back to schedule an appointment. She has an appt scheduled but prefer Greg.  Can the clinic reply by [facility]: Yes What Number to Call Back if not in [facility]:  [phone]

## 2025-04-23 ENCOUNTER — PATIENT MESSAGE (OUTPATIENT)
Dept: OBSTETRICS AND GYNECOLOGY | Facility: CLINIC | Age: 25
End: 2025-04-23
Payer: COMMERCIAL

## 2025-04-28 ENCOUNTER — PATIENT MESSAGE (OUTPATIENT)
Dept: OBSTETRICS AND GYNECOLOGY | Facility: CLINIC | Age: 25
End: 2025-04-28
Payer: COMMERCIAL

## 2025-05-08 ENCOUNTER — OFFICE VISIT (OUTPATIENT)
Dept: OBSTETRICS AND GYNECOLOGY | Facility: CLINIC | Age: 25
End: 2025-05-08
Attending: STUDENT IN AN ORGANIZED HEALTH CARE EDUCATION/TRAINING PROGRAM
Payer: COMMERCIAL

## 2025-05-08 VITALS — DIASTOLIC BLOOD PRESSURE: 78 MMHG | SYSTOLIC BLOOD PRESSURE: 130 MMHG | HEIGHT: 60 IN | BODY MASS INDEX: 18.73 KG/M2

## 2025-05-08 DIAGNOSIS — N91.2 AMENORRHEA: ICD-10-CM

## 2025-05-08 DIAGNOSIS — R10.32 LEFT LOWER QUADRANT PAIN: ICD-10-CM

## 2025-05-08 DIAGNOSIS — R10.2 PELVIC PAIN: Primary | ICD-10-CM

## 2025-05-08 LAB
B-HCG UR QL: NEGATIVE
BILIRUB SERPL-MCNC: NEGATIVE MG/DL
BLOOD URINE, POC: ABNORMAL
CLARITY, POC UA: ABNORMAL
COLOR, POC UA: ABNORMAL
CTP QC/QA: YES
GLUCOSE UR QL STRIP: NORMAL
KETONES UR QL STRIP: NEGATIVE
LEUKOCYTE ESTERASE URINE, POC: ABNORMAL
NITRITE, POC UA: NEGATIVE
PH, POC UA: 5
PROTEIN, POC: NEGATIVE
SPECIFIC GRAVITY, POC UA: 1.02
UROBILINOGEN, POC UA: NORMAL

## 2025-05-08 PROCEDURE — 99214 OFFICE O/P EST MOD 30 MIN: CPT | Mod: S$GLB,,, | Performed by: STUDENT IN AN ORGANIZED HEALTH CARE EDUCATION/TRAINING PROGRAM

## 2025-05-08 PROCEDURE — 99999 PR PBB SHADOW E&M-EST. PATIENT-LVL III: CPT | Mod: PBBFAC,,, | Performed by: STUDENT IN AN ORGANIZED HEALTH CARE EDUCATION/TRAINING PROGRAM

## 2025-05-08 PROCEDURE — 81515 NFCT DS BV&VAGINITIS DNA ALG: CPT | Performed by: STUDENT IN AN ORGANIZED HEALTH CARE EDUCATION/TRAINING PROGRAM

## 2025-05-08 PROCEDURE — 87591 N.GONORRHOEAE DNA AMP PROB: CPT | Performed by: STUDENT IN AN ORGANIZED HEALTH CARE EDUCATION/TRAINING PROGRAM

## 2025-05-08 PROCEDURE — 81002 URINALYSIS NONAUTO W/O SCOPE: CPT | Mod: S$GLB,,, | Performed by: STUDENT IN AN ORGANIZED HEALTH CARE EDUCATION/TRAINING PROGRAM

## 2025-05-08 PROCEDURE — 81025 URINE PREGNANCY TEST: CPT | Mod: S$GLB,,, | Performed by: STUDENT IN AN ORGANIZED HEALTH CARE EDUCATION/TRAINING PROGRAM

## 2025-05-08 RX ORDER — FLUCONAZOLE 150 MG/1
150 TABLET ORAL ONCE
Qty: 2 TABLET | Refills: 1 | Status: SHIPPED | OUTPATIENT
Start: 2025-05-08 | End: 2025-05-08

## 2025-05-12 LAB
BACTERIAL VAGINOSIS DNA (OHS): NOT DETECTED
C TRACH DNA SPEC QL NAA+PROBE: NOT DETECTED
CANDIDA GLABRATA/KRUSEI DNA (OHS): NOT DETECTED
CANDIDA SPECIES DNA (OHS): NOT DETECTED
CTGC SOURCE (OHS) ORD-325: NORMAL
N GONORRHOEA DNA UR QL NAA+PROBE: NOT DETECTED
TRICHOMONAS VAGINALIS DNA (OHS): NOT DETECTED

## 2025-05-14 NOTE — PROGRESS NOTES
hematuria.  NEUROLOGIC: Denies syncope or weakness.   PSYCHIATRIC: Denies depression, anxiety or mood swings.    Physical Exam:      PHYSICAL EXAM:   Vitals:    05/08/25 0932   BP: 130/78   Height: 5' (1.524 m)   PainSc:   8   PainLoc: Groin     Body mass index is 18.73 kg/m².    General: No acute distress; alert and engaged.  Neck:  Supple, no thyromegaly.  Cardiovascular:  Regular rate and rhythm.    Lungs:  Clear to auscultation bilaterally.   Breast:  Symmetric, no masses, nontender, no lesions, no nipple discharge.    Abdomen: Soft, non-tender, non-distended, no masses.  Pelvic: External genitalia and urethra within normal limits. Vagina    without lesions, without discharge, without erythema, without ulcers.  Cervix without cervical motion tenderness, non-friable. Uterus normal in size and nontender. No adnexal masses or tenderness palpated.  Extremities:  No edema, nontender.   Female chaperone was present for exam. Upt neg      Assessment/Plan:     Pelvic pain  -     POCT URINE DIPSTICK WITHOUT MICROSCOPE  -     Cancel: Urine culture    Left lower quadrant pain  -     POCT URINE DIPSTICK WITHOUT MICROSCOPE  -     Cancel: Urine culture  -     Vaginosis Screen by DNA Probe  -     C. trachomatis/N. gonorrhoeae by AMP DNA [facility]; Cervicovaginal    Amenorrhea  -     POCT urine pregnancy  -     US Pelvis Comp with Transvag NON-OB (xpd; Future; Expected date: 05/08/2025  -     Follicle Stimulating Hormone; Future; Expected date: 05/08/2025  -     Estradiol; Future; Expected date: 05/08/2025    Other orders  -     fluconazole (DIFLUCAN) 150 MG Tab; Take 1 tablet (150 mg total) by mouth once. Take one tablet. If symptoms persist, repeat dose in 3 days. for 1 dose  Dispense: 2 tablet; Refill: 1      Pelvic pain:  Reviewed multiple etiologies.  Swabs collected.  U/s ordered.  Patient was counseled today on vulvar and perineal care:    -Avoid feminine products such as deodorant soaps, body wash, bubble bath, douches,  scented toilet paper, deodorant tampons or pads, feminine wipes, chronic pad use, etc.  -Avoid other vulvovaginal irritants such as long hot baths, humidity, tight, synthetic clothing, chlorine and sitting around in wet bathing suits  -Wear cotton underwear.  -Shower immediately after exercise and change clothes  -She was encouraged not to douche  Will treat for yeast  Reviewed contraception options and all questions answered.  RTC for u/s or sooner if needed.        Use of the [facility] Patient Portal discussed and encouraged during today's visit.            [1]   Current Outpatient Medications on File Prior to Visit   Medication Sig Dispense Refill    norethindrone (MICRONOR) 0.35 mg tablet Take 1 tablet (0.35 mg total) by mouth once daily. 84 tablet 3    ondansetron (ZOFRAN-ODT) 4 MG TbDL Take 1 tablet (4 mg total) by mouth every 8 (eight) hours as needed (nausea). 30 tablet 0    propranoloL (INDERAL LA) 60 MG 24 hr capsule Take 1 capsule by mouth once daily.      ubrogepant (UBRELVY) 100 mg tablet Take 1 tablet (100 mg total) by mouth every 2 (two) hours as needed for Migraine. If symptoms persist or return, may repeat dose after 2 hours. Maximum: 200 mg per 24 hours 16 tablet 2    doxepin (SINEQUAN) 10 MG capsule Take 10 mg by mouth every evening. (Patient not taking: Reported on 5/8/2025)       No current facility-administered medications on file prior to visit.

## 2025-05-15 ENCOUNTER — RESULTS FOLLOW-UP (OUTPATIENT)
Dept: OBSTETRICS AND GYNECOLOGY | Facility: CLINIC | Age: 25
End: 2025-05-15

## 2025-05-22 ENCOUNTER — OFFICE VISIT (OUTPATIENT)
Dept: OBSTETRICS AND GYNECOLOGY | Facility: CLINIC | Age: 25
End: 2025-05-22
Attending: STUDENT IN AN ORGANIZED HEALTH CARE EDUCATION/TRAINING PROGRAM
Payer: COMMERCIAL

## 2025-05-22 ENCOUNTER — TELEPHONE (OUTPATIENT)
Dept: OBSTETRICS AND GYNECOLOGY | Facility: CLINIC | Age: 25
End: 2025-05-22

## 2025-05-22 VITALS — WEIGHT: 100.31 LBS | BODY MASS INDEX: 19.69 KG/M2 | HEIGHT: 60 IN

## 2025-05-22 DIAGNOSIS — N92.6 IRREGULAR PERIODS/MENSTRUAL CYCLES: Primary | ICD-10-CM

## 2025-05-22 PROCEDURE — 99213 OFFICE O/P EST LOW 20 MIN: CPT | Mod: S$GLB,,, | Performed by: STUDENT IN AN ORGANIZED HEALTH CARE EDUCATION/TRAINING PROGRAM

## 2025-05-22 PROCEDURE — 99999 PR PBB SHADOW E&M-EST. PATIENT-LVL III: CPT | Mod: PBBFAC,,, | Performed by: STUDENT IN AN ORGANIZED HEALTH CARE EDUCATION/TRAINING PROGRAM

## 2025-05-22 RX ORDER — FLUCONAZOLE 150 MG/1
150 TABLET ORAL
COMMUNITY
Start: 2025-05-08

## 2025-05-22 RX ORDER — DICYCLOMINE HYDROCHLORIDE 20 MG/1
20 TABLET ORAL EVERY 6 HOURS
COMMUNITY
Start: 2025-05-08

## 2025-05-22 RX ORDER — DOXYCYCLINE 100 MG/1
100 CAPSULE ORAL
COMMUNITY
Start: 2025-03-18

## 2025-05-22 NOTE — PROGRESS NOTES
Chief Complaint: U/S Results     HPI:      Danette Ang is a 24 y.o.  who presents today for follow up of U/S results.  LMP: Patient's last menstrual period was 2025 (exact date).  Has been on progesterone only ocp for several years.  Stopped pill in march.  Had some spotting but no cycle yet.  No other issues, problems, or complaints.     OB History          0    Para   0    Term   0       0    AB   0    Living   0         SAB   0    IAB   0    Ectopic   0    Multiple   0    Live Births   0               Past Medical History:   Diagnosis Date    Anxiety     Migraine headache     Scoliosis      Past Surgical History:   Procedure Laterality Date    BACK SURGERY  2018    SPINE SURGERY       Social History[1]  Family History   Problem Relation Name Age of Onset    Hypertension Mother Chika ang     Stroke Mother Chika ang     Aneurysm Mother Chika ang     Cancer Maternal Grandmother Jessica ang     Breast cancer Neg Hx      Colon cancer Neg Hx      Ovarian cancer Neg Hx       Current Medications[2]    ROS:     GENERAL: Denies unintentional weight gain or weight loss. Feeling well overall.   SKIN: Denies rash or lesions.   HEENT: Denies headaches, or vision changes.   ABDOMEN: Denies abdominal pain, constipation, diarrhea, nausea, vomiting, change in appetite.  URINARY: Denies frequency, dysuria, hematuria.  NEUROLOGIC: Denies syncope or weakness.   PSYCHIATRIC: Denies depression, anxiety or mood swings.    Physical Exam:      PHYSICAL EXAM:  Ht 5' (1.524 m)   Wt 45.5 kg (100 lb 5 oz)   LMP 2025 (Exact Date)   BMI 19.59 kg/m²   Body mass index is 19.59 kg/m².     APPEARANCE: Well nourished, well developed, in no acute distress.  PSYCH: Appropriate mood and affect.  SKIN: No acne or hirsutism.      Assessment/Plan:     24 y.o.     Irregular periods/menstrual cycles  -     Testosterone,Total; Future; Expected date: 2025  -     Testosterone, Free; Future;  Expected date: 2025  -     17-Hydroxyprogesterone; Future; Expected date: 2025          Counselin.  Irregular menses:  U/S results reviewed with patient.  Awaiting lab results.  Reviewed with patient and all questions answered.  Will follow up results.  RTC in 3 months or sooner if needed.         [1]   Social History  Socioeconomic History    Marital status: Single   Tobacco Use    Smoking status: Never     Passive exposure: Never    Smokeless tobacco: Never   Substance and Sexual Activity    Alcohol use: Never    Drug use: Never    Sexual activity: Yes     Partners: Male     Birth control/protection: OCP     Social Drivers of Health     Financial Resource Strain: Low Risk  (2023)    Received from [facility]    Overall Financial Resource Strain (CARDIA)     Difficulty of Paying Living Expenses: Not hard at all   Food Insecurity: No Food Insecurity (2023)    Received from [facility]    Hunger Vital Sign     Worried About Running Out of Food in the Last Year: Never true     Ran Out of Food in the Last Year: Never true   Transportation Needs: No Transportation Needs (2023)    Received from [facility]    PRAPARE - Transportation     Lack of Transportation (Medical): No     Lack of Transportation (Non-Medical): No   Physical Activity: Unknown (2025)    Exercise Vital Sign     Days of Exercise per Week: 3 days   Stress: Stress Concern Present (2023)    Received from [facility]    [place] of [employer]ational [employer] - Occupational Stress Questionnaire     Feeling of Stress : To some extent   Housing Stability: Unknown (2023)    Received from [facility]    Housing Stability Vital Sign     Unable to Pay for Housing in the Last Year: No     Unstable Housing in the Last Year: No   [2]   Current Outpatient Medications:     doxepin (SINEQUAN) 10 MG capsule, Take 10 mg by mouth every evening., Disp: , Rfl:     fluconazole (DIFLUCAN) 150 MG Tab, Take 150 mg by mouth Every  3 (three) days., Disp: , Rfl:     norethindrone (MICRONOR) 0.35 mg tablet, Take 1 tablet (0.35 mg total) by mouth once daily., Disp: 84 tablet, Rfl: 3    ondansetron (ZOFRAN-ODT) 4 MG TbDL, Take 1 tablet (4 mg total) by mouth every 8 (eight) hours as needed (nausea)., Disp: 30 tablet, Rfl: 0    propranoloL (INDERAL LA) 60 MG 24 hr capsule, Take 1 capsule by mouth once daily., Disp: , Rfl:     ubrogepant (UBRELVY) 100 mg tablet, Take 1 tablet (100 mg total) by mouth every 2 (two) hours as needed for Migraine. If symptoms persist or return, may repeat dose after 2 hours. Maximum: 200 mg per 24 hours, Disp: 16 tablet, Rfl: 2    dicyclomine (BENTYL) 20 mg tablet, Take 20 mg by mouth every 6 (six) hours. (Patient not taking: Reported on 5/22/2025), Disp: , Rfl:     doxycycline (VIBRAMYCIN) 100 MG Cap, Take 100 mg by mouth. (Patient not taking: Reported on 5/22/2025), Disp: , Rfl:

## 2025-05-22 NOTE — TELEPHONE ENCOUNTER
Spoke to pt, pt stated lab wait was too long, I asked if she would like for me to schedule her a different day, she stated she would like to walk in.

## 2025-05-22 NOTE — TELEPHONE ENCOUNTER
----- Message from Juliet Blum MD sent at 5/22/2025 12:22 PM CDT -----  Regarding: Labs  Can we get her scheduled for blood work?  I think she is having trouble with labThank you

## 2025-05-28 ENCOUNTER — PATIENT MESSAGE (OUTPATIENT)
Dept: OBSTETRICS AND GYNECOLOGY | Facility: CLINIC | Age: 25
End: 2025-05-28
Payer: COMMERCIAL

## 2025-05-28 ENCOUNTER — E-VISIT (OUTPATIENT)
Dept: URGENT CARE | Facility: CLINIC | Age: 25
End: 2025-05-28
Payer: COMMERCIAL

## 2025-05-28 DIAGNOSIS — N94.6 DYSMENORRHEA: Primary | ICD-10-CM

## 2025-05-28 DIAGNOSIS — N94.6 DYSMENORRHEA: ICD-10-CM

## 2025-05-28 PROCEDURE — 99499 UNLISTED E&M SERVICE: CPT | Mod: 95,,, | Performed by: NURSE PRACTITIONER

## 2025-05-28 RX ORDER — IBUPROFEN 800 MG/1
800 TABLET, FILM COATED ORAL EVERY 8 HOURS PRN
Qty: 20 TABLET | Refills: 2 | Status: SHIPPED | OUTPATIENT
Start: 2025-05-28

## 2025-05-28 RX ORDER — NAPROXEN 500 MG/1
500 TABLET ORAL 2 TIMES DAILY WITH MEALS
Qty: 20 TABLET | Refills: 0 | Status: SHIPPED | OUTPATIENT
Start: 2025-05-28 | End: 2025-06-07

## 2025-05-28 NOTE — TELEPHONE ENCOUNTER
Refill Routing Note   Medication(s) are not appropriate for processing by [facility] for the following reason(s):        Outside of protocol    [facility] action(s):  Route               Appointments  past 12m or future 3m with PCP    Date Provider   Last Visit   8/1/2024 Lindsey Garza MD   Next Visit   6/10/2025 Lindsey Garza MD   ED visits in past 90 days: 0        Note composed:9:48 AM 05/28/2025

## 2025-05-28 NOTE — PROGRESS NOTES
Patient ID: Danette Ang is a 24 y.o. female.    Chief Complaint: General Illness (Entered automatically based on patient selection in [employer].)    The patient initiated a request through [employer] on 5/28/2025 for evaluation and management with a chief complaint of General Illness (Entered automatically based on patient selection in [employer].)     I evaluated the questionnaire submission on 5/28/25.    [facility]    5/28/2025 12:32 PM CDT - Filed by Patient   What do you need help with? Period Cramp Relief   Do you agree to participate in an E-Visit? Yes   If you have any of the following symptoms, please go to your closest emergency room or call 911. I acknowledge   Do you have any of the following pregnancy-related conditions? None   What is the main issue you would like addressed today? Abdomen pain, pelvic pain, headache   I would like to address: New or worsening Period Pain   What age did your period start? 13   How long have you had period cramps? Always   How many days does your pain last? 3-4 days   How much bleeding do you have during your period? Moderate (soaking more than 1 pad in 3 hours)   About how long is your period? 7 days or more   Do you have a period every month? No   On a scale of 1 to 10, what would you rate your period cramps at its worst? 10   How would you describe your period cramps? Aching;  Cramping;  Dull;  Sharp;  Shooting;  Stabbing;  Throbbing   When does your period pain usually start? During period   Does your period pain prevent you from doing your daily activities? Yes   Where is your pain located? Other   Where is your pain located? Whole lower abdomen   What makes your pain better? Heating pad;  Medications (Tylenol, Ibuprofen, Naproxen);  Relaxation   What makes your pain worse? None   Do you have any of the following symptoms with your period cramps? Back pain;  Headache   Are you currently planning to become pregnant? No   Have you ever used  oral contraceptive pills? Yes   Did you have any side effects from oral contraceptive pills? No   Have you been diagnosed with any of the following? None   Do you have a history of any of the following? Migraines   Have you taken a home pregnancy test? No   Provide any additional information you feel is important.    Please attach any relevant images or files    Are you able to take your vital signs? No         Encounter Diagnosis   Name Primary?    Dysmenorrhea Yes        No orders of the defined types were placed in this encounter.     Medications Ordered This Encounter   Medications    naproxen (NAPROSYN) 500 MG tablet     Sig: Take 1 tablet (500 mg total) by mouth 2 (two) times daily with meals. for 10 days     Dispense:  20 tablet     Refill:  0        No follow-ups on file.      E-Visit Time Tracking:       3 min

## 2025-06-03 NOTE — DISCHARGE INSTRUCTIONS
Diagnosis:  Migraine headache    I think your symptoms are due to a migraine.  Please schedule a follow-up appointment with your neurologist for further monitoring.  If you start to have any worsening symptoms, please return to the emergency department.   The patient’s EDNA is currently under control. No recent panic attacks, severe anxiety or major sleep disturbance. CPM and report any exacerbations. Further evaluation, treatment, and follow-up per orders, current med list, and patient instructions.

## 2025-06-08 ENCOUNTER — HOSPITAL ENCOUNTER (EMERGENCY)
Facility: OTHER | Age: 25
Discharge: HOME | End: 2025-06-08
Attending: EMERGENCY MEDICINE
Payer: COMMERCIAL

## 2025-06-08 VITALS
SYSTOLIC BLOOD PRESSURE: 161 MMHG | DIASTOLIC BLOOD PRESSURE: 91 MMHG | WEIGHT: 100 LBS | BODY MASS INDEX: 19.63 KG/M2 | HEIGHT: 60 IN | RESPIRATION RATE: 20 BRPM | TEMPERATURE: 98 F | HEART RATE: 76 BPM | OXYGEN SATURATION: 99 %

## 2025-06-08 DIAGNOSIS — R51.9 ACUTE NONINTRACTABLE HEADACHE, UNSPECIFIED HEADACHE TYPE: Primary | ICD-10-CM

## 2025-06-08 LAB
ABSOLUTE EOSINOPHIL (OHS): 0.06 K/UL
ABSOLUTE MONOCYTE (OHS): 0.4 K/UL (ref 0.3–1)
ABSOLUTE NEUTROPHIL COUNT (OHS): 2 K/UL (ref 1.8–7.7)
ALBUMIN SERPL BCP-MCNC: 4.3 G/DL (ref 3.5–5.2)
ALP SERPL-CCNC: 60 UNIT/L (ref 40–150)
ALT SERPL W/O P-5'-P-CCNC: 10 UNIT/L (ref 10–44)
ANION GAP (OHS): 14 MMOL/L (ref 8–16)
AST SERPL-CCNC: 21 UNIT/L (ref 11–45)
B-HCG UR QL: NEGATIVE
BACTERIA #/AREA URNS AUTO: ABNORMAL /HPF
BASOPHILS # BLD AUTO: 0.03 K/UL
BASOPHILS NFR BLD AUTO: 0.6 %
BILIRUB SERPL-MCNC: 1.3 MG/DL (ref 0.1–1)
BILIRUB UR QL STRIP.AUTO: NEGATIVE
BUN SERPL-MCNC: 8 MG/DL (ref 6–20)
CALCIUM SERPL-MCNC: 9.4 MG/DL (ref 8.7–10.5)
CHLORIDE SERPL-SCNC: 107 MMOL/L (ref 95–110)
CLARITY UR: ABNORMAL
CO2 SERPL-SCNC: 18 MMOL/L (ref 23–29)
COLOR UR AUTO: ABNORMAL
CREAT SERPL-MCNC: 0.9 MG/DL (ref 0.5–1.4)
CTP QC/QA: YES
ERYTHROCYTE [DISTWIDTH] IN BLOOD BY AUTOMATED COUNT: 11.9 % (ref 11.5–14.5)
GFR SERPLBLD CREATININE-BSD FMLA CKD-EPI: >60 ML/MIN/1.73/M2
GLUCOSE SERPL-MCNC: 85 MG/DL (ref 70–110)
GLUCOSE UR QL STRIP: NEGATIVE
HCT VFR BLD AUTO: 44.4 % (ref 37–48.5)
HCV AB SERPL QL IA: NEGATIVE
HGB BLD-MCNC: 15.2 GM/DL (ref 12–16)
HGB UR QL STRIP: ABNORMAL
HIV 1+2 AB+HIV1 P24 AG SERPL QL IA: NEGATIVE
HOLD SPECIMEN: 1
HOLD SPECIMEN: YES
HYALINE CASTS UR QL AUTO: 1 /LPF (ref 0–1)
IMM GRANULOCYTES # BLD AUTO: 0 K/UL (ref 0–0.04)
IMM GRANULOCYTES NFR BLD AUTO: 0 % (ref 0–0.5)
KETONES UR QL STRIP: ABNORMAL
LEUKOCYTE ESTERASE UR QL STRIP: ABNORMAL
LYMPHOCYTES # BLD AUTO: 2.51 K/UL (ref 1–4.8)
MCH RBC QN AUTO: 30.8 PG (ref 27–31)
MCHC RBC AUTO-ENTMCNC: 34.2 G/DL (ref 32–36)
MCV RBC AUTO: 90 FL (ref 82–98)
MICROSCOPIC COMMENT: ABNORMAL
NITRITE UR QL STRIP: NEGATIVE
NUCLEATED RBC (/100WBC) (OHS): 0 /100 WBC
PH UR STRIP: 6 [PH]
PLATELET # BLD AUTO: 192 K/UL (ref 150–450)
PMV BLD AUTO: 11.7 FL (ref 9.2–12.9)
POTASSIUM SERPL-SCNC: 3.7 MMOL/L (ref 3.5–5.1)
PROT SERPL-MCNC: 8.3 GM/DL (ref 6–8.4)
PROT UR QL STRIP: ABNORMAL
RBC # BLD AUTO: 4.93 M/UL (ref 4–5.4)
RBC #/AREA URNS AUTO: >100 /HPF (ref 0–4)
RELATIVE EOSINOPHIL (OHS): 1.2 %
RELATIVE LYMPHOCYTE (OHS): 50.2 % (ref 18–48)
RELATIVE MONOCYTE (OHS): 8 % (ref 4–15)
RELATIVE NEUTROPHIL (OHS): 40 % (ref 38–73)
SODIUM SERPL-SCNC: 139 MMOL/L (ref 136–145)
SP GR UR STRIP: >=1.03
SQUAMOUS #/AREA URNS AUTO: 5 /HPF
UROBILINOGEN UR STRIP-ACNC: NEGATIVE EU/DL
WBC # BLD AUTO: 5 K/UL (ref 3.9–12.7)
WBC #/AREA URNS AUTO: 11 /HPF (ref 0–5)

## 2025-06-08 PROCEDURE — 25500020 PHARM REV CODE 255: Performed by: EMERGENCY MEDICINE

## 2025-06-08 PROCEDURE — 85025 COMPLETE CBC W/AUTO DIFF WBC: CPT | Performed by: EMERGENCY MEDICINE

## 2025-06-08 PROCEDURE — 81025 URINE PREGNANCY TEST: CPT | Performed by: EMERGENCY MEDICINE

## 2025-06-08 PROCEDURE — 96361 HYDRATE IV INFUSION ADD-ON: CPT

## 2025-06-08 PROCEDURE — 86803 HEPATITIS C AB TEST: CPT | Performed by: EMERGENCY MEDICINE

## 2025-06-08 PROCEDURE — 99285 EMERGENCY DEPT VISIT HI MDM: CPT | Mod: 25

## 2025-06-08 PROCEDURE — 81001 URINALYSIS AUTO W/SCOPE: CPT | Performed by: EMERGENCY MEDICINE

## 2025-06-08 PROCEDURE — 63600175 PHARM REV CODE 636 W HCPCS: Mod: JZ,TB | Performed by: EMERGENCY MEDICINE

## 2025-06-08 PROCEDURE — 87389 HIV-1 AG W/HIV-1&-2 AB AG IA: CPT | Performed by: EMERGENCY MEDICINE

## 2025-06-08 PROCEDURE — 96374 THER/PROPH/DIAG INJ IV PUSH: CPT

## 2025-06-08 PROCEDURE — 82040 ASSAY OF SERUM ALBUMIN: CPT | Performed by: EMERGENCY MEDICINE

## 2025-06-08 PROCEDURE — 25000003 PHARM REV CODE 250: Performed by: EMERGENCY MEDICINE

## 2025-06-08 RX ORDER — SODIUM CHLORIDE 9 MG/ML
500 INJECTION, SOLUTION INTRAVENOUS
Status: COMPLETED | OUTPATIENT
Start: 2025-06-08 | End: 2025-06-08

## 2025-06-08 RX ORDER — KETOROLAC TROMETHAMINE 30 MG/ML
30 INJECTION, SOLUTION INTRAMUSCULAR; INTRAVENOUS
Status: COMPLETED | OUTPATIENT
Start: 2025-06-08 | End: 2025-06-08

## 2025-06-08 RX ADMIN — IOHEXOL 100 ML: 350 INJECTION, SOLUTION INTRAVENOUS at 10:06

## 2025-06-08 RX ADMIN — PROPRANOLOL HYDROCHLORIDE 60 MG: 40 TABLET ORAL at 10:06

## 2025-06-08 RX ADMIN — SODIUM CHLORIDE 500 ML: 0.9 INJECTION, SOLUTION INTRAVENOUS at 10:06

## 2025-06-08 RX ADMIN — KETOROLAC TROMETHAMINE 30 MG: 30 INJECTION, SOLUTION INTRAMUSCULAR; INTRAVENOUS at 10:06

## 2025-06-08 NOTE — ED PROVIDER NOTES
Encounter Date: 6/8/2025       History     Chief Complaint   Patient presents with    Headache     BP yesterday 209/111, took BP which helped. Patient reports headache and numbness to left arm, blurred vision. Patient reports feeling dizzy/ lightheaded. Patient states symptoms began last night at 2000.      Seen by physician at 9:37AM:    Patient is a 24-year-old male who presents to the emergency department with headache, elevated blood pressure.  Patient started having elevated headache and blood pressure yesterday with a systolic in the 200s.  She woke up this morning at around 7:00 a.m. and noted dizziness, blurry vision along with heaviness of her left side.  She denies any actual numbness or weakness.  She denies any facial asymmetry.  She denies any speech deficits.  Her blood pressure normally runs in the 150s systolic.  She takes propranolol, once a day in the morning.  She did not take it today.  She denies any chest pain or shortness breath.  Denies any fevers/chills.        Review of patient's allergies indicates:   Allergen Reactions    Trazodone Other (See Comments)     Headaches    Reglan [metoclopramide] Anxiety     Past Medical History:   Diagnosis Date    Anxiety     Migraine headache     Scoliosis      Past Surgical History:   Procedure Laterality Date    BACK SURGERY  02/20/2018    SPINE SURGERY       Family History   Problem Relation Name Age of Onset    Hypertension Mother Chika smalls     Stroke Mother Chika smalls     Aneurysm Mother Chika smalls     Cancer Maternal Grandmother Jessica smalls     Breast cancer Neg Hx      Colon cancer Neg Hx      Ovarian cancer Neg Hx       Social History[1]  Review of Systems   Constitutional:  Negative for chills and fever.   HENT:  Negative for congestion and rhinorrhea.    Eyes:  Positive for visual disturbance.   Respiratory:  Negative for chest tightness and shortness of breath.    Cardiovascular:  Negative for chest pain and palpitations.    Gastrointestinal:  Negative for abdominal pain, diarrhea, nausea and vomiting.   Genitourinary:  Negative for dysuria and flank pain.   Musculoskeletal:  Negative for back pain and neck pain.   Skin:  Negative for color change and wound.   Neurological:  Positive for dizziness and headaches.       Physical Exam     Initial Vitals [06/08/25 0924]   BP Pulse Resp Temp SpO2   (!) 155/126 103 16 97.6 °F (36.4 °C) 100 %      MAP       --         Physical Exam    Nursing note and vitals reviewed.  Constitutional: She appears well-developed and well-nourished.   HENT:   Head: Normocephalic and atraumatic.   Eyes: Conjunctivae are normal.   Neck: Neck supple.   Normal range of motion.  Cardiovascular:  Normal rate, regular rhythm and normal heart sounds.           Pulmonary/Chest: Breath sounds normal. No respiratory distress. She has no wheezes. She has no rales.   Abdominal: Abdomen is soft. Bowel sounds are normal. She exhibits no distension. There is no abdominal tenderness. There is no rebound.   Musculoskeletal:         General: Normal range of motion.      Cervical back: Normal range of motion and neck supple.     Neurological: She is alert and oriented to person, place, and time.   Skin: Skin is warm and dry. Capillary refill takes less than 2 seconds.         ED Course   Procedures  Labs Reviewed   URINALYSIS - Abnormal       Result Value    Color, UA Brown (*)     Appearance, UA Hazy (*)     pH, UA 6.0      Spec Grav UA >=1.030 (*)     Protein, UA 2+ (*)     Glucose, UA Negative      Ketones, UA Trace (*)     Bilirubin, UA Negative      Blood, UA 3+ (*)     Nitrites, UA Negative      Urobilinogen, UA Negative      Leukocyte Esterase, UA Trace (*)    COMPREHENSIVE METABOLIC PANEL - Abnormal    Sodium 139      Potassium 3.7      Chloride 107      CO2 18 (*)     Glucose 85      BUN 8      Creatinine 0.9      Calcium 9.4      Protein Total 8.3      Albumin 4.3      Bilirubin Total 1.3 (*)     ALP 60      AST 21       ALT 10      Anion Gap 14      eGFR >60     CBC WITH DIFFERENTIAL - Abnormal    WBC 5.00      RBC 4.93      HGB 15.2      HCT 44.4      MCV 90      MCH 30.8      MCHC 34.2      RDW 11.9      Platelet Count 192      MPV 11.7      Nucleated RBC 0      Neut % 40.0      Lymph % 50.2 (*)     Mono % 8.0      Eos % 1.2      Basophil % 0.6      Imm Grans % 0.0      Neut # 2.00      Lymph # 2.51      Mono # 0.40      Eos # 0.06      Baso # 0.03      Imm Grans # 0.00     URINALYSIS MICROSCOPIC - Abnormal    RBC, UA >100 (*)     WBC, UA 11 (*)     Bacteria, UA Many (*)     Squamous Epithelial Cells, UA 5      Hyaline Casts, UA 1      Microscopic Comment       HEPATITIS C ANTIBODY - Normal    Hep C Ab Interp Negative     HIV 1 / 2 ANTIBODY - Normal    HIV 1/2 Ag/Ab Negative     HEP C VIRUS HOLD SPECIMEN    Extra Tube 1     CBC W/ AUTO DIFFERENTIAL    Narrative:     The following orders were created for panel order CBC auto differential.  Procedure                               Abnormality         Status                     ---------                               -----------         ------                     CBC with Differential[[id number]]       Abnormal            Final result                 Please view results for these tests on the individual orders.   EXTRA TUBES    Narrative:     The following orders were created for panel order EXTRA TUBES.  Procedure                               Abnormality         Status                     ---------                               -----------         ------                     Light Green Top Hold[[id number]]                            Final result                 Please view results for these tests on the individual orders.   LIGHT GREEN TOP HOLD    Extra Tube Yes     EXTRA TUBES    Narrative:     The following orders were created for panel order EXTRA TUBES.  Procedure                               Abnormality         Status                     ---------                                -----------         ------                     Lavender Top Hold[[id number]]                                                            Please view results for these tests on the individual orders.   LAVENDER TOP HOLD   POCT URINE PREGNANCY    POC Preg Test, Ur Negative       Acceptable Yes            Imaging Results              CTA Head and Neck (xpd) (Final result)  Result time 06/08/25 11:04:23      Final result by Brennen Figueroa DO (06/08/25 11:04:23)                   Impression:      CTA head: Unremarkable motion distorted CTA of the head specifically without evidence for proximal significant stenosis or occlusion.    CTA neck: No significant arterial stenosis throughout the neck    CT head: Unremarkable motion distorted CT head specifically without evidence for acute intracranial hemorrhage or definite large territory recent infarction    Clinical correlation and further evaluation with MRI as warranted clinically if patient compatible..      Electronically signed by: Brennen Figueroa DO  Date:    06/08/2025  Time:    11:04               Narrative:    EXAMINATION:  CTA HEAD AND NECK (XPD)    CLINICAL HISTORY:  Dizziness, persistent/recurrent, cardiac or vascular cause suspected;    TECHNIQUE:  5 mm axial images of the head pre and post contrast with 1.25 mm axial CTA images of the head neck post-contrast.  Coronal and sagittal MPR and MIP imaging was performed 100 ml of Omnipaque 350 contrast was injected intravenously    COMPARISON:  None    FINDINGS:  CT head with and  without contrast: Study distorted by motion artifacts, allowing for artifacts there is no evidence for acute intracranial hemorrhage or sulcal effacement.  The ventricles are normal in size and configuration without evidence for hydrocephalus.  There is no midline shift or mass effect.  Allowing for CT technique there is no abnormal parenchymal enhancement.  The visualized paranasal sinuses and mastoid air cells are  clear.    CTA head:    Anterior circulation: The bilateral distal cervical, petrous, cavernous, and supraclinoid segments of the ICAs are patent without significant focal stenosis or aneurysm.    The anterior middle cerebral arteries are patent without focal stenosis or aneurysm.    Posterior circulation: The distal vertebral arteries, basilar artery and posterior cerebral arteries are patent without focal stenosis or aneurysm.    CTA neck: Study distorted by motion artifacts.  Allowing for motion artifacts the visualized arch and origin great vessels from the arch are within normal limits.    The origin of the vertebral arteries from the respective subclavian arteries are within normal limits.  The vertebral arteries are patent throughout their course without focal stenosis or occlusion.    Right carotid: The right common carotid artery, carotid bifurcation and extracranial portions of the internal carotid artery are patent without significant focal stenosis.    Left carotid: The left common carotid artery, carotid bifurcation and extracranial portions of the internal carotid arteries are patent without significant focal stenosis.    Overall less than 50% proximal ICA stenosis by NASCET criteria.    Pharynx/larynx: Allowing for artifact from motion no definite focal lesion throughout the pharynx/larynx.    Oral cavity and the buccal space     distorted by patient motion.    Right nasal ala cosmetic piercing identified.    Glands: No focal parotid, submandibular or thyroid gland lesion.    No evidence for adenopathy throughout the neck by size criteria.    Remote operative change with paired spinal rods and pedicular screws T4 and T5 vertebra included in the field of view.  No evidence for acute fracture or traumatic subluxation visualized spine.                                       Medications   ketorolac injection 30 mg (30 mg Intravenous Given 6/8/25 1012)   0.9% NaCl infusion (0 mLs Intravenous Stopped 6/8/25  1135)   propranoloL tablet 60 mg (60 mg Oral Given 6/8/25 1027)   iohexoL (OMNIPAQUE 350) injection 100 mL (100 mLs Intravenous Given 6/8/25 1035)     Medical Decision Making  9:37AM:  Patient is a 24-year-old female who presents to the emergency department with dizziness, headache, lightheadedness, elevated blood pressures.  Patient appears well, nontoxic.  Will plan to treat the headache along with giving her daily blood pressure medication along with neuroimaging.  Will continue to follow and reassess.    Amount and/or Complexity of Data Reviewed  External Data Reviewed: notes.  Labs: ordered. Decision-making details documented in ED Course.  Radiology: ordered. Decision-making details documented in ED Course.    Risk  Prescription drug management.    11:20 AM:  Patient doing well, she is feeling better.  Her blood pressure has improved and her CTA is negative for any acute findings.  Her labs are otherwise unremarkable.  Her UA shows a lot of blood but she is on her menstrual cycle.  Given her reassuring workup, I do not feel that further work up in the ED is indicated at this time.  I updated pt regarding results and I counseled pt regarding supportive care measures.  I have discussed with the pt ED return warnings and need for close PCP f/u.  Pt agreeable to plan and all questions answered.  I feel that pt is stable for discharge and management as an outpatient and no further intervention is needed at this time.  Pt is comfortable returning to the ED if needed.  Will DC home in stable condition.                                    Clinical Impression:  Final diagnoses:  [R51.9] Acute nonintractable headache, unspecified headache type (Primary)          ED Disposition Condition    Discharge Stable          ED Prescriptions    None       Follow-up Information       Follow up With Specialties Details Why Contact Info    Primary Care Physician              Launch MDCalc MDM  MDCalc MDM Module  Jun 08 2025 3:01 PM  [Yany Lockhart]  Data:  - Test/documents/historian: 3+ tests ordered  Problems: Concern for Hypertensive Emergency  Risk: NaCl infusion + 2 more (Rx drug management), CTA Head+Neck Ves WO+W contr IV (Iodinated IV contrast in low-risk patient)             [1]   Social History  Tobacco Use    Smoking status: Never     Passive exposure: Never    Smokeless tobacco: Never   Substance Use Topics    Alcohol use: Never    Drug use: Never        Yany Lockhart MD  06/08/25 9421

## 2025-06-08 NOTE — Clinical Note
"Danette Sepulveda" Page was seen and treated in our emergency department on 6/8/2025.  She may return to work on 06/12/2025.       If you have any questions or concerns, please don't hesitate to call.      Meme DALEY    "

## 2025-06-09 ENCOUNTER — PATIENT MESSAGE (OUTPATIENT)
Dept: OBSTETRICS AND GYNECOLOGY | Facility: CLINIC | Age: 25
End: 2025-06-09
Payer: COMMERCIAL

## 2025-06-18 ENCOUNTER — PATIENT MESSAGE (OUTPATIENT)
Dept: DERMATOLOGY | Facility: CLINIC | Age: 25
End: 2025-06-18
Payer: COMMERCIAL

## 2025-06-23 ENCOUNTER — OFFICE VISIT (OUTPATIENT)
Dept: OBSTETRICS AND GYNECOLOGY | Facility: CLINIC | Age: 25
End: 2025-06-23
Attending: STUDENT IN AN ORGANIZED HEALTH CARE EDUCATION/TRAINING PROGRAM
Payer: COMMERCIAL

## 2025-06-23 ENCOUNTER — LAB VISIT (OUTPATIENT)
Dept: LAB | Facility: OTHER | Age: 25
End: 2025-06-23
Attending: STUDENT IN AN ORGANIZED HEALTH CARE EDUCATION/TRAINING PROGRAM
Payer: COMMERCIAL

## 2025-06-23 VITALS
SYSTOLIC BLOOD PRESSURE: 126 MMHG | DIASTOLIC BLOOD PRESSURE: 80 MMHG | BODY MASS INDEX: 18.82 KG/M2 | HEIGHT: 60 IN | WEIGHT: 95.88 LBS

## 2025-06-23 DIAGNOSIS — N92.6 IRREGULAR PERIODS/MENSTRUAL CYCLES: ICD-10-CM

## 2025-06-23 DIAGNOSIS — N92.6 IRREGULAR PERIODS/MENSTRUAL CYCLES: Primary | ICD-10-CM

## 2025-06-23 LAB — TESTOST SERPL-MCNC: 55 NG/DL (ref 5–73)

## 2025-06-23 PROCEDURE — 99999 PR PBB SHADOW E&M-EST. PATIENT-LVL III: CPT | Mod: PBBFAC,,, | Performed by: STUDENT IN AN ORGANIZED HEALTH CARE EDUCATION/TRAINING PROGRAM

## 2025-06-23 PROCEDURE — 84403 ASSAY OF TOTAL TESTOSTERONE: CPT

## 2025-06-23 PROCEDURE — 99214 OFFICE O/P EST MOD 30 MIN: CPT | Mod: S$GLB,,, | Performed by: STUDENT IN AN ORGANIZED HEALTH CARE EDUCATION/TRAINING PROGRAM

## 2025-06-23 PROCEDURE — 83498 ASY HYDROXYPROGESTERONE 17-D: CPT

## 2025-06-23 PROCEDURE — 81515 NFCT DS BV&VAGINITIS DNA ALG: CPT | Performed by: STUDENT IN AN ORGANIZED HEALTH CARE EDUCATION/TRAINING PROGRAM

## 2025-06-23 PROCEDURE — 36415 COLL VENOUS BLD VENIPUNCTURE: CPT

## 2025-06-23 PROCEDURE — 84402 ASSAY OF FREE TESTOSTERONE: CPT

## 2025-06-23 NOTE — PROGRESS NOTES
Chief Complaint: Vaginal discharge, spotting     HPI:      Danette Ang is a 24 y.o.  who presents today for evaluation of spotting.  Has been on progesterone only ocp for several years.  Prior to this year had a cycle monthly on pill.  This year had cycle in  and then again in .  2 weeks later is now having spotting.   Not pregnant.  LMP: Patient's last menstrual period was 2025.  No other issues, problems, or complaints.   OB History          0    Para   0    Term   0       0    AB   0    Living   0         SAB   0    IAB   0    Ectopic   0    Multiple   0    Live Births   0               Past Medical History:   Diagnosis Date    Anxiety     Migraine headache     Scoliosis      Past Surgical History:   Procedure Laterality Date    BACK SURGERY  2018    SPINE SURGERY       Social History[1]  Family History   Problem Relation Name Age of Onset    Hypertension Mother Chika ang     Stroke Mother Chika ang     Aneurysm Mother Chika ang     Cancer Maternal Grandmother Jessica ang     Breast cancer Neg Hx      Colon cancer Neg Hx      Ovarian cancer Neg Hx       Current Medications[2]    ROS:     GENERAL: Denies unintentional weight gain or weight loss. Feeling well overall.   SKIN: Denies rash or lesions.   HEENT: Denies headaches, or vision changes.   CARDIOVASCULAR: Denies palpitations or chest pain.   RESPIRATORY: Denies shortness of breath or dyspnea on exertion.  BREASTS: Denies pain, lumps, or nipple discharge.   ABDOMEN: Denies abdominal pain, constipation, diarrhea, nausea, vomiting, change in appetite.  URINARY: Denies frequency, dysuria, hematuria.  NEUROLOGIC: Denies syncope or weakness.   PSYCHIATRIC: Denies depression, anxiety or mood swings.    Physical Exam:      PHYSICAL EXAM:  /80   Ht 5' (1.524 m)   Wt 43.5 kg (95 lb 13.7 oz)   LMP 2025   BMI 18.72 kg/m²   Body mass index is 18.72 kg/m².     APPEARANCE: Well nourished, well developed,  in no acute distress.  PSYCH: Appropriate mood and affect.  SKIN: No acne or hirsutism.  ABDOMEN: Soft.  No tenderness or masses.    PELVIC: Normal external genitalia without lesions.  Normal hair distribution.  Adequate perineal body, normal urethral meatus.  Vagina moist and well rugated without lesions or discharge.  Cervix pink, without lesions, discharge or tenderness.  No significant cystocele or rectocele.  Bimanual exam shows uterus to be normal size, regular, mobile and nontender.  Adnexa without masses or tenderness.    EXTREMITIES: No edema.  No tenderness to palpation.  Female chaperone was present for exam.       Assessment/Plan:     24 y.o.     Irregular periods/menstrual cycles  -     Vaginosis Screen by DNA Probe; Future; Expected date: 2025    Other orders  -     drospirenone, contraceptive, 4 mg (28) Tab; Take 1 tablet (4 mg total) by mouth once daily.  Dispense: 90 tablet; Refill: 3          Counseling:     Reviewed testing.  Affirm sent and will follow up results.  Declines sti screen.  Reviewed labs findings - some results have not been collected yet.  Discussed changing ocp to slynd to see if this helps.  R/b/a reviewed and all questions answered.  She is in agreement.  Follow up in 3 months or sooner if needed.        [1]   Social History  Socioeconomic History    Marital status: Single   Tobacco Use    Smoking status: Never     Passive exposure: Never    Smokeless tobacco: Never   Substance and Sexual Activity    Alcohol use: Never    Drug use: Never    Sexual activity: Yes     Partners: Male     Birth control/protection: OCP     Social Drivers of Health     Financial Resource Strain: Low Risk  (2023)    Received from [facility]    Overall Financial Resource Strain (CARDIA)     Difficulty of Paying Living Expenses: Not hard at all   Food Insecurity: No Food Insecurity (2023)    Received from [facility]    Hunger Vital Sign     Worried About Running Out of Food in the  Last Year: Never true     Ran Out of Food in the Last Year: Never true   Transportation Needs: No Transportation Needs (6/11/2023)    Received from [facility]    PRAPARE - Transportation     Lack of Transportation (Medical): No     Lack of Transportation (Non-Medical): No   Physical Activity: Unknown (4/1/2025)    Exercise Vital Sign     Days of Exercise per Week: 3 days   Stress: Stress Concern Present (9/20/2023)    Received from [facility]    [employer] [place] [employer] - Occupational Stress Questionnaire     Feeling of Stress : To some extent   Housing Stability: Unknown (6/11/2023)    Received from [facility]    Housing Stability Vital Sign     Unable to Pay for Housing in the Last Year: No     Unstable Housing in the Last Year: No   [2]   Current Outpatient Medications:     doxepin (SINEQUAN) 10 MG capsule, Take 10 mg by mouth every evening., Disp: , Rfl:     norethindrone (MICRONOR) 0.35 mg tablet, Take 1 tablet (0.35 mg total) by mouth once daily., Disp: 84 tablet, Rfl: 3    ondansetron (ZOFRAN-ODT) 4 MG TbDL, Take 1 tablet (4 mg total) by mouth every 8 (eight) hours as needed (nausea)., Disp: 30 tablet, Rfl: 0    propranoloL (INDERAL LA) 60 MG 24 hr capsule, Take 1 capsule by mouth once daily., Disp: , Rfl:     ubrogepant (UBRELVY) 100 mg tablet, Take 1 tablet (100 mg total) by mouth every 2 (two) hours as needed for Migraine. If symptoms persist or return, may repeat dose after 2 hours. Maximum: 200 mg per 24 hours, Disp: 16 tablet, Rfl: 2    dicyclomine (BENTYL) 20 mg tablet, Take 20 mg by mouth every 6 (six) hours. (Patient not taking: Reported on 6/23/2025), Disp: , Rfl:     doxycycline (VIBRAMYCIN) 100 MG Cap, Take 100 mg by mouth. (Patient not taking: Reported on 6/23/2025), Disp: , Rfl:     drospirenone, contraceptive, 4 mg (28) Tab, Take 1 tablet (4 mg total) by mouth once daily., Disp: 90 tablet, Rfl: 3    fluconazole (DIFLUCAN) 150 MG Tab, Take 150 mg by mouth Every 3 (three) days.  (Patient not taking: Reported on 6/23/2025), Disp: , Rfl:     ibuprofen (ADVIL,MOTRIN) 800 MG tablet, TAKE 1 TABLET(800 MG) BY MOUTH EVERY 8 HOURS AS NEEDED FOR PAIN (Patient not taking: Reported on 6/23/2025), Disp: 20 tablet, Rfl: 2

## 2025-06-25 LAB
BACTERIAL VAGINOSIS DNA (OHS): NOT DETECTED
CANDIDA GLABRATA/KRUSEI DNA (OHS): NOT DETECTED
CANDIDA SPECIES DNA (OHS): NOT DETECTED
TRICHOMONAS VAGINALIS DNA (OHS): NOT DETECTED

## 2025-06-26 LAB
W 17-ALPHA HYDROXYPROGESTERONE: 153 NG/DL
W FREE TESTOSTERONE: 0.9 PG/ML

## 2025-06-27 ENCOUNTER — RESULTS FOLLOW-UP (OUTPATIENT)
Dept: OBSTETRICS AND GYNECOLOGY | Facility: CLINIC | Age: 25
End: 2025-06-27

## 2025-07-08 ENCOUNTER — PATIENT MESSAGE (OUTPATIENT)
Dept: OBSTETRICS AND GYNECOLOGY | Facility: CLINIC | Age: 25
End: 2025-07-08
Payer: COMMERCIAL

## 2025-07-09 ENCOUNTER — E-VISIT (OUTPATIENT)
Dept: FAMILY MEDICINE | Facility: CLINIC | Age: 25
End: 2025-07-09
Payer: COMMERCIAL

## 2025-07-09 DIAGNOSIS — N76.0 ACUTE VAGINITIS: Primary | ICD-10-CM

## 2025-07-09 RX ORDER — FLUCONAZOLE 150 MG/1
150 TABLET ORAL
Qty: 2 TABLET | Refills: 0 | Status: SHIPPED | OUTPATIENT
Start: 2025-07-09 | End: 2025-07-13

## 2025-07-09 NOTE — PROGRESS NOTES
Patient ID: Danette Ang is a 24 y.o. female.        E-Visit Time Tracking:   Day 1 Time (in minutes): 12  Total Time (in minutes): 12      Chief Complaint: General Illness (Entered automatically based on patient selection in [employer].)      The patient initiated a request through [employer] on 7/9/2025 for evaluation and management with a chief complaint of General Illness (Entered automatically based on patient selection in [employer].)     I evaluated the questionnaire submission on 07/09/2025.    [facility]    7/9/2025  8:10 AM CDT - Filed by Patient   What do you need help with? Other Concern   Do you agree to participate in an E-Visit? Yes   If you have any of the following symptoms, please go to the nearest emergency room or call 911: I acknowledge   Do you have any of the following pregnancy-related conditions? (Pregnant, Possibly pregnant, Breast feeding, None) None   What is the main issue you would like addressed today? Yeast infection   Please describe your symptoms. White chunky discharge & itching irritation   Where is your problem located? Vaginal   On a scale of 1-10, where 10 is the worst you can imagine, how severe are your symptoms? (range: 1 - 10) 1   Have you had these symptoms before? Yes   How long have you been having these symptoms? (Just today, For a few days, For a week, For one to four weeks, For more than a month) A few days   What helps with your symptoms? Nothing   What makes your symptoms feel worse? No   Are these symptoms related to a condition that you currently have? (Yes, No, Not sure) Yes   Provide any information you feel is important to your history not asked above N/A   Please attach any relevant images or files    Are you able to take your vitals? No         Encounter Diagnosis   Name Primary?    Acute vaginitis Yes        No orders of the defined types were placed in this encounter.     Medications Ordered This Encounter   Medications    fluconazole  (DIFLUCAN) 150 MG Tab     Sig: Take 1 tablet (150 mg total) by mouth every 72 hours. for 2 doses     Dispense:  2 tablet     Refill:  0        No follow-ups on file.

## 2025-07-31 ENCOUNTER — OFFICE VISIT (OUTPATIENT)
Dept: OBSTETRICS AND GYNECOLOGY | Facility: CLINIC | Age: 25
End: 2025-07-31
Payer: COMMERCIAL

## 2025-07-31 VITALS — WEIGHT: 98.13 LBS | BODY MASS INDEX: 19.27 KG/M2 | HEIGHT: 60 IN

## 2025-07-31 DIAGNOSIS — N89.8 VAGINAL DISCHARGE: Primary | ICD-10-CM

## 2025-07-31 DIAGNOSIS — Z11.3 SCREENING FOR STD (SEXUALLY TRANSMITTED DISEASE): ICD-10-CM

## 2025-07-31 PROCEDURE — 99999 PR PBB SHADOW E&M-EST. PATIENT-LVL III: CPT | Mod: PBBFAC,,, | Performed by: OBSTETRICS & GYNECOLOGY

## 2025-07-31 PROCEDURE — 99214 OFFICE O/P EST MOD 30 MIN: CPT | Mod: S$GLB,,, | Performed by: OBSTETRICS & GYNECOLOGY

## 2025-07-31 RX ORDER — AMLODIPINE BESYLATE 5 MG/1
5 TABLET ORAL
COMMUNITY
Start: 2025-07-08

## 2025-07-31 NOTE — PROGRESS NOTES
"Subjective:       Patient ID: Danette Ang is a 25 y.o. female.    Chief Complaint:  Vaginal Discharge      History of Present Illness  - patient presents with one week history of vaginal discharge. Period ended about a week ago. Has h/o BV but thinks this is different. Has remote history of chlamydia.    Past Medical History:   Diagnosis Date    Amenorrhea     Anxiety     Dysmenorrhea     Hypertension     Migraine headache     Scoliosis        Past Surgical History:   Procedure Laterality Date    BACK SURGERY  02/20/2018    SPINE SURGERY          Family History   Problem Relation Name Age of Onset    Hypertension Mother Chika ang     Stroke Mother Chika ang     Aneurysm Mother Chika ang     Cancer Maternal Grandmother Jessica ang     Breast cancer Neg Hx      Colon cancer Neg Hx      Ovarian cancer Neg Hx          Social History[1]        Objective:     Vitals:    07/31/25 1025   Weight: 44.5 kg (98 lb 1.7 oz)   Height: 5' (1.524 m)       Physical Exam:   Constitutional: She appears well-developed and well-nourished. She is cooperative. No distress.             Abdominal: Soft. There is no abdominal tenderness.     Genitourinary:    Uterus normal.   There is no rash, tenderness or lesion on the right labia. There is no rash, tenderness or lesion on the left labia. Cervix is normal. Right adnexum displays no mass, no tenderness and no fullness. Left adnexum displays no mass, no tenderness and no fullness. There is vaginal discharge in the vagina.    Genitourinary Comments: Yellow-brown d/c in vault                 Neurological: She is alert.          A female chaperone was present for exam.    Assessment/ Plan:     Orders Placed This Encounter    C. trachomatis/N. gonorrhoeae by AMP DNA [facility]; Cervicovaginal    Vaginosis Screen by DNA Probe       Danette Sepulveda" was seen today for vaginal discharge.    Diagnoses and all orders for this visit:    Vaginal discharge  -     Vaginosis Screen by DNA " Probe    Screening for STD (sexually transmitted disease)  -     C. trachomatis/N. gonorrhoeae by [facility]; Cervicovaginal    - f/u swabs.    Follow up for annual exam with Dr. Blum.    As of April 1, 2021, the Cures Act has been passed nationally. This new law requires that all doctors progress notes, lab results, pathology reports and radiology reports be released IMMEDIATELY to the patient in the patient portal. That means that the results are released to you at the EXACT same time they are released to me. Therefore, with all of the patients that I have I am not able to reply to each patient exactly when the results come in. So there will be a delay from when you see the results to when I see them and have time to come up with a response to send you. Also I only see these results when I am on the computer at work. So if the results come in over the weekend or after 5 pm of a work day, I will not see them until the next business day. As you can tell, this is a challenge as a physician to give every patient the quick response they hope for and deserve. So please be patient!   Thanks for your understanding and patience.         [1]   Social History  Socioeconomic History    Marital status: Single   Tobacco Use    Smoking status: Never     Passive exposure: Never    Smokeless tobacco: Never   Substance and Sexual Activity    Alcohol use: Never    Drug use: Never    Sexual activity: Yes     Partners: Male     Birth control/protection: Condom, None     Social Drivers of Health     Financial Resource Strain: Low Risk  (6/11/2023)    Received from [facility]    Overall Financial Resource Strain (CARDIA)     Difficulty of Paying Living Expenses: Not hard at all   Food Insecurity: No Food Insecurity (6/11/2023)    Received from [facility]    Hunger Vital Sign     Worried About Running Out of Food in the Last Year: Never true     Ran Out of Food in the Last Year: Never true   Transportation Needs: No  Transportation Needs (6/11/2023)    Received from [facility]    PRAPARE - Transportation     Lack of Transportation (Medical): No     Lack of Transportation (Non-Medical): No   Physical Activity: Unknown (4/1/2025)    Exercise Vital Sign     Days of Exercise per Week: 3 days   Stress: Stress Concern Present (9/20/2023)    Received from [facility]    [employer] - Occupational Stress Questionnaire     Feeling of Stress : To some extent   Housing Stability: Unknown (6/11/2023)    Received from [facility]    Housing Stability Vital Sign     Unable to Pay for Housing in the Last Year: No     Unstable Housing in the Last Year: No

## 2025-08-19 ENCOUNTER — E-VISIT (OUTPATIENT)
Dept: URGENT CARE | Facility: CLINIC | Age: 25
End: 2025-08-19
Payer: COMMERCIAL

## 2025-08-19 ENCOUNTER — PATIENT MESSAGE (OUTPATIENT)
Dept: OBSTETRICS AND GYNECOLOGY | Facility: CLINIC | Age: 25
End: 2025-08-19
Payer: COMMERCIAL

## 2025-08-19 DIAGNOSIS — N76.0 ACUTE VAGINITIS: Primary | ICD-10-CM

## 2025-08-19 PROCEDURE — 99499 UNLISTED E&M SERVICE: CPT | Mod: 95,,, | Performed by: NURSE PRACTITIONER

## 2025-08-19 RX ORDER — FLUCONAZOLE 150 MG/1
150 TABLET ORAL DAILY
Qty: 2 TABLET | Refills: 0 | Status: SHIPPED | OUTPATIENT
Start: 2025-08-19 | End: 2025-08-21